# Patient Record
Sex: FEMALE | Race: WHITE | NOT HISPANIC OR LATINO | Employment: OTHER | URBAN - METROPOLITAN AREA
[De-identification: names, ages, dates, MRNs, and addresses within clinical notes are randomized per-mention and may not be internally consistent; named-entity substitution may affect disease eponyms.]

---

## 2023-06-12 ENCOUNTER — HOSPITAL ENCOUNTER (OUTPATIENT)
Dept: DATA CONVERSION | Facility: HOSPITAL | Age: 23
End: 2023-06-12
Attending: PHYSICAL MEDICINE & REHABILITATION | Admitting: PHYSICAL MEDICINE & REHABILITATION
Payer: MEDICARE

## 2023-06-12 DIAGNOSIS — M47.26 OTHER SPONDYLOSIS WITH RADICULOPATHY, LUMBAR REGION: ICD-10-CM

## 2023-06-12 DIAGNOSIS — M43.17 SPONDYLOLISTHESIS, LUMBOSACRAL REGION: ICD-10-CM

## 2023-06-12 DIAGNOSIS — M47.816 SPONDYLOSIS WITHOUT MYELOPATHY OR RADICULOPATHY, LUMBAR REGION: ICD-10-CM

## 2023-06-12 DIAGNOSIS — M54.16 RADICULOPATHY, LUMBAR REGION: ICD-10-CM

## 2023-07-10 ENCOUNTER — HOSPITAL ENCOUNTER (OUTPATIENT)
Dept: DATA CONVERSION | Facility: HOSPITAL | Age: 23
End: 2023-07-10
Attending: PHYSICAL MEDICINE & REHABILITATION | Admitting: PHYSICAL MEDICINE & REHABILITATION
Payer: MEDICARE

## 2023-07-10 DIAGNOSIS — M47.816 SPONDYLOSIS WITHOUT MYELOPATHY OR RADICULOPATHY, LUMBAR REGION: ICD-10-CM

## 2023-07-10 DIAGNOSIS — M54.14 RADICULOPATHY, THORACIC REGION: ICD-10-CM

## 2023-07-10 DIAGNOSIS — M54.50 LOW BACK PAIN, UNSPECIFIED: ICD-10-CM

## 2023-07-10 DIAGNOSIS — M51.16 INTERVERTEBRAL DISC DISORDERS WITH RADICULOPATHY, LUMBAR REGION: ICD-10-CM

## 2023-09-30 NOTE — H&P
History & Physical Reviewed:   Pregnant/Lactating:  ·  Are You Pregnant no   ·  Are You Currently Breastfeeding no     I have reviewed the History and Physical dated:  19-May-2023   History and Physical reviewed and relevant findings noted. Patient examined to review pertinent physical  findings.: Significant findings noted below   Findings: Patient reports no change in symptoms since   H&P/previous evaluation.  No f/c/s, no change in medical problems or comorbidities.  Heart and lung evaluation is normal  and neurological examination is unchanged from previous as follows:.    EXCEPT hospitalized at [facility] with inc LBP - MRI showed facet arthropathy and severe b/l L5 stenosis.  Hospital team rec'd facet procedures so that's what was scheduled today    Diagnoses/Problems  Assessed    · Thoracic radiculitis (724.4) (M54.14)   · Old tear of medial meniscus of right knee, unspecified tear type (717.3) (M23.203)   · Acquired spondylolisthesis of lumbosacral region (738.4) (M43.17)   · Lumbar radiculitis (724.4) (M54.16)   · Scheuermann's kyphosis of thoracic spine (732.0) (M42.04)   · Lumbar disc herniation with radiculopathy (722.10,724.4) (M51.16)   · Lumbar spondylosis (721.3) (M47.816)    Orders  Acquired spondylolisthesis of lumbosacral region, Lumbar radiculitis, Old tear of medial  meniscus of right knee, unspecified tear type, Thoracic radiculitis    · Start: Diclofenac Sodium 1.5 % External Solution; APPLY 20 DROPS TO AFFECTED  JOINT(S) FOUR TIMES A DAY. MAY USE ON UP TO 2 JOINTS PER DAY  AS DIRECTED  Lateral pain of right hip    · Xray Hip, unilateral w/ pelvis when performed, 2 or 3  view; Status:Active; Requested  for:19May2023;   Laterality : Right  Radiologist to Determine Optimal Study : Y  What are the patient's signs and symptoms? : midthoracic pain, increased kyphosis?   h/o Fredrich's Ataxia wheelchair dependent  Scheuermann's kyphosis of thoracic spine, Thoracic radiculitis    · Xray Thoracic Spine  Min 4 View; Status:Active; Requested for:[id number];   Radiologist to Determine Optimal Study : Y  What are the patient's signs and symptoms? : midthoracic pain, increased kyphosis?   h/o Fredrich's Ataxia wheelchair dependent    Chief Complaint    FUV- Evaluate & treat upper & lower back pain-      History of Present Illness      Follow up note     CC: Patient complains of low back pain    Here with her mother. Had BL L5 TFESI on 8/24/2022 -: had 60% relief for 4-5 months but repeat bilateral L5 transforaminal March 13 seem to help last, especially the right side where she has pain radiating to the buttock. Also seen separately for new  problem more of a bump in her thoracic spine, gets sensitive superficially. Progressive thoracic pain, started around the same time in March, she did have a fall during transfer about 2 weeks before the epidural injection, with right knee meniscus tear,  but also thinks she may have injured the thoracic spine at the time, no immediate pain then. Now feels bilateral achy pain occasionally sharp between the shoulder blades with a knot on the left side. Has been trying physical therapy, including some myofascial  release in this region that is only minimally helpful. She has radiation from the thoracic went up to the base of the neck, but not into the extremities, no change in bowel or bladder function  Also separately reports third problem right groin pain, worse with activity transfers better with nothing.  Overall Rates pain 9/10. Still taking percocet 7.5mg QID as per PCP? pain management. Started lamotrigine for neuropathic pain but now is back to just taking gabapentin and baclofen, topical diclofenac. Has tried nitroglycerin for pain.     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 She also has history of obesity's status post bariatric surgery in 2019 with 180 pounds of weight  loss. She  complains of many years of low back pain attributed to her weight and wheelchair use, has a history of Shermans kyphosis and intermittent thoracic pain    Patient reports no fevers, chills, sweats, night pain, cancer history no bowel or bladder disturbance .  She does endorse mood problems, sleep disturbance muscle cramping and stomach problems.      Pertinent Physical Exam (see remainder below):  MSK: Sitting posture is slouching, her same wheelchair is too large for her and has a very flat back, and leg bolsters are too far from her lateral thighs. She also has mild kyphosis, with a prominent thoracic spinous process around to the 10, focally  tender and mild skin induration/discoloration in this region. Some difficulty with trunk control, and stands with assist from her mom that increases her pain. POS tenderness bilateral paraspinals and midline both in the lower lumbar spine, and the lower  cervical/upper thoracic, but minimal midline tenderness. Range of motion is not fully testable because of her weakness, but cervical moves well,   Neuro: Trace weakness in bilateral finger extensors, 4/5 and hand intrinsics, otherwise normal strength in upper extremities, Quads 4/5 on Right and 3/5 on left clearly weaker on Left , ADF 2/5, PF is still 4+/5 with manual testing except toe flexors  4/5. Straight leg raise negative sitting     IMPRESSION:  Friedreich's ataxia with progressive weakness, truncal instability and wheelchair dependence  Scheuermann's kyphosis   Lumbar spondylosis with documented grade 1 L5 anterolisthesis, and lumbar disc protrusion at L4 and L5  Axial pain at cervicothoracic junction is probably mechanical  Prior injections with Dr. Johnson were not in HIE but diagnosed under spondylosis so probably MBB  Annular tear noted at L4-L5 disc on Lumbar MRI from June 8, 2021    RECOMMENDATIONS:  -Reviewed thoracic and right hip x-rays today shows wedging of T10 and T11 vertebrae, with prominent  spinous process/gap between adjacent, hip x-rays look normal but do show lumbar facet arthropathy L5-S1  -Return for wheelchair seating clinic at [facility], suggested building and some relief for her thoracic spinous process, meanwhile use off-the-shelf cut padding that hopefully can be taped and placed in a donut configuration  -We will get MRI thoracic, I will look at lumbar structures as able   -to schedule repeat BL L5 TFESI with 4mg IV versed and depo 40 mg each side   -Continue taking oxycodone 7.5mg QID. Continue diclofenac gel prn. But I sent a new prescription for diclofenac drops can be applied to any region, may be helpful for the mid thoracic, continue lidocaine patch  - follow up next available after MRI - then consider Thoracic SYLVIA or ?Lumber MBB instead of SYLVIA?     Ready to learn, no apparent learning barriers. Education provided on treatment plan according to patient's preferred learning style. Patient verbalizes understanding.  Manas Russell M.D, FAAPMR, R-MSK  Chief, Division of PM&R  Board Certified in PM&R, Sports Medicine and Musculoskeletal Ultrasound    ____________________________________________________________________    SUPPORTING DOCUMENTATION (remaining history, exam, other findings):    Work-up reviewed - this has included MRi Lsp at [facility] 2020- and MRI Lsp at   June 8th 2021    L3 -- 4: Diffuse disc bulge. Patent central canal. Patent bilateral       neural foramina.      L4 -- 5: Diffuse disc bulge. Facet osteoarthritis. Patent central       canal. Patent bilateral neural foramina. posterior annular tear     L5 -- S1: Diffuse disc bulge, significant facet osteoarthritis, grade 1       anterior listhesis of L5 over S1 vertebral body. Patent central canal.       Severe bilateral foramina narrowing.           MRI Tspine - chronic wedging T10/11 and multilevel DDD c/w scheuermans    Treatment has included spine injections Dr Martinez and me  10/25/21 helped GREATLY -  almost no pain x 1 mo and lasted about 1.5 months then gradually returned.  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) (G11.11)   · Lower back pain (724.2) (M54.50)   · Lumbar radiculitis (724.4) (M54.16)   · Lumbar spondylosis (721.3) (M47.816)    Allergies  Medication    · No Known Drug Allergies  Recorded By: Manas Russell; 5/19/2023 8:30:48 AM    Current Meds    Medication Name Instruction  Baclofen 20 MG Oral Tablet   cloNIDine HCl - 0.1 MG Oral Tablet   Diclofenac Sodium 1 % External Gel APPLY 4 GRAMS TO THE AFFECTED AREA(S) FOUR TIMES DAILY AS NEEDED  Famotidine 20 MG Oral Tablet   Gabapentin 100 MG Oral Capsule   hydrOXYzine HCl - 10 MG Oral Tablet   Hyoscyamine Sulfate 0.125 MG Sublingual Tablet Sublingual DISSOLVE 1 (ONE) TABLET UNDER THE TONGUE EVERY 8 HOURS AS NEEDED FOR 7 DAYS  Omeprazole 40 MG Oral Capsule Delayed Release TAKE 1 CAPSULE BY MOUTH TWICE DAILY at 6AM and 9PM  Ondansetron 4 MG Oral Tablet Disintegrating Take 1 tablet by mouth every 6 hours as needed for nausea/vomiting for up to 7 days.  oxyCODONE-Acetaminophen 7.5-325 MG Oral Tablet   Pantoprazole Sodium 40 MG Oral Tablet Delayed Release TAKE 1 TABLET BY MOUTH ONCE DAILY  Sucralfate 1 GM/10ML Oral Suspension   Triamcinolone Acetonide 0.1 % External Cream     Signatures  Electronically signed by : Manas Russell MD; May 19 2023  9:28AM EST (Author)   Home Medications Reviewed: no changes noted   Allergies Reviewed: no changes noted       ERAS (Enhanced Recovery After Surgery):  ·  ERAS Patient: no     Consent:   COVID-19 Consent:  ·  COVID-19 Risk Consent Surgeon has reviewed key risks related to the risk of adwoa COVID-19 and if they contract COVID-19 what the risks are.       Electronic Signatures:  Manas Russell)  (Signed  12-Jun-2023 07:34)   Authored: History & Physical Reviewed, ERAS, Consent,  Note Completion      Last Updated: 12-Jun-2023 07:34 by Manas Russell)

## 2023-09-30 NOTE — H&P
History & Physical Reviewed:   Pregnant/Lactating:  ·  Are You Pregnant no   ·  Are You Currently Breastfeeding no     I have reviewed the History and Physical dated:  30-Jun-2023   History and Physical reviewed and relevant findings noted. Patient examined to review pertinent physical  findings.: Significant findings noted below   Findings: Patient reports no change in symptoms since   H&P/previous evaluation.  No f/c/s, no change in medical problems or comorbidities.  Heart and lung evaluation is normal  and neurological examination is unchanged from previous as follows:.    Diagnoses/Problems  Assessed    · Lumbar disc herniation with radiculopathy (722.10,724.4) (M51.16)   · Lumbar spondylosis (721.3) (M47.816)   · Thoracic radiculitis (724.4) (M54.14)    Chief Complaint    FUV- Evaluate & treat upper & lower back pain-      History of Present Illness        Follow up note     CC: Patient complains of low back pain    Here with her mother. new injuries in falls hurt whole spine, [facility] rec'd MBBs instead of ESIs I had been doing. I did MBB for L5-S1 facets 6/12/23 with good partial relief 8/10 down to 2/10 that day but had a lot of versed then... Now pain back in low  back but radiates to mid thigh posteriorly. SOmetimes all the way to her feet.     Still has mid back pain also with radiation from the thoracic went up to the base of the neck, but not into the extremities, no change in bowel or bladder function  Also separately reports third problem right groin pain, worse with activity transfers better with nothing.    Overall Rates pain 9/10. Still taking percocet 7.5mg QID , gabapentin lamotrigine, baclofen, as per PCP/pain management. topical diclofenac. Has tried nitroglycerin for pain.     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 She also has history of obesity's status post bariatric surgery in 2019  with 180 pounds of weight loss. She  complains of many years of low back pain attributed to her weight and wheelchair use, has a history of Shermans kyphosis and intermittent thoracic pain    Patient reports no fevers, chills, sweats, night pain, cancer history no bowel or bladder disturbance .  She does endorse mood problems, sleep disturbance muscle cramping and stomach problems.      Pertinent Physical Exam (see remainder below):  MSK: Sitting posture is improved with new cushion. mild kyphosis, with a prominent thoracic spinous process around to the 10, focally tender and mild skin induration/discoloration in this region. Some difficulty with trunk control, and Range of motion  is not fully testable because of her weakness, but cervical moves well,   Neuro: Trace weakness in bilateral finger extensors, 4/5 and hand intrinsics, otherwise normal strength in upper extremities, Quads 4/5 on Right and 3/5 on left clearly weaker on Left , ADF 2/5, PF is still 4+/5 with manual testing except toe flexors  4/5. Straight leg raise negative sitting     IMPRESSION:  Friedreich's ataxia with progressive weakness, truncal instability and wheelchair dependence  Scheuermann's kyphosis   Lumbar spondylosis with documented grade 1 L5 anterolisthesis, and lumbar disc protrusion at L4 and L5  Axial pain at cervicothoracic junction is probably mechanical  Prior injections with Dr. Johnson were not in HIE but diagnosed under spondylosis so probably MBB  Annular tear noted at L4-L5 disc on Lumbar MRI from June 8, 2021    RECOMMENDATIONS:  - schedule repeat BL L5 TFESI with 4mg IV versed - if not lasting then we could try MBB #2, (but now she has quite a bit of radicular pain, and epidurals have helped in past.   -Continue taking oxycodone 7.5mg QID. Continue diclofenac drops, baclofen and gabapentin   - follow up 2mo at [facility] facility    Ready to learn, no apparent learning barriers. Education provided on treatment plan according  to patient's preferred learning style. Patient verbalizes understanding.  Manas Russell M.D, [facility], R-MSK  Chief, Division of PM&R  Board Certified in PM&R, Sports Medicine and Musculoskeletal Ultrasound    ____________________________________________________________________    SUPPORTING DOCUMENTATION (remaining history, exam, other findings):    Work-up reviewed - this has included MRi Lsp at [facility] 2020- and MRI Lsp at   June 8th 2021    L3 -- 4: Diffuse disc bulge. Patent central canal. Patent bilateral       neural foramina.      L4 -- 5: Diffuse disc bulge. Facet osteoarthritis. Patent central       canal. Patent bilateral neural foramina. posterior annular tear     L5 -- S1: Diffuse disc bulge, significant facet osteoarthritis, grade 1       anterior listhesis of L5 over S1 vertebral body. Patent central canal.       Severe bilateral foramina narrowing.           MRI Tspine - chronic wedging T10/11 and multilevel DDD c/w scheuermans    Treatment has included spine injections Dr Martinez and me  10/25/21 helped GREATLY - almost no pain x 1 mo and lasted about 1.5 months then gradually returned.  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)   · Lateral pain of right hip (719.45) (M25.551)   · Lower back pain (724.2) (M54.50)   · Lumbar disc herniation with radiculopathy (722.10,724.4) (M51.16)   · Lumbar radiculitis (724.4) (M54.16)   · Lumbar spondylosis (721.3) (M47.816)   · Old tear of medial meniscus of right knee, unspecified tear type (717.3) (M23.203)   · Scheuermann's kyphosis of thoracic spine (732.0) (M42.04)   · Thoracic  radiculitis (724.4) (M54.14)    Allergies  Medication    · No Known Drug Allergies  Recorded By: Manas Russell; 5/19/2023 8:30:48 AM    Current Meds    Medication Name Instruction  Baclofen 20 MG Oral Tablet   cloNIDine HCl - 0.1 MG Oral Tablet   Diclofenac Sodium 1 % External Gel APPLY 4 GRAMS TO THE AFFECTED AREA(S) FOUR TIMES DAILY AS NEEDED  Diclofenac Sodium 1.5 % External Solution APPLY 20 DROPS TO AFFECTED JOINT(S) FOUR TIMES A DAY. MAY USE ON UP TO 2 JOINTS PER DAY AS DIRECTED.  Famotidine 20 MG Oral Tablet   Gabapentin 100 MG Oral Capsule   hydrOXYzine HCl - 10 MG Oral Tablet   Hyoscyamine Sulfate 0.125 MG Sublingual Tablet Sublingual DISSOLVE 1 (ONE) TABLET UNDER THE TONGUE EVERY 8 HOURS AS NEEDED FOR 7 DAYS  Omeprazole 40 MG Oral Capsule Delayed Release TAKE 1 CAPSULE BY MOUTH TWICE DAILY at 6AM and 9PM  Ondansetron 4 MG Oral Tablet Disintegrating Take 1 tablet by mouth every 6 hours as needed for nausea/vomiting for up to 7 days.  oxyCODONE-Acetaminophen 7.5-325 MG Oral Tablet   Pantoprazole Sodium 40 MG Oral Tablet Delayed Release TAKE 1 TABLET BY MOUTH ONCE DAILY  Sucralfate 1 GM/10ML Oral Suspension   Triamcinolone Acetonide 0.1 % External Cream     Signatures  Electronically signed by : Manas Russell MD; Jun 30 2023 10:00AM EST (Author)   Home Medications Reviewed: no changes noted   Allergies Reviewed: no changes noted       ERAS (Enhanced Recovery After Surgery):  ·  ERAS Patient: no     Consent:   COVID-19 Consent:  ·  COVID-19 Risk Consent Surgeon has reviewed key risks related to the risk of adwoa COVID-19 and if they contract COVID-19 what the risks are.       Electronic Signatures:  Manas Russell)  (Signed 10-Jul-2023 08:35)   Authored: History & Physical Reviewed, ERAS, Consent,  Note Completion      Last Updated: 10-Jul-2023 08:35 by Manas Russell)
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
## 2023-10-02 NOTE — OP NOTE
PROCEDURE DETAILS    Preoperative Diagnosis:  Intervertebral disc disorder with radiculopathy of lumbar region, M51.16  Spondyloarthropathy of lumbar spine, M47.816    Postoperative Diagnosis:  Intervertebral disc disorder with radiculopathy of lumbar region, M51.16  Spondyloarthropathy of lumbar spine, M47.816    Surgeon: Manas Russell  Resident/Fellow/Other Assistant: Dr Kiarra Walker and Jaime Guerrero were observing only    Procedure:  1.  BILATERAL L5-S1 TRANSFORAMINAL EPIDURAL STEROID INJECTION WITH FLUORO AND IV SEDATION    Anesthesia: No anesthesiologist associated with this case  Estimated Blood Loss: 0  Findings: below        Operative Report:   Sedation: Versed 4mg IV was good    The patient was then taken back to the procedure room and was placed in a prone position and routine noninvasive monitors were applied.  A timeout was performed verifying patient identification, site and allergies.   The back was prepped and draped in  a sterile fashion. Under direct fluoroscopic visualization, the Right L5 transverse process was identified. The skin and subcutaneous tissues were anesthetized with 2cc of lidocaine and 0.5 cc sodium bicarbonate . Under direct fluoroscopic guidance,   a 5  inch, 22 gauge spinal needle was directed obliquely to contact the inferior aspect of the transverse process and then directed to enter the neural foramen.  Needle placement was confirmed on both AP and lateral views prior to the contrast injection.  Omnipaque 2 cc was injected,  confirming epiradicular flow pattern and highlighting the L5 spinal nerve. The injection was completed with 0.75  cc of DEXAMETHASONE 10mg/cc and 2.5 cc of lidocaine, preservative free.    Attention was then turned to the Left L5 transverse process,  and the subcutaneous tissue anesthetized in the same fashion as above. Under direct fluoroscopic guidance,  a 5 inch, 22 gauge spinal needle was directed obliquely to contact the inferior aspect  of  the transverse process and then directed to enter the neural foramen.  Needle placement was confirmed on both AP and lateral views prior to the contrast injection. Omnipaque 2 cc was injected,  confirming epiradicular flow pattern and highlighting  the L5 spinal nerve. The injection was completed with 0.75  cc of DEXAMETHASONE 10mg/cc and 2.5 cc of 2% lidocaine, preservative free.    The patient tolerated the procedure well and without complications and was taken back to the recovery area in good condition.  Post procedure care,  precautions and instructions given and patient verbalized understanding..                        Attestation:   Note Completion:  Attending Attestation I performed the procedure without a resident         Electronic Signatures:  Manas Russell)  (Signed 10-Jul-2023 09:44)   Authored: Post-Operative Note, Chart Review, Note Completion      Last Updated: 10-Jul-2023 09:44 by Manas Russell)

## 2023-10-03 PROCEDURE — 99285 EMERGENCY DEPT VISIT HI MDM: CPT | Performed by: STUDENT IN AN ORGANIZED HEALTH CARE EDUCATION/TRAINING PROGRAM

## 2023-10-03 PROCEDURE — 99284 EMERGENCY DEPT VISIT MOD MDM: CPT | Performed by: STUDENT IN AN ORGANIZED HEALTH CARE EDUCATION/TRAINING PROGRAM

## 2023-10-04 ENCOUNTER — HOSPITAL ENCOUNTER (EMERGENCY)
Facility: HOSPITAL | Age: 23
Discharge: HOME | End: 2023-10-04
Attending: STUDENT IN AN ORGANIZED HEALTH CARE EDUCATION/TRAINING PROGRAM
Payer: MEDICARE

## 2023-10-04 ENCOUNTER — APPOINTMENT (OUTPATIENT)
Dept: RADIOLOGY | Facility: HOSPITAL | Age: 23
End: 2023-10-04
Payer: MEDICARE

## 2023-10-04 VITALS
RESPIRATION RATE: 163 BRPM | HEART RATE: 77 BPM | DIASTOLIC BLOOD PRESSURE: 63 MMHG | TEMPERATURE: 97.7 F | SYSTOLIC BLOOD PRESSURE: 135 MMHG | OXYGEN SATURATION: 96 %

## 2023-10-04 DIAGNOSIS — N30.00 ACUTE CYSTITIS WITHOUT HEMATURIA: Primary | ICD-10-CM

## 2023-10-04 LAB
ALBUMIN SERPL BCP-MCNC: 3.8 G/DL (ref 3.4–5)
ALP SERPL-CCNC: 40 U/L (ref 33–110)
ALT SERPL W P-5'-P-CCNC: 7 U/L (ref 7–45)
ANION GAP SERPL CALC-SCNC: 9 MMOL/L (ref 10–20)
APPEARANCE UR: ABNORMAL
AST SERPL W P-5'-P-CCNC: 13 U/L (ref 9–39)
B-HCG SERPL-ACNC: <2 MIU/ML
BASOPHILS # BLD AUTO: 0.03 X10*3/UL (ref 0–0.1)
BASOPHILS NFR BLD AUTO: 0.5 %
BILIRUB SERPL-MCNC: 0.4 MG/DL (ref 0–1.2)
BILIRUB UR STRIP.AUTO-MCNC: NEGATIVE MG/DL
BUN SERPL-MCNC: 11 MG/DL (ref 6–23)
CALCIUM SERPL-MCNC: 9 MG/DL (ref 8.6–10.3)
CHLORIDE SERPL-SCNC: 103 MMOL/L (ref 98–107)
CO2 SERPL-SCNC: 29 MMOL/L (ref 21–32)
COLOR UR: YELLOW
CREAT SERPL-MCNC: 0.48 MG/DL (ref 0.5–1.05)
EOSINOPHIL # BLD AUTO: 0.14 X10*3/UL (ref 0–0.7)
EOSINOPHIL NFR BLD AUTO: 2.2 %
ERYTHROCYTE [DISTWIDTH] IN BLOOD BY AUTOMATED COUNT: 13.2 % (ref 11.5–14.5)
GFR SERPL CREATININE-BSD FRML MDRD: >90 ML/MIN/1.73M*2
GLUCOSE SERPL-MCNC: 89 MG/DL (ref 74–99)
GLUCOSE UR STRIP.AUTO-MCNC: NEGATIVE MG/DL
HCT VFR BLD AUTO: 38.3 % (ref 36–46)
HGB BLD-MCNC: 12.2 G/DL (ref 12–16)
IMM GRANULOCYTES # BLD AUTO: 0.02 X10*3/UL (ref 0–0.7)
IMM GRANULOCYTES NFR BLD AUTO: 0.3 % (ref 0–0.9)
INR PPP: 1 (ref 0.9–1.1)
KETONES UR STRIP.AUTO-MCNC: NEGATIVE MG/DL
LACTATE SERPL-SCNC: 1 MMOL/L (ref 0.4–2)
LEUKOCYTE ESTERASE UR QL STRIP.AUTO: ABNORMAL
LYMPHOCYTES # BLD AUTO: 2.52 X10*3/UL (ref 1.2–4.8)
LYMPHOCYTES NFR BLD AUTO: 40.1 %
MAGNESIUM SERPL-MCNC: 1.9 MG/DL (ref 1.6–2.4)
MCH RBC QN AUTO: 28.8 PG (ref 26–34)
MCHC RBC AUTO-ENTMCNC: 31.9 G/DL (ref 32–36)
MCV RBC AUTO: 91 FL (ref 80–100)
MONOCYTES # BLD AUTO: 0.47 X10*3/UL (ref 0.1–1)
MONOCYTES NFR BLD AUTO: 7.5 %
NEUTROPHILS # BLD AUTO: 3.1 X10*3/UL (ref 1.2–7.7)
NEUTROPHILS NFR BLD AUTO: 49.4 %
NITRITE UR QL STRIP.AUTO: NEGATIVE
NRBC BLD-RTO: 0 /100 WBCS (ref 0–0)
PH UR STRIP.AUTO: 7 [PH]
PLATELET # BLD AUTO: 242 X10*3/UL (ref 150–450)
PMV BLD AUTO: 10.7 FL (ref 7.5–11.5)
POTASSIUM SERPL-SCNC: 4.2 MMOL/L (ref 3.5–5.3)
PROT SERPL-MCNC: 6.4 G/DL (ref 6.4–8.2)
PROT UR STRIP.AUTO-MCNC: NEGATIVE MG/DL
PROTHROMBIN TIME: 11.4 SECONDS (ref 9.8–12.8)
RBC # BLD AUTO: 4.23 X10*6/UL (ref 4–5.2)
RBC # UR STRIP.AUTO: NEGATIVE /UL
RBC #/AREA URNS AUTO: ABNORMAL /HPF
SODIUM SERPL-SCNC: 137 MMOL/L (ref 136–145)
SP GR UR STRIP.AUTO: 1.03
UROBILINOGEN UR STRIP.AUTO-MCNC: <2 MG/DL
WBC # BLD AUTO: 6.3 X10*3/UL (ref 4.4–11.3)
WBC #/AREA URNS AUTO: ABNORMAL /HPF

## 2023-10-04 PROCEDURE — G1004 CDSM NDSC: HCPCS

## 2023-10-04 PROCEDURE — 83605 ASSAY OF LACTIC ACID: CPT | Performed by: STUDENT IN AN ORGANIZED HEALTH CARE EDUCATION/TRAINING PROGRAM

## 2023-10-04 PROCEDURE — 81001 URINALYSIS AUTO W/SCOPE: CPT | Performed by: STUDENT IN AN ORGANIZED HEALTH CARE EDUCATION/TRAINING PROGRAM

## 2023-10-04 PROCEDURE — 2500000004 HC RX 250 GENERAL PHARMACY W/ HCPCS (ALT 636 FOR OP/ED): Performed by: STUDENT IN AN ORGANIZED HEALTH CARE EDUCATION/TRAINING PROGRAM

## 2023-10-04 PROCEDURE — 2550000001 HC RX 255 CONTRASTS: Performed by: STUDENT IN AN ORGANIZED HEALTH CARE EDUCATION/TRAINING PROGRAM

## 2023-10-04 PROCEDURE — 83735 ASSAY OF MAGNESIUM: CPT | Performed by: STUDENT IN AN ORGANIZED HEALTH CARE EDUCATION/TRAINING PROGRAM

## 2023-10-04 PROCEDURE — 80053 COMPREHEN METABOLIC PANEL: CPT | Performed by: STUDENT IN AN ORGANIZED HEALTH CARE EDUCATION/TRAINING PROGRAM

## 2023-10-04 PROCEDURE — 2580000001 HC RX 258 IV SOLUTIONS: Performed by: STUDENT IN AN ORGANIZED HEALTH CARE EDUCATION/TRAINING PROGRAM

## 2023-10-04 PROCEDURE — 84702 CHORIONIC GONADOTROPIN TEST: CPT | Performed by: STUDENT IN AN ORGANIZED HEALTH CARE EDUCATION/TRAINING PROGRAM

## 2023-10-04 PROCEDURE — 36415 COLL VENOUS BLD VENIPUNCTURE: CPT | Performed by: STUDENT IN AN ORGANIZED HEALTH CARE EDUCATION/TRAINING PROGRAM

## 2023-10-04 PROCEDURE — 85610 PROTHROMBIN TIME: CPT | Performed by: STUDENT IN AN ORGANIZED HEALTH CARE EDUCATION/TRAINING PROGRAM

## 2023-10-04 PROCEDURE — 74177 CT ABD & PELVIS W/CONTRAST: CPT | Performed by: SURGERY

## 2023-10-04 PROCEDURE — 85025 COMPLETE CBC W/AUTO DIFF WBC: CPT | Performed by: STUDENT IN AN ORGANIZED HEALTH CARE EDUCATION/TRAINING PROGRAM

## 2023-10-04 RX ORDER — ONDANSETRON HYDROCHLORIDE 2 MG/ML
4 INJECTION, SOLUTION INTRAVENOUS ONCE
Status: COMPLETED | OUTPATIENT
Start: 2023-10-04 | End: 2023-10-04

## 2023-10-04 RX ORDER — KETOROLAC TROMETHAMINE 15 MG/ML
15 INJECTION, SOLUTION INTRAMUSCULAR; INTRAVENOUS ONCE
Status: COMPLETED | OUTPATIENT
Start: 2023-10-04 | End: 2023-10-04

## 2023-10-04 RX ORDER — CEFTRIAXONE 1 G/50ML
1 INJECTION, SOLUTION INTRAVENOUS ONCE
Status: COMPLETED | OUTPATIENT
Start: 2023-10-04 | End: 2023-10-04

## 2023-10-04 RX ORDER — SULFAMETHOXAZOLE AND TRIMETHOPRIM 800; 160 MG/1; MG/1
1 TABLET ORAL 2 TIMES DAILY
Qty: 20 TABLET | Refills: 0 | Status: SHIPPED | OUTPATIENT
Start: 2023-10-04 | End: 2023-10-14

## 2023-10-04 RX ADMIN — SODIUM CHLORIDE, POTASSIUM CHLORIDE, SODIUM LACTATE AND CALCIUM CHLORIDE 1000 ML: 600; 310; 30; 20 INJECTION, SOLUTION INTRAVENOUS at 04:08

## 2023-10-04 RX ADMIN — IOHEXOL 75 ML: 350 INJECTION, SOLUTION INTRAVENOUS at 05:06

## 2023-10-04 RX ADMIN — CEFTRIAXONE SODIUM 1 G: 1 INJECTION, SOLUTION INTRAVENOUS at 06:59

## 2023-10-04 RX ADMIN — ONDANSETRON 4 MG: 2 INJECTION INTRAMUSCULAR; INTRAVENOUS at 04:40

## 2023-10-04 RX ADMIN — KETOROLAC TROMETHAMINE 15 MG: 15 INJECTION, SOLUTION INTRAMUSCULAR; INTRAVENOUS at 04:40

## 2023-10-04 ASSESSMENT — PAIN DESCRIPTION - PROGRESSION: CLINICAL_PROGRESSION: NOT CHANGED

## 2023-10-04 ASSESSMENT — COLUMBIA-SUICIDE SEVERITY RATING SCALE - C-SSRS
2. HAVE YOU ACTUALLY HAD ANY THOUGHTS OF KILLING YOURSELF?: NO
6. HAVE YOU EVER DONE ANYTHING, STARTED TO DO ANYTHING, OR PREPARED TO DO ANYTHING TO END YOUR LIFE?: NO
1. IN THE PAST MONTH, HAVE YOU WISHED YOU WERE DEAD OR WISHED YOU COULD GO TO SLEEP AND NOT WAKE UP?: NO

## 2023-10-04 ASSESSMENT — LIFESTYLE VARIABLES
HAVE YOU EVER FELT YOU SHOULD CUT DOWN ON YOUR DRINKING: NO
EVER HAD A DRINK FIRST THING IN THE MORNING TO STEADY YOUR NERVES TO GET RID OF A HANGOVER: NO
HAVE PEOPLE ANNOYED YOU BY CRITICIZING YOUR DRINKING: NO
EVER FELT BAD OR GUILTY ABOUT YOUR DRINKING: NO

## 2023-10-04 ASSESSMENT — PAIN SCALES - GENERAL
PAINLEVEL_OUTOF10: 7
PAINLEVEL_OUTOF10: 7

## 2023-10-04 ASSESSMENT — PAIN - FUNCTIONAL ASSESSMENT: PAIN_FUNCTIONAL_ASSESSMENT: 0-10

## 2023-10-04 ASSESSMENT — PAIN DESCRIPTION - FREQUENCY: FREQUENCY: INTERMITTENT

## 2023-10-04 ASSESSMENT — PAIN DESCRIPTION - ONSET: ONSET: GRADUAL

## 2023-10-04 ASSESSMENT — PAIN DESCRIPTION - LOCATION: LOCATION: BACK

## 2023-10-04 ASSESSMENT — PAIN DESCRIPTION - DESCRIPTORS: DESCRIPTORS: ACHING

## 2023-10-04 ASSESSMENT — PAIN DESCRIPTION - PAIN TYPE: TYPE: ACUTE PAIN

## 2023-10-04 NOTE — ED PROVIDER NOTES
HPI   Chief Complaint   Patient presents with    Back Pain     Right lower back    Flank Pain       This is a 23-year-old female with past medical history of Friedreich's ataxia who presents to the ED for 5 days of right-sided flank pain.  Reports that there is aching that is intermittent and sharp, also suprapubic.  Had a urinalysis done earlier today.  Unknown results, is due to have ultrasound done.  She has had previous episodes of urolithiasis.  States it feels similar in the past, the urolithiasis improved symptomatically with Toradol.  She has also had some nausea/vomiting.                        No data recorded                Patient History   History reviewed. No pertinent past medical history.  History reviewed. No pertinent surgical history.  No family history on file.  Social History     Tobacco Use    Smoking status: Never    Smokeless tobacco: Never   Substance Use Topics    Alcohol use: Never    Drug use: Never       Physical Exam   ED Triage Vitals [10/04/23 0003]   Temp Heart Rate Resp BP   36.5 °C (97.7 °F) 106 19 145/67      SpO2 Temp Source Heart Rate Source Patient Position   98 % Temporal -- Sitting      BP Location FiO2 (%)     Right arm --       Physical Exam  Constitutional:       Appearance: Normal appearance.   HENT:      Head: Normocephalic and atraumatic.      Mouth/Throat:      Mouth: Mucous membranes are moist.   Eyes:      Extraocular Movements: Extraocular movements intact.   Cardiovascular:      Rate and Rhythm: Normal rate and regular rhythm.      Heart sounds: Normal heart sounds. No murmur heard.  Pulmonary:      Effort: Pulmonary effort is normal. No respiratory distress.      Breath sounds: Normal breath sounds. No wheezing.   Abdominal:      General: There is no distension.      Palpations: Abdomen is soft.      Tenderness: There is no abdominal tenderness. There is no guarding.   Musculoskeletal:      Right lower leg: No edema.      Left lower leg: No edema.   Skin:      General: Skin is warm and dry.   Neurological:      General: No focal deficit present.      Mental Status: She is alert and oriented to person, place, and time.   Psychiatric:         Mood and Affect: Mood normal.         Behavior: Behavior normal.       ED Course & MDM   Diagnoses as of 10/05/23 0709   Acute cystitis without hematuria       Medical Decision Making  Mildly tachycardic on arrival to the ED, will obtain basic blood work, including lactate, administer IV fluids, Toradol and Zofran, urinalysis    Laboratory studies, grossly unremarkable, the urinalysis demonstrates evidence of UTI with white cells and leukocyte esterase.    CT scan of the abdomen pelvis on my interpretation, demonstrates evidence of a large stool burden, some dilated bowel without obstruction.  There is also some bladder wall thickening, consistent with cystitis.  Will order for dose of Rocephin here in the ED, and patient will be discharged home with Bactrim for treatment of cystitis vs pyelonephritis.     Discussed with the attending  Jose A Nguyễn DO PGY-4  Emergency Medicine        Procedure  Procedures     Jitendra Lerma DO  10/05/23 1131

## 2023-11-13 ENCOUNTER — TELEPHONE (OUTPATIENT)
Dept: ORTHOPEDIC SURGERY | Facility: CLINIC | Age: 23
End: 2023-11-13
Payer: MEDICARE

## 2023-11-14 NOTE — TELEPHONE ENCOUNTER
Called mother. Message left for date and time of appointment to see Dr. Thayer as instructed by Dr. Russell

## 2023-11-18 ENCOUNTER — APPOINTMENT (OUTPATIENT)
Dept: RADIOLOGY | Facility: HOSPITAL | Age: 23
DRG: 552 | End: 2023-11-18
Payer: MEDICARE

## 2023-11-18 ENCOUNTER — HOSPITAL ENCOUNTER (INPATIENT)
Facility: HOSPITAL | Age: 23
LOS: 12 days | Discharge: HOME HEALTH | DRG: 552 | End: 2023-12-01
Attending: STUDENT IN AN ORGANIZED HEALTH CARE EDUCATION/TRAINING PROGRAM | Admitting: INTERNAL MEDICINE
Payer: MEDICARE

## 2023-11-18 DIAGNOSIS — R10.13 DYSPEPSIA: ICD-10-CM

## 2023-11-18 DIAGNOSIS — M54.50 LOW BACK PAIN WITHOUT SCIATICA, UNSPECIFIED BACK PAIN LATERALITY, UNSPECIFIED CHRONICITY: Primary | ICD-10-CM

## 2023-11-18 DIAGNOSIS — M47.816 SPONDYLOSIS OF LUMBAR SPINE: ICD-10-CM

## 2023-11-18 PROBLEM — M54.9 BACK PAIN DUE TO INJURY: Status: ACTIVE | Noted: 2023-11-18

## 2023-11-18 LAB
ALBUMIN SERPL BCP-MCNC: 3.9 G/DL (ref 3.4–5)
ALP SERPL-CCNC: 43 U/L (ref 33–110)
ALT SERPL W P-5'-P-CCNC: 22 U/L (ref 7–45)
ANION GAP SERPL CALC-SCNC: 12 MMOL/L (ref 10–20)
APPEARANCE UR: CLEAR
AST SERPL W P-5'-P-CCNC: 39 U/L (ref 9–39)
BASOPHILS # BLD AUTO: 0.04 X10*3/UL (ref 0–0.1)
BASOPHILS NFR BLD AUTO: 0.7 %
BILIRUB SERPL-MCNC: 0.4 MG/DL (ref 0–1.2)
BILIRUB UR STRIP.AUTO-MCNC: NEGATIVE MG/DL
BUN SERPL-MCNC: 6 MG/DL (ref 6–23)
CALCIUM SERPL-MCNC: 9.1 MG/DL (ref 8.6–10.3)
CHLORIDE SERPL-SCNC: 107 MMOL/L (ref 98–107)
CO2 SERPL-SCNC: 26 MMOL/L (ref 21–32)
COLOR UR: YELLOW
CREAT SERPL-MCNC: 0.53 MG/DL (ref 0.5–1.05)
EOSINOPHIL # BLD AUTO: 0.06 X10*3/UL (ref 0–0.7)
EOSINOPHIL NFR BLD AUTO: 1 %
ERYTHROCYTE [DISTWIDTH] IN BLOOD BY AUTOMATED COUNT: 13.4 % (ref 11.5–14.5)
GFR SERPL CREATININE-BSD FRML MDRD: >90 ML/MIN/1.73M*2
GLUCOSE SERPL-MCNC: 101 MG/DL (ref 74–99)
GLUCOSE UR STRIP.AUTO-MCNC: NEGATIVE MG/DL
HCG UR QL IA.RAPID: NEGATIVE
HCT VFR BLD AUTO: 43.8 % (ref 36–46)
HGB BLD-MCNC: 14.1 G/DL (ref 12–16)
HOLD SPECIMEN: NORMAL
IMM GRANULOCYTES # BLD AUTO: 0.01 X10*3/UL (ref 0–0.7)
IMM GRANULOCYTES NFR BLD AUTO: 0.2 % (ref 0–0.9)
KETONES UR STRIP.AUTO-MCNC: ABNORMAL MG/DL
LACTATE SERPL-SCNC: 0.8 MMOL/L (ref 0.4–2)
LEUKOCYTE ESTERASE UR QL STRIP.AUTO: NEGATIVE
LIPASE SERPL-CCNC: 9 U/L (ref 9–82)
LYMPHOCYTES # BLD AUTO: 1.52 X10*3/UL (ref 1.2–4.8)
LYMPHOCYTES NFR BLD AUTO: 25.6 %
MAGNESIUM SERPL-MCNC: 1.88 MG/DL (ref 1.6–2.4)
MCH RBC QN AUTO: 29.7 PG (ref 26–34)
MCHC RBC AUTO-ENTMCNC: 32.2 G/DL (ref 32–36)
MCV RBC AUTO: 92 FL (ref 80–100)
MONOCYTES # BLD AUTO: 0.5 X10*3/UL (ref 0.1–1)
MONOCYTES NFR BLD AUTO: 8.4 %
NEUTROPHILS # BLD AUTO: 3.8 X10*3/UL (ref 1.2–7.7)
NEUTROPHILS NFR BLD AUTO: 64.1 %
NITRITE UR QL STRIP.AUTO: NEGATIVE
NRBC BLD-RTO: 0 /100 WBCS (ref 0–0)
PH UR STRIP.AUTO: 8 [PH]
PLATELET # BLD AUTO: 179 X10*3/UL (ref 150–450)
POTASSIUM SERPL-SCNC: 3.8 MMOL/L (ref 3.5–5.3)
POTASSIUM SERPL-SCNC: 5.4 MMOL/L (ref 3.5–5.3)
PROT SERPL-MCNC: 6.9 G/DL (ref 6.4–8.2)
PROT UR STRIP.AUTO-MCNC: NEGATIVE MG/DL
RBC # BLD AUTO: 4.75 X10*6/UL (ref 4–5.2)
RBC # UR STRIP.AUTO: NEGATIVE /UL
SODIUM SERPL-SCNC: 140 MMOL/L (ref 136–145)
SP GR UR STRIP.AUTO: 1.01
UROBILINOGEN UR STRIP.AUTO-MCNC: <2 MG/DL
WBC # BLD AUTO: 5.9 X10*3/UL (ref 4.4–11.3)

## 2023-11-18 PROCEDURE — 2550000001 HC RX 255 CONTRASTS: Performed by: STUDENT IN AN ORGANIZED HEALTH CARE EDUCATION/TRAINING PROGRAM

## 2023-11-18 PROCEDURE — 99285 EMERGENCY DEPT VISIT HI MDM: CPT | Mod: 25 | Performed by: STUDENT IN AN ORGANIZED HEALTH CARE EDUCATION/TRAINING PROGRAM

## 2023-11-18 PROCEDURE — 2500000004 HC RX 250 GENERAL PHARMACY W/ HCPCS (ALT 636 FOR OP/ED): Performed by: STUDENT IN AN ORGANIZED HEALTH CARE EDUCATION/TRAINING PROGRAM

## 2023-11-18 PROCEDURE — 96375 TX/PRO/DX INJ NEW DRUG ADDON: CPT

## 2023-11-18 PROCEDURE — 81003 URINALYSIS AUTO W/O SCOPE: CPT | Performed by: STUDENT IN AN ORGANIZED HEALTH CARE EDUCATION/TRAINING PROGRAM

## 2023-11-18 PROCEDURE — 74177 CT ABD & PELVIS W/CONTRAST: CPT

## 2023-11-18 PROCEDURE — 2500000005 HC RX 250 GENERAL PHARMACY W/O HCPCS: Performed by: STUDENT IN AN ORGANIZED HEALTH CARE EDUCATION/TRAINING PROGRAM

## 2023-11-18 PROCEDURE — 99222 1ST HOSP IP/OBS MODERATE 55: CPT | Performed by: NURSE PRACTITIONER

## 2023-11-18 PROCEDURE — 96372 THER/PROPH/DIAG INJ SC/IM: CPT | Mod: 25

## 2023-11-18 PROCEDURE — 84132 ASSAY OF SERUM POTASSIUM: CPT | Performed by: STUDENT IN AN ORGANIZED HEALTH CARE EDUCATION/TRAINING PROGRAM

## 2023-11-18 PROCEDURE — 83605 ASSAY OF LACTIC ACID: CPT | Performed by: STUDENT IN AN ORGANIZED HEALTH CARE EDUCATION/TRAINING PROGRAM

## 2023-11-18 PROCEDURE — 80053 COMPREHEN METABOLIC PANEL: CPT | Performed by: STUDENT IN AN ORGANIZED HEALTH CARE EDUCATION/TRAINING PROGRAM

## 2023-11-18 PROCEDURE — 83735 ASSAY OF MAGNESIUM: CPT | Performed by: STUDENT IN AN ORGANIZED HEALTH CARE EDUCATION/TRAINING PROGRAM

## 2023-11-18 PROCEDURE — 71260 CT THORAX DX C+: CPT | Performed by: SURGERY

## 2023-11-18 PROCEDURE — 36415 COLL VENOUS BLD VENIPUNCTURE: CPT | Performed by: STUDENT IN AN ORGANIZED HEALTH CARE EDUCATION/TRAINING PROGRAM

## 2023-11-18 PROCEDURE — 81025 URINE PREGNANCY TEST: CPT | Performed by: STUDENT IN AN ORGANIZED HEALTH CARE EDUCATION/TRAINING PROGRAM

## 2023-11-18 PROCEDURE — 74177 CT ABD & PELVIS W/CONTRAST: CPT | Performed by: SURGERY

## 2023-11-18 PROCEDURE — G0378 HOSPITAL OBSERVATION PER HR: HCPCS

## 2023-11-18 PROCEDURE — 72131 CT LUMBAR SPINE W/O DYE: CPT | Mod: RSC

## 2023-11-18 PROCEDURE — 72128 CT CHEST SPINE W/O DYE: CPT | Mod: RSC

## 2023-11-18 PROCEDURE — 2500000001 HC RX 250 WO HCPCS SELF ADMINISTERED DRUGS (ALT 637 FOR MEDICARE OP): Performed by: STUDENT IN AN ORGANIZED HEALTH CARE EDUCATION/TRAINING PROGRAM

## 2023-11-18 PROCEDURE — 71260 CT THORAX DX C+: CPT

## 2023-11-18 PROCEDURE — 72128 CT CHEST SPINE W/O DYE: CPT | Performed by: SURGERY

## 2023-11-18 PROCEDURE — 96374 THER/PROPH/DIAG INJ IV PUSH: CPT

## 2023-11-18 PROCEDURE — 72131 CT LUMBAR SPINE W/O DYE: CPT | Performed by: SURGERY

## 2023-11-18 PROCEDURE — 83690 ASSAY OF LIPASE: CPT | Performed by: STUDENT IN AN ORGANIZED HEALTH CARE EDUCATION/TRAINING PROGRAM

## 2023-11-18 PROCEDURE — 85025 COMPLETE CBC W/AUTO DIFF WBC: CPT | Performed by: STUDENT IN AN ORGANIZED HEALTH CARE EDUCATION/TRAINING PROGRAM

## 2023-11-18 RX ORDER — METHOCARBAMOL 500 MG/1
750 TABLET, FILM COATED ORAL ONCE
Status: COMPLETED | OUTPATIENT
Start: 2023-11-18 | End: 2023-11-18

## 2023-11-18 RX ORDER — ACETAMINOPHEN 325 MG/1
650 TABLET ORAL ONCE
Status: COMPLETED | OUTPATIENT
Start: 2023-11-18 | End: 2023-11-18

## 2023-11-18 RX ORDER — LIDOCAINE 560 MG/1
1 PATCH PERCUTANEOUS; TOPICAL; TRANSDERMAL ONCE
Status: COMPLETED | OUTPATIENT
Start: 2023-11-18 | End: 2023-11-19

## 2023-11-18 RX ORDER — KETOROLAC TROMETHAMINE 30 MG/ML
15 INJECTION, SOLUTION INTRAMUSCULAR; INTRAVENOUS ONCE
Status: COMPLETED | OUTPATIENT
Start: 2023-11-18 | End: 2023-11-18

## 2023-11-18 RX ORDER — PROCHLORPERAZINE EDISYLATE 5 MG/ML
5 INJECTION INTRAMUSCULAR; INTRAVENOUS ONCE
Status: COMPLETED | OUTPATIENT
Start: 2023-11-18 | End: 2023-11-18

## 2023-11-18 RX ORDER — PREDNISONE 20 MG/1
40 TABLET ORAL ONCE
Status: COMPLETED | OUTPATIENT
Start: 2023-11-18 | End: 2023-11-18

## 2023-11-18 RX ORDER — METHOCARBAMOL 100 MG/ML
1000 INJECTION, SOLUTION INTRAMUSCULAR; INTRAVENOUS ONCE
Status: DISCONTINUED | OUTPATIENT
Start: 2023-11-18 | End: 2023-11-18

## 2023-11-18 RX ORDER — ORPHENADRINE CITRATE 30 MG/ML
60 INJECTION INTRAMUSCULAR; INTRAVENOUS ONCE
Status: COMPLETED | OUTPATIENT
Start: 2023-11-18 | End: 2023-11-18

## 2023-11-18 RX ADMIN — ORPHENADRINE CITRATE 60 MG: 60 INJECTION INTRAMUSCULAR; INTRAVENOUS at 18:51

## 2023-11-18 RX ADMIN — KETOROLAC TROMETHAMINE 15 MG: 30 INJECTION, SOLUTION INTRAMUSCULAR at 18:50

## 2023-11-18 RX ADMIN — ACETAMINOPHEN 650 MG: 325 TABLET ORAL at 18:50

## 2023-11-18 RX ADMIN — PROCHLORPERAZINE EDISYLATE 5 MG: 5 INJECTION INTRAMUSCULAR; INTRAVENOUS at 21:43

## 2023-11-18 RX ADMIN — METHOCARBAMOL TABLETS 750 MG: 500 TABLET, COATED ORAL at 23:01

## 2023-11-18 RX ADMIN — HYDROMORPHONE HYDROCHLORIDE 0.5 MG: 1 INJECTION, SOLUTION INTRAMUSCULAR; INTRAVENOUS; SUBCUTANEOUS at 21:14

## 2023-11-18 RX ADMIN — PREDNISONE 40 MG: 20 TABLET ORAL at 18:50

## 2023-11-18 RX ADMIN — LIDOCAINE 1 PATCH: 4 PATCH TOPICAL at 18:51

## 2023-11-18 RX ADMIN — IOHEXOL 75 ML: 350 INJECTION, SOLUTION INTRAVENOUS at 22:07

## 2023-11-18 RX ADMIN — HYDROMORPHONE HYDROCHLORIDE 0.5 MG: 1 INJECTION, SOLUTION INTRAMUSCULAR; INTRAVENOUS; SUBCUTANEOUS at 18:25

## 2023-11-18 ASSESSMENT — LIFESTYLE VARIABLES
EVER HAD A DRINK FIRST THING IN THE MORNING TO STEADY YOUR NERVES TO GET RID OF A HANGOVER: NO
HAVE YOU EVER FELT YOU SHOULD CUT DOWN ON YOUR DRINKING: NO
EVER FELT BAD OR GUILTY ABOUT YOUR DRINKING: NO
REASON UNABLE TO ASSESS: YES
HAVE PEOPLE ANNOYED YOU BY CRITICIZING YOUR DRINKING: NO

## 2023-11-18 ASSESSMENT — PAIN SCALES - GENERAL
PAINLEVEL_OUTOF10: 9
PAINLEVEL_OUTOF10: 5 - MODERATE PAIN

## 2023-11-18 ASSESSMENT — COLUMBIA-SUICIDE SEVERITY RATING SCALE - C-SSRS
1. IN THE PAST MONTH, HAVE YOU WISHED YOU WERE DEAD OR WISHED YOU COULD GO TO SLEEP AND NOT WAKE UP?: NO
2. HAVE YOU ACTUALLY HAD ANY THOUGHTS OF KILLING YOURSELF?: NO
6. HAVE YOU EVER DONE ANYTHING, STARTED TO DO ANYTHING, OR PREPARED TO DO ANYTHING TO END YOUR LIFE?: NO

## 2023-11-18 ASSESSMENT — PAIN DESCRIPTION - LOCATION
LOCATION: BUTTOCKS
LOCATION: BACK

## 2023-11-18 ASSESSMENT — PAIN DESCRIPTION - PAIN TYPE: TYPE: CHRONIC PAIN

## 2023-11-18 ASSESSMENT — PAIN DESCRIPTION - PROGRESSION: CLINICAL_PROGRESSION: GRADUALLY WORSENING

## 2023-11-18 ASSESSMENT — PAIN - FUNCTIONAL ASSESSMENT: PAIN_FUNCTIONAL_ASSESSMENT: 0-10

## 2023-11-18 NOTE — ED PROVIDER NOTES
HPI   Chief Complaint   Patient presents with    Back Pain     Mid back pain radiating into lower back, pt is bed bound, but had falls in May. Takes gabapentin and percocet       Patient is a 23-year-old female with history of Friedreich's ataxia presents to the emergency department for complaints of back pain.  Patient states primarily on the right side.  Patient endorses that she had previous falls back in May and has been having chronic back issues since.  Patient describes it as a spasming sensation.  She states that she has been seen in the emergency department for this before and has received a back pain cocktail which does somewhat improve her pain.  Patient is denying any fevers, chills, chest pain, change in bowel or bladder habits, dysuria, hematuria, melena, hematochezia.  Patient is primarily bedbound due to her Friedreich's ataxia.      History provided by:  Patient and parent   used: No                        [place] Coma Scale Score: 15                  Patient History   No past medical history on file.  No past surgical history on file.  No family history on file.  Social History     Tobacco Use    Smoking status: Never    Smokeless tobacco: Never   Substance Use Topics    Alcohol use: Never    Drug use: Never       Physical Exam   ED Triage Vitals [11/18/23 1719]   Temp Heart Rate Resp BP   36.5 °C (97.7 °F) 80 18 143/83      SpO2 Temp src Heart Rate Source Patient Position   100 % -- -- Lying      BP Location FiO2 (%)     Left arm --       Physical Exam  Vitals and nursing note reviewed.   Constitutional:       General: She is not in acute distress.     Appearance: Normal appearance. She is not ill-appearing, toxic-appearing or diaphoretic.   HENT:      Head: Normocephalic and atraumatic.      Nose: Nose normal.      Mouth/Throat:      Mouth: Mucous membranes are moist.      Pharynx: No oropharyngeal exudate or posterior oropharyngeal erythema.   Eyes:      General: No scleral  icterus.     Extraocular Movements: Extraocular movements intact.      Pupils: Pupils are equal, round, and reactive to light.   Cardiovascular:      Rate and Rhythm: Normal rate and regular rhythm.      Pulses: Normal pulses.      Heart sounds: Normal heart sounds. No murmur heard.     No friction rub. No gallop.   Pulmonary:      Effort: Pulmonary effort is normal. No respiratory distress.      Breath sounds: Normal breath sounds. No stridor. No wheezing, rhonchi or rales.   Chest:      Chest wall: No tenderness.   Abdominal:      General: Abdomen is flat. There is no distension.      Palpations: Abdomen is soft. There is no mass.      Tenderness: There is no abdominal tenderness. There is no guarding.      Hernia: No hernia is present.   Musculoskeletal:         General: No swelling, tenderness, deformity or signs of injury. Normal range of motion.      Cervical back: Normal range of motion and neck supple. No rigidity.   Skin:     General: Skin is warm and dry.      Capillary Refill: Capillary refill takes less than 2 seconds.      Coloration: Skin is not jaundiced or pale.      Findings: No bruising, erythema, lesion or rash.   Neurological:      Mental Status: She is alert and oriented to person, place, and time. Mental status is at baseline.   Psychiatric:         Mood and Affect: Mood normal.         Behavior: Behavior normal.         ED Course & MDM   Diagnoses as of 11/18/23 2312   Low back pain without sciatica, unspecified back pain laterality, unspecified chronicity       Medical Decision Making  Patient is a 23-year-old female presenting to the emergency department for complaints of back pain and spasms.  Patient has a history of Friedreich's ataxia and is bedbound.  Patient did have some falls back in May and has been having prominent recurrent back problems since.  Patient states that despite taking her Percocet pain baclofen she is not experiencing any relief.  Patient is denying any other pain or  complaints however the differential includes possible fracture, herniated disc, muscle spasm, pyelonephritis, nephrolithiasis, constipation, colitis, UTI.  Due to this labs and CT imaging were ordered to investigate alternative causes.  Patient was given p.o. Tylenol, IV Dilaudid, IV Toradol, Lidoderm patch, IM Norflex, p.o. prednisone for symptom management.    Patient continued to have pain despite being medicated and additional Dilaudid IV was ordered.  Patient continued to have more spasms in oral Robaxin was ordered.  CBC unremarkable.  Metabolic panel with hemolyzed potassium which was repeated and normal.  Lipase is normal.  Lactate level is normal.  Magnesium level is normal.  Urinalysis negative for infection or blood.  Pregnancy is negative.  CT chest abdomen pelvis was negative for acute pathology.  CT T and L-spine negative for fractures, subluxations.  I discussed prescription medications and discharge home however the patient states that despite her baclofen and Percocet at home she has not experienced any relief and while I did offer to provide additional medications and change the muscle relaxer the patient had significant concerns about being able to function and treat her symptoms at home and given that she is primarily relying on her mother and is not able to help with transfers admission was recommended for symptom control.  Hospital service was contacted for admission.    Amount and/or Complexity of Data Reviewed  Labs: ordered. Decision-making details documented in ED Course.  Radiology: ordered. Decision-making details documented in ED Course.      Labs Reviewed   COMPREHENSIVE METABOLIC PANEL - Abnormal       Result Value    Glucose 101 (*)     Sodium 140      Potassium 5.4 (*)     Chloride 107      Bicarbonate 26      Anion Gap 12      Urea Nitrogen 6      Creatinine 0.53      eGFR >90      Calcium 9.1      Albumin 3.9      Alkaline Phosphatase 43      Total Protein 6.9      AST 39       Bilirubin, Total 0.4      ALT 22     URINALYSIS WITH REFLEX MICROSCOPIC AND CULTURE - Abnormal    Color, Urine Yellow      Appearance, Urine Clear      Specific Gravity, Urine 1.010      pH, Urine 8.0      Protein, Urine NEGATIVE      Glucose, Urine NEGATIVE      Blood, Urine NEGATIVE      Ketones, Urine 5 (TRACE) (*)     Bilirubin, Urine NEGATIVE      Urobilinogen, Urine <2.0      Nitrite, Urine NEGATIVE      Leukocyte Esterase, Urine NEGATIVE     MAGNESIUM - Normal    Magnesium 1.88     LIPASE - Normal    Lipase 9      Narrative:     Venipuncture immediately after or during the administration of Metamizole may lead to falsely low results. Testing should be performed immediately prior to Metamizole dosing.   LACTATE - Normal    Lactate 0.8      Narrative:     Venipuncture immediately after or during the administration of Metamizole may lead to falsely low results. Testing should be performed immediately  prior to Metamizole dosing.   HCG, URINE, QUALITATIVE - Normal    HCG, Urine NEGATIVE     POTASSIUM - Normal    Potassium 3.8     URINALYSIS WITH REFLEX MICROSCOPIC AND CULTURE    Narrative:     The following orders were created for panel order Urinalysis with Reflex Microscopic and Culture.  Procedure                               Abnormality         Status                     ---------                               -----------         ------                     Urinalysis with Reflex M...[[id number]]  Abnormal            Final result               Extra Urine Gray Tube[[id number]]                            Final result                 Please view results for these tests on the individual orders.   CBC WITH AUTO DIFFERENTIAL    WBC 5.9      nRBC 0.0      RBC 4.75      Hemoglobin 14.1      Hematocrit 43.8      MCV 92      MCH 29.7      MCHC 32.2      RDW 13.4      Platelets 179      Neutrophils % 64.1      Immature Granulocytes %, Automated 0.2      Lymphocytes % 25.6      Monocytes % 8.4      Eosinophils % 1.0       Basophils % 0.7      Neutrophils Absolute 3.80      Immature Granulocytes Absolute, Automated 0.01      Lymphocytes Absolute 1.52      Monocytes Absolute 0.50      Eosinophils Absolute 0.06      Basophils Absolute 0.04     EXTRA URINE GRAY TUBE    Extra Tube Hold for add-ons.       CT thoracic spine wo IV contrast   Final Result   No evidence of acute abnormality in the chest, abdomen or pelvis.        No acute thoracic or lumbar spine fracture or malalignment.        Hepatomegaly.        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 There is   a serpiginous tubular tract of hypoattenuation between the proximal   aspects of suture lines of the gastric pouch and excluded stomach.   There is also marked distention of the excluded stomach. A small   locule of air was seen interposed between the excluded stomach and   gastric pouch in this region on the prior exam. 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 Further   evaluation with nonemergent outpatient fluoroscopic upper GI exam is   suggested. A yellow alert was sent.        IUD noted in the uterus.        Incidental 4 mm nodules in the apical lungs, probably   postinflammatory in etiology given their small size and patient age.   Suggest attention on follow-up.        Additional findings as discussed above.        MACRO:   Critical Finding:  See findings. Notification was initiated on   11/18/2023 at 10:38 pm by  Tomas Wesley.  (**-YCF-**) Instructions:        Signed by: Tomas Wesley 11/18/2023 10:40 PM   Dictation workstation:   [id number]      CT lumbar spine wo IV contrast   Final Result   No evidence of acute abnormality in the chest, abdomen or pelvis.        No acute thoracic or lumbar spine fracture or malalignment.        Hepatomegaly.        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 There is   a serpiginous tubular tract of hypoattenuation between the proximal   aspects of suture lines of  the gastric pouch and excluded stomach.   There is also marked distention of the excluded stomach. A small   locule of air was seen interposed between the excluded stomach and   gastric pouch in this region on the prior exam. 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 Further   evaluation with nonemergent outpatient fluoroscopic upper GI exam is   suggested. A yellow alert was sent.        IUD noted in the uterus.        Incidental 4 mm nodules in the apical lungs, probably   postinflammatory in etiology given their small size and patient age.   Suggest attention on follow-up.        Additional findings as discussed above.        MACRO:   Critical Finding:  See findings. Notification was initiated on   11/18/2023 at 10:38 pm by  Tomas Wesley.  (**-YCF-**) Instructions:        Signed by: Tomas Wesley 11/18/2023 10:40 PM   Dictation workstation:   [id number]      CT chest abdomen pelvis w IV contrast   Final Result   No evidence of acute abnormality in the chest, abdomen or pelvis.        No acute thoracic or lumbar spine fracture or malalignment.        Hepatomegaly.        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 There is   a serpiginous tubular tract of hypoattenuation between the proximal   aspects of suture lines of the gastric pouch and excluded stomach.   There is also marked distention of the excluded stomach. A small   locule of air was seen interposed between the excluded stomach and   gastric pouch in this region on the prior exam. 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 Further   evaluation with nonemergent outpatient fluoroscopic upper GI exam is   suggested. A yellow alert was sent.        IUD noted in the uterus.        Incidental 4 mm nodules in the apical lungs, probably   postinflammatory in etiology given their small size and patient age.   Suggest attention on follow-up.        Additional findings as  discussed above.        MACRO:   Critical Finding:  See findings. Notification was initiated on   11/18/2023 at 10:38 pm by  Tomas Wesley.  (**-YCF-**) Instructions:        Signed by: Tomas Wesley 11/18/2023 10:40 PM   Dictation workstation:   [id number]            Procedure  Procedures     Italo Abad DO  11/18/23 5197

## 2023-11-19 PROBLEM — M54.50 LOW BACK PAIN WITHOUT SCIATICA, UNSPECIFIED BACK PAIN LATERALITY, UNSPECIFIED CHRONICITY: Status: ACTIVE | Noted: 2023-11-19

## 2023-11-19 LAB
ALBUMIN SERPL BCP-MCNC: 3.4 G/DL (ref 3.4–5)
ALP SERPL-CCNC: 42 U/L (ref 33–110)
ALT SERPL W P-5'-P-CCNC: 19 U/L (ref 7–45)
ANION GAP SERPL CALC-SCNC: 12 MMOL/L (ref 10–20)
AST SERPL W P-5'-P-CCNC: 16 U/L (ref 9–39)
BILIRUB SERPL-MCNC: 0.3 MG/DL (ref 0–1.2)
BUN SERPL-MCNC: 10 MG/DL (ref 6–23)
CALCIUM SERPL-MCNC: 8.6 MG/DL (ref 8.6–10.3)
CHLORIDE SERPL-SCNC: 106 MMOL/L (ref 98–107)
CO2 SERPL-SCNC: 27 MMOL/L (ref 21–32)
CREAT SERPL-MCNC: 0.54 MG/DL (ref 0.5–1.05)
ERYTHROCYTE [DISTWIDTH] IN BLOOD BY AUTOMATED COUNT: 13.2 % (ref 11.5–14.5)
GFR SERPL CREATININE-BSD FRML MDRD: >90 ML/MIN/1.73M*2
GLUCOSE SERPL-MCNC: 124 MG/DL (ref 74–99)
HCT VFR BLD AUTO: 35 % (ref 36–46)
HGB BLD-MCNC: 11.6 G/DL (ref 12–16)
HOLD SPECIMEN: NORMAL
MCH RBC QN AUTO: 29.1 PG (ref 26–34)
MCHC RBC AUTO-ENTMCNC: 33.1 G/DL (ref 32–36)
MCV RBC AUTO: 88 FL (ref 80–100)
NRBC BLD-RTO: 0 /100 WBCS (ref 0–0)
PLATELET # BLD AUTO: 204 X10*3/UL (ref 150–450)
POTASSIUM SERPL-SCNC: 3.9 MMOL/L (ref 3.5–5.3)
PROT SERPL-MCNC: 5.8 G/DL (ref 6.4–8.2)
RBC # BLD AUTO: 3.99 X10*6/UL (ref 4–5.2)
SODIUM SERPL-SCNC: 141 MMOL/L (ref 136–145)
WBC # BLD AUTO: 6.5 X10*3/UL (ref 4.4–11.3)

## 2023-11-19 PROCEDURE — 36415 COLL VENOUS BLD VENIPUNCTURE: CPT | Performed by: NURSE PRACTITIONER

## 2023-11-19 PROCEDURE — 96372 THER/PROPH/DIAG INJ SC/IM: CPT | Performed by: INTERNAL MEDICINE

## 2023-11-19 PROCEDURE — 85027 COMPLETE CBC AUTOMATED: CPT | Performed by: NURSE PRACTITIONER

## 2023-11-19 PROCEDURE — 2500000001 HC RX 250 WO HCPCS SELF ADMINISTERED DRUGS (ALT 637 FOR MEDICARE OP): Performed by: NURSE PRACTITIONER

## 2023-11-19 PROCEDURE — 99232 SBSQ HOSP IP/OBS MODERATE 35: CPT | Performed by: INTERNAL MEDICINE

## 2023-11-19 PROCEDURE — 2500000004 HC RX 250 GENERAL PHARMACY W/ HCPCS (ALT 636 FOR OP/ED): Performed by: NURSE PRACTITIONER

## 2023-11-19 PROCEDURE — 97165 OT EVAL LOW COMPLEX 30 MIN: CPT | Mod: GO

## 2023-11-19 PROCEDURE — 80053 COMPREHEN METABOLIC PANEL: CPT | Performed by: NURSE PRACTITIONER

## 2023-11-19 PROCEDURE — 97161 PT EVAL LOW COMPLEX 20 MIN: CPT | Mod: GP

## 2023-11-19 PROCEDURE — 2500000001 HC RX 250 WO HCPCS SELF ADMINISTERED DRUGS (ALT 637 FOR MEDICARE OP): Performed by: INTERNAL MEDICINE

## 2023-11-19 PROCEDURE — 2500000004 HC RX 250 GENERAL PHARMACY W/ HCPCS (ALT 636 FOR OP/ED): Performed by: INTERNAL MEDICINE

## 2023-11-19 PROCEDURE — 1210000001 HC SEMI-PRIVATE ROOM DAILY

## 2023-11-19 RX ORDER — NALOXONE HYDROCHLORIDE 1 MG/ML
0.4 INJECTION INTRAMUSCULAR; INTRAVENOUS; SUBCUTANEOUS AS NEEDED
Status: DISCONTINUED | OUTPATIENT
Start: 2023-11-19 | End: 2023-12-01 | Stop reason: HOSPADM

## 2023-11-19 RX ORDER — BACLOFEN 10 MG/1
10 TABLET ORAL NIGHTLY
COMMUNITY

## 2023-11-19 RX ORDER — OXYCODONE HCL 10 MG/1
10 TABLET, FILM COATED, EXTENDED RELEASE ORAL EVERY 12 HOURS SCHEDULED
Status: DISCONTINUED | OUTPATIENT
Start: 2023-11-19 | End: 2023-11-19

## 2023-11-19 RX ORDER — PANTOPRAZOLE SODIUM 40 MG/1
40 TABLET, DELAYED RELEASE ORAL
Status: DISCONTINUED | OUTPATIENT
Start: 2023-11-19 | End: 2023-11-23

## 2023-11-19 RX ORDER — BACLOFEN 20 MG/1
50 TABLET ORAL
Status: ON HOLD | COMMUNITY
End: 2023-11-19 | Stop reason: ENTERED-IN-ERROR

## 2023-11-19 RX ORDER — BACLOFEN 10 MG/1
10 TABLET ORAL EVERY 24 HOURS
Status: DISCONTINUED | OUTPATIENT
Start: 2023-11-19 | End: 2023-11-20

## 2023-11-19 RX ORDER — ACETAMINOPHEN 325 MG/1
650 TABLET ORAL EVERY 6 HOURS PRN
Status: DISCONTINUED | OUTPATIENT
Start: 2023-11-19 | End: 2023-12-01 | Stop reason: HOSPADM

## 2023-11-19 RX ORDER — BACLOFEN 10 MG/1
20 TABLET ORAL 2 TIMES DAILY
Status: DISCONTINUED | OUTPATIENT
Start: 2023-11-19 | End: 2023-11-20

## 2023-11-19 RX ORDER — PANTOPRAZOLE SODIUM 40 MG/10ML
40 INJECTION, POWDER, LYOPHILIZED, FOR SOLUTION INTRAVENOUS
Status: DISCONTINUED | OUTPATIENT
Start: 2023-11-19 | End: 2023-11-23

## 2023-11-19 RX ORDER — SUCRALFATE 1 G/1
1 TABLET ORAL
COMMUNITY

## 2023-11-19 RX ORDER — LAMOTRIGINE 25 MG/1
40 TABLET ORAL NIGHTLY
Status: DISCONTINUED | OUTPATIENT
Start: 2023-11-19 | End: 2023-11-19

## 2023-11-19 RX ORDER — OXYCODONE AND ACETAMINOPHEN 5; 325 MG/1; MG/1
2 TABLET ORAL EVERY 4 HOURS PRN
Status: DISCONTINUED | OUTPATIENT
Start: 2023-11-19 | End: 2023-11-19

## 2023-11-19 RX ORDER — BACLOFEN 10 MG/1
10 TABLET ORAL NIGHTLY
Status: DISCONTINUED | OUTPATIENT
Start: 2023-11-19 | End: 2023-11-19

## 2023-11-19 RX ORDER — PANTOPRAZOLE SODIUM 40 MG/1
40 TABLET, DELAYED RELEASE ORAL DAILY
Status: DISCONTINUED | OUTPATIENT
Start: 2023-11-19 | End: 2023-11-19

## 2023-11-19 RX ORDER — MORPHINE SULFATE 2 MG/ML
2 INJECTION, SOLUTION INTRAMUSCULAR; INTRAVENOUS EVERY 4 HOURS PRN
Status: DISCONTINUED | OUTPATIENT
Start: 2023-11-19 | End: 2023-11-19

## 2023-11-19 RX ORDER — ENOXAPARIN SODIUM 100 MG/ML
40 INJECTION SUBCUTANEOUS DAILY
Status: DISCONTINUED | OUTPATIENT
Start: 2023-11-19 | End: 2023-12-01 | Stop reason: HOSPADM

## 2023-11-19 RX ORDER — GABAPENTIN 600 MG/1
600 TABLET ORAL 3 TIMES DAILY
COMMUNITY
End: 2023-12-01 | Stop reason: HOSPADM

## 2023-11-19 RX ORDER — PANTOPRAZOLE SODIUM 40 MG/10ML
40 INJECTION, POWDER, LYOPHILIZED, FOR SOLUTION INTRAVENOUS DAILY
Status: DISCONTINUED | OUTPATIENT
Start: 2023-11-19 | End: 2023-11-19

## 2023-11-19 RX ORDER — BACLOFEN 20 MG/1
20 TABLET ORAL 2 TIMES DAILY
COMMUNITY

## 2023-11-19 RX ORDER — OXYCODONE AND ACETAMINOPHEN 5; 325 MG/1; MG/1
2 TABLET ORAL EVERY 6 HOURS PRN
Status: DISCONTINUED | OUTPATIENT
Start: 2023-11-19 | End: 2023-11-22

## 2023-11-19 RX ORDER — IBUPROFEN 400 MG/1
400 TABLET ORAL EVERY 4 HOURS PRN
Status: DISCONTINUED | OUTPATIENT
Start: 2023-11-19 | End: 2023-11-20

## 2023-11-19 RX ORDER — ONDANSETRON HYDROCHLORIDE 2 MG/ML
4 INJECTION, SOLUTION INTRAVENOUS EVERY 4 HOURS PRN
Status: DISCONTINUED | OUTPATIENT
Start: 2023-11-19 | End: 2023-12-01 | Stop reason: HOSPADM

## 2023-11-19 RX ORDER — GABAPENTIN 400 MG/1
800 CAPSULE ORAL EVERY 8 HOURS SCHEDULED
Status: DISCONTINUED | OUTPATIENT
Start: 2023-11-19 | End: 2023-12-01 | Stop reason: HOSPADM

## 2023-11-19 RX ORDER — METHOCARBAMOL 500 MG/1
500 TABLET, FILM COATED ORAL EVERY 6 HOURS PRN
Status: DISCONTINUED | OUTPATIENT
Start: 2023-11-19 | End: 2023-11-30

## 2023-11-19 RX ORDER — GABAPENTIN 300 MG/1
600 CAPSULE ORAL EVERY 8 HOURS SCHEDULED
Status: DISCONTINUED | OUTPATIENT
Start: 2023-11-19 | End: 2023-11-19

## 2023-11-19 RX ORDER — LAMOTRIGINE 100 MG/1
400 TABLET ORAL NIGHTLY
Status: DISCONTINUED | OUTPATIENT
Start: 2023-11-19 | End: 2023-12-01 | Stop reason: HOSPADM

## 2023-11-19 RX ORDER — FAMOTIDINE 40 MG/1
400 TABLET, FILM COATED ORAL NIGHTLY
Status: ON HOLD | COMMUNITY
End: 2023-12-01 | Stop reason: SDUPTHER

## 2023-11-19 RX ORDER — OXYCODONE HCL 10 MG/1
10 TABLET, FILM COATED, EXTENDED RELEASE ORAL 4 TIMES DAILY
COMMUNITY
End: 2023-12-01 | Stop reason: HOSPADM

## 2023-11-19 RX ORDER — OMEPRAZOLE 40 MG/1
40 CAPSULE, DELAYED RELEASE ORAL
COMMUNITY

## 2023-11-19 RX ADMIN — GABAPENTIN 800 MG: 400 CAPSULE ORAL at 21:01

## 2023-11-19 RX ADMIN — GABAPENTIN 800 MG: 400 CAPSULE ORAL at 14:22

## 2023-11-19 RX ADMIN — OXYCODONE HYDROCHLORIDE 10 MG: 10 TABLET, FILM COATED, EXTENDED RELEASE ORAL at 09:33

## 2023-11-19 RX ADMIN — ONDANSETRON 4 MG: 2 INJECTION INTRAMUSCULAR; INTRAVENOUS at 17:02

## 2023-11-19 RX ADMIN — BACLOFEN 20 MG: 10 TABLET ORAL at 09:37

## 2023-11-19 RX ADMIN — PANTOPRAZOLE SODIUM 40 MG: 40 TABLET, DELAYED RELEASE ORAL at 06:00

## 2023-11-19 RX ADMIN — LAMOTRIGINE 400 MG: 100 TABLET ORAL at 20:06

## 2023-11-19 RX ADMIN — HYDROMORPHONE HYDROCHLORIDE 0.4 MG: 1 INJECTION, SOLUTION INTRAMUSCULAR; INTRAVENOUS; SUBCUTANEOUS at 20:02

## 2023-11-19 RX ADMIN — OXYCODONE HYDROCHLORIDE AND ACETAMINOPHEN 2 TABLET: 5; 325 TABLET ORAL at 11:35

## 2023-11-19 RX ADMIN — MORPHINE SULFATE 2 MG: 2 INJECTION, SOLUTION INTRAMUSCULAR; INTRAVENOUS at 07:05

## 2023-11-19 RX ADMIN — GABAPENTIN 600 MG: 300 CAPSULE ORAL at 06:00

## 2023-11-19 RX ADMIN — GABAPENTIN 600 MG: 300 CAPSULE ORAL at 00:53

## 2023-11-19 RX ADMIN — OXYCODONE HYDROCHLORIDE AND ACETAMINOPHEN 2 TABLET: 5; 325 TABLET ORAL at 17:31

## 2023-11-19 RX ADMIN — BACLOFEN 10 MG: 10 TABLET ORAL at 21:00

## 2023-11-19 RX ADMIN — MORPHINE SULFATE 2 MG: 2 INJECTION, SOLUTION INTRAMUSCULAR; INTRAVENOUS at 02:47

## 2023-11-19 RX ADMIN — BACLOFEN 20 MG: 10 TABLET ORAL at 16:01

## 2023-11-19 RX ADMIN — PANTOPRAZOLE SODIUM 40 MG: 40 TABLET, DELAYED RELEASE ORAL at 16:02

## 2023-11-19 RX ADMIN — METHOCARBAMOL TABLETS 500 MG: 500 TABLET, COATED ORAL at 14:22

## 2023-11-19 RX ADMIN — ENOXAPARIN SODIUM 40 MG: 40 INJECTION SUBCUTANEOUS at 20:07

## 2023-11-19 RX ADMIN — OXYCODONE HYDROCHLORIDE 10 MG: 10 TABLET, FILM COATED, EXTENDED RELEASE ORAL at 00:53

## 2023-11-19 SDOH — SOCIAL STABILITY: SOCIAL INSECURITY: DOES ANYONE TRY TO KEEP YOU FROM HAVING/CONTACTING OTHER FRIENDS OR DOING THINGS OUTSIDE YOUR HOME?: NO

## 2023-11-19 SDOH — SOCIAL STABILITY: SOCIAL INSECURITY: HAS ANYONE EVER THREATENED TO HURT YOUR FAMILY OR YOUR PETS?: NO

## 2023-11-19 SDOH — SOCIAL STABILITY: SOCIAL INSECURITY: DO YOU FEEL ANYONE HAS EXPLOITED OR TAKEN ADVANTAGE OF YOU FINANCIALLY OR OF YOUR PERSONAL PROPERTY?: NO

## 2023-11-19 SDOH — SOCIAL STABILITY: SOCIAL INSECURITY: HAVE YOU HAD THOUGHTS OF HARMING ANYONE ELSE?: NO

## 2023-11-19 SDOH — SOCIAL STABILITY: SOCIAL INSECURITY: ABUSE: ADULT

## 2023-11-19 SDOH — SOCIAL STABILITY: SOCIAL INSECURITY: ARE YOU OR HAVE YOU BEEN THREATENED OR ABUSED PHYSICALLY, EMOTIONALLY, OR SEXUALLY BY ANYONE?: NO

## 2023-11-19 SDOH — SOCIAL STABILITY: SOCIAL INSECURITY: ARE THERE ANY APPARENT SIGNS OF INJURIES/BEHAVIORS THAT COULD BE RELATED TO ABUSE/NEGLECT?: NO

## 2023-11-19 SDOH — SOCIAL STABILITY: SOCIAL INSECURITY: WERE YOU ABLE TO COMPLETE ALL THE BEHAVIORAL HEALTH SCREENINGS?: YES

## 2023-11-19 SDOH — SOCIAL STABILITY: SOCIAL INSECURITY: DO YOU FEEL UNSAFE GOING BACK TO THE PLACE WHERE YOU ARE LIVING?: NO

## 2023-11-19 ASSESSMENT — PAIN DESCRIPTION - LOCATION
LOCATION: BACK
LOCATION: BACK

## 2023-11-19 ASSESSMENT — PAIN - FUNCTIONAL ASSESSMENT
PAIN_FUNCTIONAL_ASSESSMENT: 0-10

## 2023-11-19 ASSESSMENT — COGNITIVE AND FUNCTIONAL STATUS - GENERAL
CLIMB 3 TO 5 STEPS WITH RAILING: TOTAL
DRESSING REGULAR LOWER BODY CLOTHING: TOTAL
HELP NEEDED FOR BATHING: A LOT
MOVING FROM LYING ON BACK TO SITTING ON SIDE OF FLAT BED WITH BEDRAILS: A LOT
STANDING UP FROM CHAIR USING ARMS: TOTAL
DRESSING REGULAR UPPER BODY CLOTHING: A LITTLE
MOBILITY SCORE: 8
DAILY ACTIVITIY SCORE: 15
TURNING FROM BACK TO SIDE WHILE IN FLAT BAD: A LOT
MOBILITY SCORE: 8
PATIENT BASELINE BEDBOUND: YES
HELP NEEDED FOR BATHING: A LOT
TOILETING: A LOT
MOVING TO AND FROM BED TO CHAIR: TOTAL
DRESSING REGULAR LOWER BODY CLOTHING: TOTAL
DRESSING REGULAR UPPER BODY CLOTHING: A LITTLE
STANDING UP FROM CHAIR USING ARMS: TOTAL
WALKING IN HOSPITAL ROOM: TOTAL
MOVING TO AND FROM BED TO CHAIR: TOTAL
TURNING FROM BACK TO SIDE WHILE IN FLAT BAD: A LOT
WALKING IN HOSPITAL ROOM: TOTAL
DAILY ACTIVITIY SCORE: 15
MOVING FROM LYING ON BACK TO SITTING ON SIDE OF FLAT BED WITH BEDRAILS: A LOT
CLIMB 3 TO 5 STEPS WITH RAILING: TOTAL
PERSONAL GROOMING: A LITTLE
TOILETING: A LOT
PERSONAL GROOMING: A LITTLE

## 2023-11-19 ASSESSMENT — ACTIVITIES OF DAILY LIVING (ADL)
WALKS IN HOME: DEPENDENT
LACK_OF_TRANSPORTATION: NO
BATHING_ASSISTANCE: MODERATE
ADL_ASSISTANCE: NEEDS ASSISTANCE
BATHING: NEEDS ASSISTANCE
PATIENT'S MEMORY ADEQUATE TO SAFELY COMPLETE DAILY ACTIVITIES?: YES
HEARING - RIGHT EAR: FUNCTIONAL
GROOMING: NEEDS ASSISTANCE
ADEQUATE_TO_COMPLETE_ADL: YES
ADL_ASSISTANCE: NEEDS ASSISTANCE
DRESSING YOURSELF: NEEDS ASSISTANCE
HEARING - LEFT EAR: FUNCTIONAL
TOILETING: NEEDS ASSISTANCE
JUDGMENT_ADEQUATE_SAFELY_COMPLETE_DAILY_ACTIVITIES: YES
FEEDING YOURSELF: NEEDS ASSISTANCE

## 2023-11-19 ASSESSMENT — PAIN SCALES - GENERAL
PAINLEVEL_OUTOF10: 8
PAINLEVEL_OUTOF10: 9
PAINLEVEL_OUTOF10: 0 - NO PAIN
PAINLEVEL_OUTOF10: 7
PAINLEVEL_OUTOF10: 9

## 2023-11-19 ASSESSMENT — LIFESTYLE VARIABLES
HOW MANY STANDARD DRINKS CONTAINING ALCOHOL DO YOU HAVE ON A TYPICAL DAY: PATIENT DOES NOT DRINK
SKIP TO QUESTIONS 9-10: 1
HOW OFTEN DO YOU HAVE A DRINK CONTAINING ALCOHOL: NEVER
AUDIT-C TOTAL SCORE: 0
HOW OFTEN DO YOU HAVE 6 OR MORE DRINKS ON ONE OCCASION: NEVER
AUDIT-C TOTAL SCORE: 0

## 2023-11-19 ASSESSMENT — PAIN DESCRIPTION - DESCRIPTORS
DESCRIPTORS: SHOOTING
DESCRIPTORS: SHOOTING

## 2023-11-19 ASSESSMENT — PATIENT HEALTH QUESTIONNAIRE - PHQ9
SUM OF ALL RESPONSES TO PHQ9 QUESTIONS 1 & 2: 1
1. LITTLE INTEREST OR PLEASURE IN DOING THINGS: SEVERAL DAYS
2. FEELING DOWN, DEPRESSED OR HOPELESS: NOT AT ALL

## 2023-11-19 ASSESSMENT — PAIN DESCRIPTION - ORIENTATION: ORIENTATION: LOWER

## 2023-11-19 NOTE — PROGRESS NOTES
Physical Therapy    Physical Therapy Evaluation    Patient Name: Berta East  MRN: [id number]  Today's Date: 11/19/2023   Time Calculation  Start Time: 0818  Stop Time: 0838  Time Calculation (min): 20 min    Assessment/Plan   PT Assessment  PT Assessment Results: Decreased strength, Decreased endurance, Impaired balance, Decreased mobility  Rehab Prognosis: Fair  Evaluation/Treatment Tolerance: Patient limited by fatigue, Patient limited by pain  Barriers to Participation:  (pt reports too much activity causes her to regress in ability to perform transfers / mobility .)  End of Session Patient Position: Bed, 3 rail up, Alarm off, not on at start of session  IP OR SWING BED PT PLAN  Inpatient or Swing Bed: Inpatient  PT Plan  Treatment/Interventions: Bed mobility, Transfer training, Balance training, Strengthening, Endurance training, Therapeutic exercise, Therapeutic activity, Home exercise program  PT Plan: Skilled PT  PT Frequency: 2 times per week  PT Discharge Recommendations: Low intensity level of continued care  PT Recommended Transfer Status: Assist x2  PT - OK to Discharge: Yes (once medically /physically able to safely discharge to next level of care.)    Subjective     Current Problem:  Patient Active Problem List   Diagnosis    Back pain due to injury    Low back pain without sciatica, unspecified back pain laterality, unspecified chronicity       General Visit Information:  General  Reason for Referral: impaired mobility  Referred By: Handy 11/18, OT/PT  Past Medical History Relevant to Rehab: Friedreichs ataxia, Rayo-en-y gastric bypass  Family/Caregiver Present: No  Prior to Session Communication: Bedside nurse (cleared to see pt for eval)  Patient Position Received: Bed, 3 rail up, Alarm off, not on at start of session  General Comment: Pt. is 24 y/o female to ED with c/o back pain on R side; progressively worseing since a fall in May. CT chest: 4mm nodules in lungs, hepatomegaly, no acute  findings, CT T/L spine: no acute abnormality (Pt. states that she was working with OP PT 2x/wk working on standing with parallel bars and various equipment. Pt. states that there is a fine line between doing therapy and her symptom flare ups.)    Home Living:  Home Living  Type of Home: House (side by side duplex with her mom .  pt lives with a friend)  Lives With: Friends  Home Adaptive Equipment: Walker rolling or standard, Wheelchair-power (adujustable bed , mikki and slide board)  Home Layout: Two level, Able to live on main level with bedroom/bathroom  Home Access: Ramped entrance  Bathroom Shower/Tub: Walk-in shower  Bathroom Equipment: Grab bars in shower, Shower chair with back    Prior Level of Function:  Prior Function Per Pt/Caregiver Report  Level of [place]: Needs assistance with ADLs, Needs assistance with homemaking, Needs assistance with functional transfers  Receives Help From: Family  ADL Assistance: Needs assistance  Homemaking Assistance: Needs assistance  Ambulatory Assistance:  (Dependent ambulation uses a power wc.)  Transfers:  (pt. states that mom stand pivots pt. or when pt. is having a 'good day' she is able to slide board herself to BSC or PWC)  Prior Function Comments: Pt. mother performs all IADLs and assists with ADLs. No falls since May    Precautions:  Precautions  Medical Precautions: Fall precautions  Braces Applied: Pt. statest that she has B foot braces/shoes that she has to wear where transferring or up on her feet; pt. abducts from hips through ankles without braces. (No braces here at time of evauation, pt. states mother is to bring them today.)  Precautions Comment: telemetry    Objective     Pain:  Pain Assessment  Pain Assessment: 0-10  Pain Score: 8  Pain Type: Neuropathic pain (pain down BLEs)  Pain Location: Leg  Pain Orientation: Right, Left  Pain Descriptors: Shooting    Cognition:  Cognition  Overall Cognitive Status: Within Functional Limits    General  Assessments:      Activity Tolerance  Endurance: Decreased tolerance for upright activites  Activity Tolerance Comments: Pt. states that she is unable to sit for periods of time unsupported when BLEs abduct     Strength  Strength Comments: BLE 0/5 to 1-/5  increased tone BLEs . Bilat feet want to invert     Static Sitting Balance  Static Sitting-Comment/Number of Minutes: poor+  Dynamic Sitting Balance  Dynamic Sitting-Comments: poor    Functional Assessments:     Bed Mobility  Bed Mobility: Yes  Bed Mobility 1  Bed Mobility 1: Sitting to supine, Supine to sitting, Scooting  Level of Assistance 1: Moderate assistance (x2 person to get EOB)  Bed Mobility Comments 1: max A x 2 to get back into bed  Transfers  Transfer: No (Unable to attempt transfers the date due to B abduction of hips to ankles without braces; Pt. unable to tolerate sitting for longer periods of tme due to increased pain in LEs when abducted)  Ambulation/Gait Training  Ambulation/Gait Training Performed: No    Extremity/Trunk Assessments:    RLE   RLE : Within Functional Limits (PROM)  LLE   LLE : Within Functional Limits (PROM)    Outcome Measures:  [facility] Basic Mobility  Turning from your back to your side while in a flat bed without using bedrails: A lot  Moving from lying on your back to sitting on the side of a flat bed without using bedrails: A lot  Moving to and from bed to chair (including a wheelchair): Total  Standing up from a chair using your arms (e.g. wheelchair or bedside chair): Total  To walk in hospital room: Total  Climbing 3-5 steps with railing: Total  Basic Mobility - Total Score: 8    Goals:  Encounter Problems       Encounter Problems (Active)       PT Problem       Pt will demonstrate mod A x 1 with bed mobility to edge of bed.   (Progressing)       Start:  11/19/23    Expected End:  12/03/23            Pt will demonstrate  slideboard transfers with mod A x 1 to WC .   (Progressing)       Start:  11/19/23    Expected End:   12/03/23            pt to demo sitting balance of fair to aide in slideboard transfers .  (Progressing)       Start:  11/19/23    Expected End:  12/03/23               Pain - Adult            Education Documentation  Mobility Training, taught by Austin Santana, PT at 11/19/2023 11:04 AM.  Learner: Patient  Readiness: Acceptance  Method: Explanation  Response: Verbalizes Understanding    Education Comments  No comments found.

## 2023-11-19 NOTE — H&P
Medical Group History and Physical    ASSESSMENT & PLAN:       Uncontrolled Back Pain  Friedreich Ataxia  Mechanical fall March of this year and has had issues with back pain ever since. No loss of B/B function, sensation/temp intact, CT negative for acute ortho findings. Family does not feel safe taking her home with pain uncontrolled?  - pain control - may need to establish care with pain management   - PT/OT     VTE Prophylaxis: not recommended, SCDs while in bed    RAMAN Contreras-CNP    HISTORY OF PRESENT ILLNESS:   Chief Complaint: Uncontrolled back pain    History Of Present Illness:    Berta East is a 23 y.o. female with a significant past medical history of Friedreich Ataxia who presented to [facility] with complaints of uncontrolled back pain.     She reports being bedbound, although mechanical fall back in March which aggravated her chronic symptoms.  Today's symptoms began this afternoon w/o known cause.  She has been treated with NSAIDs, Dilaudid, Tylenol, and steroids without relief; CT scan showed no acute changes in chest, abdomen or pelvis, no thoracic or lumbar spine fracture or malalignment.  Chronic changes noted under impression, yellow alert for findings that would suggest gastrogastric fistula further evaluation with nonemergent outpatient fluoroscopic upper GI exam is suggested. Labs work is unremarkable. No UTI.    Review of systems: 10 point review of systems is otherwise negative except as mentioned above.    PAST HISTORIES:       Past Medical History:  Medical Problems       Problem List       * (Principal) Back pain due to injury           Past Surgical History:  No past surgical history on file.       Social History:  She reports that she has never smoked. She has never used smokeless tobacco. She reports that she does not drink alcohol and does not use drugs.    Family History:  No family history on file.     Allergies:  Patient has no known  "allergies.    OBJECTIVE:       Last Recorded Vitals:  Vitals:    11/18/23 1719   BP: 143/83   BP Location: Left arm   Patient Position: Lying   Pulse: 80   Resp: 18   Temp: 36.5 °C (97.7 °F)   SpO2: 100%   Weight: 83.9 kg (185 lb)   Height: 1.753 m (5' 9\")       Last I/O:  No intake/output data recorded.    Physical Exam  Constitutional:       General: She is awake.      Appearance: Normal appearance.   HENT:      Head: Normocephalic and atraumatic.      Mouth/Throat:      Mouth: Mucous membranes are moist.      Pharynx: Oropharynx is clear.   Eyes:      Extraocular Movements: Extraocular movements intact.      Conjunctiva/sclera: Conjunctivae normal.      Pupils: Pupils are equal, round, and reactive to light.   Cardiovascular:      Rate and Rhythm: Normal rate and regular rhythm.      Pulses: Normal pulses.      Heart sounds: Normal heart sounds.   Pulmonary:      Effort: Pulmonary effort is normal.      Breath sounds: Normal breath sounds.   Abdominal:      General: Abdomen is flat. Bowel sounds are normal.      Palpations: Abdomen is soft.   Musculoskeletal:         General: Normal range of motion.      Cervical back: Normal range of motion and neck supple.   Skin:     General: Skin is warm and dry.      Capillary Refill: Capillary refill takes less than 2 seconds.   Neurological:      General: No focal deficit present.      Mental Status: She is alert and oriented to person, place, and time. Mental status is at baseline.      Comments: Slurred speech at baseline, sensation and temperature intact.   Psychiatric:         Mood and Affect: Mood normal.         Behavior: Behavior normal. Behavior is cooperative.         Thought Content: Thought content normal.         Judgment: Judgment normal.           Scheduled Medications  lidocaine, 1 patch, transdermal, Once      PRN Medications    Continuous Medications       Outpatient Medications:  Prior to Admission medications    Not on File       LABS AND IMAGING: "     Labs:  Results for orders placed or performed during the hospital encounter of 11/18/23 (from the past 24 hour(s))   CBC and Auto Differential   Result Value Ref Range    WBC 5.9 4.4 - 11.3 x10*3/uL    nRBC 0.0 0.0 - 0.0 /100 WBCs    RBC 4.75 4.00 - 5.20 x10*6/uL    Hemoglobin 14.1 12.0 - 16.0 g/dL    Hematocrit 43.8 36.0 - 46.0 %    MCV 92 80 - 100 fL    MCH 29.7 26.0 - 34.0 pg    MCHC 32.2 32.0 - 36.0 g/dL    RDW 13.4 11.5 - 14.5 %    Platelets 179 150 - 450 x10*3/uL    Neutrophils % 64.1 40.0 - 80.0 %    Immature Granulocytes %, Automated 0.2 0.0 - 0.9 %    Lymphocytes % 25.6 13.0 - 44.0 %    Monocytes % 8.4 2.0 - 10.0 %    Eosinophils % 1.0 0.0 - 6.0 %    Basophils % 0.7 0.0 - 2.0 %    Neutrophils Absolute 3.80 1.20 - 7.70 x10*3/uL    Immature Granulocytes Absolute, Automated 0.01 0.00 - 0.70 x10*3/uL    Lymphocytes Absolute 1.52 1.20 - 4.80 x10*3/uL    Monocytes Absolute 0.50 0.10 - 1.00 x10*3/uL    Eosinophils Absolute 0.06 0.00 - 0.70 x10*3/uL    Basophils Absolute 0.04 0.00 - 0.10 x10*3/uL   Magnesium   Result Value Ref Range    Magnesium 1.88 1.60 - 2.40 mg/dL   Comprehensive metabolic panel   Result Value Ref Range    Glucose 101 (H) 74 - 99 mg/dL    Sodium 140 136 - 145 mmol/L    Potassium 5.4 (H) 3.5 - 5.3 mmol/L    Chloride 107 98 - 107 mmol/L    Bicarbonate 26 21 - 32 mmol/L    Anion Gap 12 10 - 20 mmol/L    Urea Nitrogen 6 6 - 23 mg/dL    Creatinine 0.53 0.50 - 1.05 mg/dL    eGFR >90 >60 mL/min/1.73m*2    Calcium 9.1 8.6 - 10.3 mg/dL    Albumin 3.9 3.4 - 5.0 g/dL    Alkaline Phosphatase 43 33 - 110 U/L    Total Protein 6.9 6.4 - 8.2 g/dL    AST 39 9 - 39 U/L    Bilirubin, Total 0.4 0.0 - 1.2 mg/dL    ALT 22 7 - 45 U/L   Lipase   Result Value Ref Range    Lipase 9 9 - 82 U/L   Lactate   Result Value Ref Range    Lactate 0.8 0.4 - 2.0 mmol/L   Potassium   Result Value Ref Range    Potassium 3.8 3.5 - 5.3 mmol/L   hCG, Urine, Qualitative   Result Value Ref Range    HCG, Urine NEGATIVE NEGATIVE    Urinalysis with Reflex Microscopic and Culture   Result Value Ref Range    Color, Urine Yellow Straw, Yellow    Appearance, Urine Clear Clear    Specific Gravity, Urine 1.010 1.005 - 1.035    pH, Urine 8.0 5.0, 5.5, 6.0, 6.5, 7.0, 7.5, 8.0    Protein, Urine NEGATIVE NEGATIVE mg/dL    Glucose, Urine NEGATIVE NEGATIVE mg/dL    Blood, Urine NEGATIVE NEGATIVE    Ketones, Urine 5 (TRACE) (A) NEGATIVE mg/dL    Bilirubin, Urine NEGATIVE NEGATIVE    Urobilinogen, Urine <2.0 <2.0 mg/dL    Nitrite, Urine NEGATIVE NEGATIVE    Leukocyte Esterase, Urine NEGATIVE NEGATIVE   Extra Urine Gray Tube   Result Value Ref Range    Extra Tube Hold for add-ons.         Imaging:  CT thoracic spine wo IV contrast   Final Result   No evidence of acute abnormality in the chest, abdomen or pelvis.        No acute thoracic or lumbar spine fracture or malalignment.        Hepatomegaly.        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 There is   a serpiginous tubular tract of hypoattenuation between the proximal   aspects of suture lines of the gastric pouch and excluded stomach.   There is also marked distention of the excluded stomach. A small   locule of air was seen interposed between the excluded stomach and   gastric pouch in this region on the prior exam. 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 Further   evaluation with nonemergent outpatient fluoroscopic upper GI exam is   suggested. A yellow alert was sent.        IUD noted in the uterus.        Incidental 4 mm nodules in the apical lungs, probably   postinflammatory in etiology given their small size and patient age.   Suggest attention on follow-up.        Additional findings as discussed above.        MACRO:   Critical Finding:  See findings. Notification was initiated on   11/18/2023 at 10:38 pm by  Tomas Wesley.  (**-YCF-**) Instructions:        Signed by: Tomas Wesley 11/18/2023 10:40 PM   Dictation workstation:   [id number]       CT lumbar spine wo IV contrast   Final Result   No evidence of acute abnormality in the chest, abdomen or pelvis.        No acute thoracic or lumbar spine fracture or malalignment.        Hepatomegaly.        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 There is   a serpiginous tubular tract of hypoattenuation between the proximal   aspects of suture lines of the gastric pouch and excluded stomach.   There is also marked distention of the excluded stomach. A small   locule of air was seen interposed between the excluded stomach and   gastric pouch in this region on the prior exam. 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 Further   evaluation with nonemergent outpatient fluoroscopic upper GI exam is   suggested. A yellow alert was sent.        IUD noted in the uterus.        Incidental 4 mm nodules in the apical lungs, probably   postinflammatory in etiology given their small size and patient age.   Suggest attention on follow-up.        Additional findings as discussed above.        MACRO:   Critical Finding:  See findings. Notification was initiated on   11/18/2023 at 10:38 pm by  Tomas Wesley.  (**-YCF-**) Instructions:        Signed by: Tomas Wesley 11/18/2023 10:40 PM   Dictation workstation:   [id number]      CT chest abdomen pelvis w IV contrast   Final Result   No evidence of acute abnormality in the chest, abdomen or pelvis.        No acute thoracic or lumbar spine fracture or malalignment.        Hepatomegaly.        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 There is   a serpiginous tubular tract of hypoattenuation between the proximal   aspects of suture lines of the gastric pouch and excluded stomach.   There is also marked distention of the excluded stomach. A small   locule of air was seen interposed between the excluded stomach and   gastric pouch in this region on the prior exam. 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 Further   evaluation with nonemergent outpatient fluoroscopic upper GI exam is   suggested. A yellow alert was sent.        IUD noted in the uterus.        Incidental 4 mm nodules in the apical lungs, probably   postinflammatory in etiology given their small size and patient age.   Suggest attention on follow-up.        Additional findings as discussed above.        MACRO:   Critical Finding:  See findings. Notification was initiated on   11/18/2023 at 10:38 pm by  Tomas Wesley.  (**-YCF-**) Instructions:        Signed by: Tomas Wesley 11/18/2023 10:40 PM   Dictation workstation:   [id number]

## 2023-11-19 NOTE — PROGRESS NOTES
Occupational Therapy    Evaluation    Patient Name: Berta East  MRN: [id number]  Today's Date: 11/19/2023  Time Calculation  Start Time: 0817  Stop Time: 0838  Time Calculation (min): 21 min        Assessment:  Evaluation/Treatment Tolerance: Treatment limited secondary to medical complications (Comment) (Pt. has progressive disorder; Friedrichs ataxia)  End of Session Patient Position: Bed, 3 rail up, Alarm off, not on at start of session  Strengths: Ability to acquire knowledge, Attitude of self, Access to adaptive/assistive products, Coping skills, Insight into problems, Support of Caregivers  Barriers to Participation:  (Pt. states that when she overdoes activity she begins to have flare ups of pain, weakness, muscle spasms, and ataxia. Pt. states that sometimes when she even just takes a shower she will be overdoing it for the rest of the day.)  Plan:  Treatment Interventions: ADL retraining, Endurance training, Compensatory technique education, Patient/family training  OT Frequency: 2 times per week  OT Discharge Recommendations: Low intensity level of continued care (24/7 supervision and assistance;)  OT - OK to Discharge:  (When pt. is deemed medically stable by  and to recommended level of care)      Subjective   Current Problem:  1. Low back pain without sciatica, unspecified back pain laterality, unspecified chronicity          General:  General  Reason for Referral: ADL impairment  Referred By: Handy 11/18, OT/PT  Past Medical History Relevant to Rehab: Friedreichs ataxia, Rayo-en-y gastric bypass  Family/Caregiver Present: No  Prior to Session Communication: Bedside nurse  Patient Position Received: Bed, 3 rail up, Alarm off, not on at start of session  General Comment: Pt. is 24 y/o female to ED with c/o back pain on R side; progressively worseing since a fall in May. CT chest: 4mm nodules in lungs, hepatomegaly, no acute findings, CT T/L spine: no acute abnormality (Pt. states that she was  working with OP PT 2x/wk working on standing with parallel bars and various equipment. Pt. states that there is a fine line between doing therapy and her symptom flare ups.)  Precautions:  Medical Precautions: Fall precautions  Braces Applied: Pt. statest that she has B foot braces/shoes that she has to wear where transferring or up on her feet; pt. abducts from hips through ankles without braces. (No braces here at time of evauation, pt. states mother is to bring them today.)  Precautions Comment: telemetry  Vital Signs:     Pain:  Pain Assessment  Pain Assessment: 0-10  Pain Score: 8  Pain Type: Neuropathic pain, Referred pain, Chronic pain, Acute pain (shooting down B legs, worse on R)  Pain Location: Leg  Pain Orientation: Right, Left  Pain Descriptors: Shooting    Objective   Cognition:  Overall Cognitive Status: Within Functional Limits           Home Living:  Type of Home: House (Side by side duplex)  Lives With: Friends (Mom and brother live on other side of duplex\)  Home Adaptive Equipment: Walker rolling or standard, Wheelchair-power (adjustable bed. Zi lift)  Home Layout: Two level, Able to live on main level with bedroom/bathroom  Home Access: Ramped entrance  Bathroom Shower/Tub: Walk-in shower  Bathroom Equipment: Grab bars in shower, Shower chair with back  Home Living Comments: pt. stays on main floor.  Prior Function:  Level of [place]: Needs assistance with ADLs, Needs assistance with homemaking, Needs assistance with functional transfers  Receives Help From: Family (Mom is caregiver)  ADL Assistance: Needs assistance  Homemaking Assistance: Needs assistance  Ambulatory Assistance: Needs assistance (uses power wheelchair for mobility)  Transfers:  (pt. states that mom stand pivots pt. or when pt. is having a 'good day' she is able to slide board herself to BSC or PWC)  Prior Function Comments: Pt. mother performs all IADLs and assists with ADLs. No falls since May  IADL History:  IADL  Comments: Does not drive. Mom assists with all IADLs; pt. states that she likes to assist in kitchen when she can.  ADL:  Eating Assistance: Independent  Grooming Assistance: Minimal (sitting)  Bathing Assistance: Moderate  UE Dressing Assistance: Minimal  LE Dressing Assistance: Total  Toileting Assistance with Device: Maximal  Activity Tolerance:  Endurance: Decreased tolerance for upright activites  Activity Tolerance Comments: Pt. states that she is unable to sit for periods of time unsupported when BLEs abduct  Bed Mobility/Transfers: Bed Mobility  Bed Mobility: Yes  Bed Mobility 1  Bed Mobility 1: Supine to sitting  Level of Assistance 1: Moderate assistance  Bed Mobility Comments 1: x2 assist to bring LEs to edge of bed as well as elevate trunk to upright sitting.  Bed Mobility 2  Bed Mobility  2: Sitting to supine  Level of Assistance 2: Maximum assistance  Bed Mobility Comments 2: x2 to bring LEs into bed and placement of trunk. Pt. able to assist with boosting with use of UEs.    Transfers  Transfer: No (Unable to attempt transfers the date due to B abduction of hips to ankles without braces; Pt. unable to tolerate sitting for longer periods of tme due to increased pain in LEs when abducted)      Ambulation/Gait Training:  Ambulation/Gait Training  Ambulation/Gait Training Performed: No  Sitting Balance:  Static Sitting Balance  Static Sitting-Balance Support:  (SBA with UE support and Fair +. WIthout UE support Min to Mod A for maintaining balance)  Dynamic Sitting Balance  Dynamic Sitting-Balance Support:  (Min to Mod A; Fair -)        Sensation:  Sensation Comment: Pt. states that she has neuropathy in BLEs  Strength:  Strength Comments: Loan 4/5 MMT       Coordination:  Coordination Comment: tone in BLEs        Extremities: RUE   RUE : Within Functional Limits and LUE   LUE: Within Functional Limits      Outcome Measures:[facility] Daily Activity  Putting on and taking off regular lower body clothing:  Total  Bathing (including washing, rinsing, drying): A lot  Putting on and taking off regular upper body clothing: A little (sitting)  Toileting, which includes using toilet, bedpan or urinal: A lot  Taking care of personal grooming such as brushing teeth: A little (sitting)  Eating Meals: None  Daily Activity - Total Score: 15        Education Documentation  Home Exercise Program, taught by Lyudmila Rivera OT at 11/19/2023 10:31 AM.  Learner: Patient  Readiness: Acceptance  Method: Explanation  Response: Verbalizes Understanding    ADL Training, taught by Lyudmila Rivera OT at 11/19/2023 10:31 AM.  Learner: Patient  Readiness: Acceptance  Method: Explanation  Response: Verbalizes Understanding    Education Comments  No comments found.        IP EDUCATION:  Education  Individual(s) Educated: Patient  Education Provided: Risk and benefits of OT discussed with patient or other, POC discussed and agreed upon, Fall precautons  Risk and Benefits Discussed with Patient/Caregiver/Other: yes  Patient/Caregiver Demonstrated Understanding: yes  Plan of Care Discussed and Agreed Upon: yes  Patient Response to Education: Patient/Caregiver Verbalized Understanding of Information  Education Comment: Educated on balance between conservative and maintance for medical condition at this time vs. therapy an dbenfit of both.    Goals:  Encounter Problems       Encounter Problems (Active)       OT Goals       Min A for slide board transfers bed<>BSC<>W/C (Progressing)       Start:  11/19/23    Expected End:  12/03/23            Mod I for carryover of BUE therapeutic exercise HEP for carryover of slide board transfers and assist with ADLs (Progressing)       Start:  11/19/23    Expected End:  12/03/23            Fair + dyn sitting EOB for x10 minutes ADL participation.  (Progressing)       Start:  11/19/23    Expected End:  12/03/23

## 2023-11-20 ENCOUNTER — ANESTHESIA (OUTPATIENT)
Dept: INPATIENT UNIT | Facility: HOSPITAL | Age: 23
DRG: 552 | End: 2023-11-20
Payer: MEDICARE

## 2023-11-20 ENCOUNTER — ANESTHESIA EVENT (OUTPATIENT)
Dept: INPATIENT UNIT | Facility: HOSPITAL | Age: 23
DRG: 552 | End: 2023-11-20
Payer: MEDICARE

## 2023-11-20 PROCEDURE — 2500000001 HC RX 250 WO HCPCS SELF ADMINISTERED DRUGS (ALT 637 FOR MEDICARE OP): Performed by: INTERNAL MEDICINE

## 2023-11-20 PROCEDURE — 1210000001 HC SEMI-PRIVATE ROOM DAILY

## 2023-11-20 PROCEDURE — 94640 AIRWAY INHALATION TREATMENT: CPT | Performed by: ANESTHESIOLOGY

## 2023-11-20 PROCEDURE — 96372 THER/PROPH/DIAG INJ SC/IM: CPT | Performed by: INTERNAL MEDICINE

## 2023-11-20 PROCEDURE — 97530 THERAPEUTIC ACTIVITIES: CPT | Mod: GP,CQ

## 2023-11-20 PROCEDURE — 2500000004 HC RX 250 GENERAL PHARMACY W/ HCPCS (ALT 636 FOR OP/ED): Performed by: INTERNAL MEDICINE

## 2023-11-20 PROCEDURE — 99232 SBSQ HOSP IP/OBS MODERATE 35: CPT | Performed by: INTERNAL MEDICINE

## 2023-11-20 RX ORDER — BACLOFEN 10 MG/1
20 TABLET ORAL 2 TIMES DAILY
Status: DISCONTINUED | OUTPATIENT
Start: 2023-11-20 | End: 2023-12-01 | Stop reason: HOSPADM

## 2023-11-20 RX ORDER — DICLOFENAC SODIUM 10 MG/G
4 GEL TOPICAL 3 TIMES DAILY
Status: DISCONTINUED | OUTPATIENT
Start: 2023-11-20 | End: 2023-12-01 | Stop reason: HOSPADM

## 2023-11-20 RX ORDER — BACLOFEN 10 MG/1
10 TABLET ORAL EVERY 24 HOURS
Status: DISCONTINUED | OUTPATIENT
Start: 2023-11-20 | End: 2023-11-28

## 2023-11-20 RX ORDER — POLYETHYLENE GLYCOL 3350 17 G/17G
17 POWDER, FOR SOLUTION ORAL DAILY
Status: DISCONTINUED | OUTPATIENT
Start: 2023-11-20 | End: 2023-12-01 | Stop reason: HOSPADM

## 2023-11-20 RX ADMIN — OXYCODONE HYDROCHLORIDE AND ACETAMINOPHEN 2 TABLET: 5; 325 TABLET ORAL at 08:20

## 2023-11-20 RX ADMIN — OXYCODONE HYDROCHLORIDE AND ACETAMINOPHEN 2 TABLET: 5; 325 TABLET ORAL at 00:01

## 2023-11-20 RX ADMIN — BACLOFEN 10 MG: 10 TABLET ORAL at 14:48

## 2023-11-20 RX ADMIN — GABAPENTIN 800 MG: 400 CAPSULE ORAL at 14:23

## 2023-11-20 RX ADMIN — METHOCARBAMOL TABLETS 500 MG: 500 TABLET, COATED ORAL at 00:02

## 2023-11-20 RX ADMIN — METHOCARBAMOL TABLETS 500 MG: 500 TABLET, COATED ORAL at 20:55

## 2023-11-20 RX ADMIN — GABAPENTIN 800 MG: 400 CAPSULE ORAL at 22:30

## 2023-11-20 RX ADMIN — PANTOPRAZOLE SODIUM 40 MG: 40 TABLET, DELAYED RELEASE ORAL at 06:33

## 2023-11-20 RX ADMIN — HYDROMORPHONE HYDROCHLORIDE 0.4 MG: 1 INJECTION, SOLUTION INTRAMUSCULAR; INTRAVENOUS; SUBCUTANEOUS at 22:31

## 2023-11-20 RX ADMIN — GABAPENTIN 800 MG: 400 CAPSULE ORAL at 06:33

## 2023-11-20 RX ADMIN — PANTOPRAZOLE SODIUM 40 MG: 40 TABLET, DELAYED RELEASE ORAL at 16:45

## 2023-11-20 RX ADMIN — HYDROMORPHONE HYDROCHLORIDE 0.4 MG: 1 INJECTION, SOLUTION INTRAMUSCULAR; INTRAVENOUS; SUBCUTANEOUS at 16:45

## 2023-11-20 RX ADMIN — OXYCODONE HYDROCHLORIDE AND ACETAMINOPHEN 2 TABLET: 5; 325 TABLET ORAL at 14:23

## 2023-11-20 RX ADMIN — ONDANSETRON 4 MG: 2 INJECTION INTRAMUSCULAR; INTRAVENOUS at 13:08

## 2023-11-20 RX ADMIN — ONDANSETRON 4 MG: 2 INJECTION INTRAMUSCULAR; INTRAVENOUS at 18:29

## 2023-11-20 RX ADMIN — HYDROMORPHONE HYDROCHLORIDE 0.4 MG: 1 INJECTION, SOLUTION INTRAMUSCULAR; INTRAVENOUS; SUBCUTANEOUS at 11:23

## 2023-11-20 RX ADMIN — METHOCARBAMOL TABLETS 500 MG: 500 TABLET, COATED ORAL at 13:37

## 2023-11-20 RX ADMIN — DICLOFENAC SODIUM 1 APPLICATION: 10 GEL TOPICAL at 20:56

## 2023-11-20 RX ADMIN — POLYETHYLENE GLYCOL 3350 17 G: 17 POWDER, FOR SOLUTION ORAL at 14:47

## 2023-11-20 RX ADMIN — BACLOFEN 20 MG: 10 TABLET ORAL at 08:20

## 2023-11-20 RX ADMIN — LAMOTRIGINE 400 MG: 100 TABLET ORAL at 20:55

## 2023-11-20 RX ADMIN — BACLOFEN 20 MG: 10 TABLET ORAL at 20:54

## 2023-11-20 RX ADMIN — DICLOFENAC SODIUM 1 APPLICATION: 10 GEL TOPICAL at 14:48

## 2023-11-20 RX ADMIN — OXYCODONE HYDROCHLORIDE AND ACETAMINOPHEN 2 TABLET: 5; 325 TABLET ORAL at 20:54

## 2023-11-20 RX ADMIN — ONDANSETRON 4 MG: 2 INJECTION INTRAMUSCULAR; INTRAVENOUS at 22:31

## 2023-11-20 RX ADMIN — HYDROMORPHONE HYDROCHLORIDE 0.4 MG: 1 INJECTION, SOLUTION INTRAMUSCULAR; INTRAVENOUS; SUBCUTANEOUS at 01:23

## 2023-11-20 RX ADMIN — ENOXAPARIN SODIUM 40 MG: 40 INJECTION SUBCUTANEOUS at 20:55

## 2023-11-20 ASSESSMENT — COGNITIVE AND FUNCTIONAL STATUS - GENERAL
CLIMB 3 TO 5 STEPS WITH RAILING: TOTAL
STANDING UP FROM CHAIR USING ARMS: TOTAL
STANDING UP FROM CHAIR USING ARMS: TOTAL
MOVING FROM LYING ON BACK TO SITTING ON SIDE OF FLAT BED WITH BEDRAILS: A LOT
TURNING FROM BACK TO SIDE WHILE IN FLAT BAD: A LOT
DAILY ACTIVITIY SCORE: 15
TURNING FROM BACK TO SIDE WHILE IN FLAT BAD: A LOT
CLIMB 3 TO 5 STEPS WITH RAILING: TOTAL
DRESSING REGULAR UPPER BODY CLOTHING: A LITTLE
DRESSING REGULAR UPPER BODY CLOTHING: A LITTLE
MOVING TO AND FROM BED TO CHAIR: TOTAL
MOBILITY SCORE: 8
DRESSING REGULAR LOWER BODY CLOTHING: TOTAL
HELP NEEDED FOR BATHING: A LOT
MOBILITY SCORE: 8
WALKING IN HOSPITAL ROOM: TOTAL
TOILETING: A LOT
TOILETING: A LOT
MOVING TO AND FROM BED TO CHAIR: TOTAL
DRESSING REGULAR LOWER BODY CLOTHING: TOTAL
TURNING FROM BACK TO SIDE WHILE IN FLAT BAD: A LOT
PERSONAL GROOMING: A LITTLE
DAILY ACTIVITIY SCORE: 15
MOVING FROM LYING ON BACK TO SITTING ON SIDE OF FLAT BED WITH BEDRAILS: A LOT
PERSONAL GROOMING: A LITTLE
CLIMB 3 TO 5 STEPS WITH RAILING: TOTAL
MOVING FROM LYING ON BACK TO SITTING ON SIDE OF FLAT BED WITH BEDRAILS: A LOT
WALKING IN HOSPITAL ROOM: TOTAL
WALKING IN HOSPITAL ROOM: TOTAL
MOBILITY SCORE: 8
STANDING UP FROM CHAIR USING ARMS: TOTAL
HELP NEEDED FOR BATHING: A LOT
MOVING TO AND FROM BED TO CHAIR: TOTAL

## 2023-11-20 ASSESSMENT — PAIN SCALES - GENERAL
PAINLEVEL_OUTOF10: 10 - WORST POSSIBLE PAIN
PAINLEVEL_OUTOF10: 8
PAINLEVEL_OUTOF10: 9
PAINLEVEL_OUTOF10: 10 - WORST POSSIBLE PAIN
PAINLEVEL_OUTOF10: 8
PAINLEVEL_OUTOF10: 8
PAINLEVEL_OUTOF10: 9
PAINLEVEL_OUTOF10: 8

## 2023-11-20 ASSESSMENT — PAIN - FUNCTIONAL ASSESSMENT
PAIN_FUNCTIONAL_ASSESSMENT: 0-10

## 2023-11-20 ASSESSMENT — PAIN DESCRIPTION - LOCATION: LOCATION: BACK

## 2023-11-20 ASSESSMENT — PAIN DESCRIPTION - DESCRIPTORS: DESCRIPTORS: ACHING

## 2023-11-20 NOTE — PROGRESS NOTES
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  - has chronic issues with low back pain 2/2 lumbar radiculopathy and muscle spasms, has known lumbar spondylosis with documented L4/L5 disc protrusion  - home regimen is Percocet 10mg QID, baclofen 20/10/20, gabapentin 600mg tid, and lamotrigine 400mg at bedtime  - follows with pain  and PM+R for steroid injections/nerve blocks  - she is supposed to see [facility] physician for possible nerve ablation as outpt  - baseline wheelchair bound  - CT C/A/P, CT T/L spine neg for acute findings this admission  - no red flag symptoms to warrant rpt MRI at this time  Plan:  - cont home Percocet Q6H PRN, with IV Dilaudid added for breakthrough, PRN Narcan ordered, daily Miralax  - cont with increased gabapentin dose of 800mg TID, cont home Lamictal at bedtime for neuropathic pain  - cont home Baclofen, add Robaxin as well for breakthrough muscle spasms  - topical voltaren gel  - will avoid systemic NSAIDs and steroids due to history of gastric pouch ulceration  - PT/OT  - pain mgmt consulted, per their note, possible interventional pain procedure to be done    Vte ppx lovenox    Juan Carlos Sagastume MD    SUBJECTIVE     NAEON. Still having pain but says she feels like she is making progress.       OBJECTIVE:       Last Recorded Vitals:  Vitals:    11/19/23 1449 11/19/23 1927 11/20/23 0001 11/20/23 0742   BP: 100/51 113/53 130/77 99/56   Patient Position:   Sitting    Pulse: 66 87 100 74   Resp: 16 16 16    Temp: 36.6 °C (97.9 °F) 36.5 °C (97.7 °F) 36.4 °C (97.5 °F) 36.2 °C (97.2 °F)   TempSrc:       SpO2: 95% 95% 94% 95%   Weight:       Height:           Last I/O:  No intake/output data recorded.    Physical Exam:  GEN: healthy appearing, appears stated age, NAD  HEENT: NCAT  CV: RRR, no m/r/g, no LE edema  LUNGS: CTAB, no w/r/c  ABD: soft, NT  SKIN: no  rashes  NEURO: A+Ox3, baseline BLE 1-2/5 strength, baseline dysarthria  PSYCH: appropriate mood, affect      Inpatient Medications:  baclofen, 10 mg, oral, q24h  baclofen, 20 mg, oral, BID  diclofenac sodium, 4 g, Topical, TID  enoxaparin, 40 mg, subcutaneous, Daily  gabapentin, 800 mg, oral, q8h ALTON  lamoTRIgine, 400 mg, oral, Nightly  pantoprazole, 40 mg, oral, BID AC   Or  pantoprazole, 40 mg, intravenous, BID AC  polyethylene glycol, 17 g, oral, Daily        PRN Medications  PRN medications: acetaminophen, HYDROmorphone, methocarbamol, naloxone, ondansetron, oxyCODONE-acetaminophen    Continuous Medications:         LABS AND IMAGING:     Labs:  No results found for this or any previous visit (from the past 24 hour(s)).       Imaging:  CT thoracic spine wo IV contrast, CT lumbar spine wo IV contrast, CT chest abdomen pelvis w IV contrast  Narrative: Interpreted By:  Tomas Wesley,   STUDY:  CT CHEST ABDOMEN PELVIS W IV CONTRAST; CT THORACIC SPINE WO IV  CONTRAST; CT LUMBAR SPINE WO IV CONTRAST; ;  11/18/2023 10:16 pm;  11/18/2023 10:17 pm      INDICATION:  Signs/Symptoms:fall, thoracic pain radiating to legs;  Signs/Symptoms:fall, pain.      COMPARISON:  CT abdomen and pelvis of 10/04/2023.      ACCESSION NUMBER(S):  [id number]; [id number]; [id number]      ORDERING CLINICIAN:  CATE RORDIGUEZ      TECHNIQUE:  CT chest, abdomen and pelvis after IV administration of 75 cc  Omnipaque 350. Multiplanar reformations.      Dedicated multiplanar reformations of the thoracic and lumbar spine  were also created and reviewed.      FINDINGS:  CHEST:      VESSELS: Aorta is normal caliber. Atherosclerotic changes in the  aorta and coronary arteries. HEART: Normal size. No pericardial  effusion. MEDIASTINUM AND DAMARI: No pathologically enlarged thoracic  lymph nodes. LUNG, PLEURA, LARGE AIRWAYS: Respiratory motion artifact  slightly limits evaluation of lung parenchyma. 4 mm nodule laterally  in the right upper lobe  (series 204, image 50). 4 mm nodule  posteriorly in the left lung apex (series 204, image 53). Minor  dependent atelectasis. CHEST WALL AND LOWER NECK: Within normal  limits.      ABDOMEN:      BONES: No acute osseous abnormality.  Mild bilateral hip  osteoarthropathy. ABDOMINAL WALL: Within normal limits.      LIVER: Enlarged. No focal lesion or evidence of acute injury.  BILE DUCTS: Normal caliber.  GALLBLADDER: Absent.  PANCREAS: Atrophic, with fatty infiltration.  SPLEEN: Within normal limits.  ADRENALS: Within normal limits.  KIDNEYS: Within normal limits.  URETERS: No hydroureter.      PELVIS:      REPRODUCTIVE ORGANS: An IUD is present in the uterus. Uterus and  adnexae otherwise appear within normal limits. BLADDER: Decompressed,  and not well evaluated. No bladder calculus or obvious mass. No  perivesical inflammatory change.      VESSELS: Within normal limits.  RETROPERITONEUM: Within normal limits.  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  There is a serpiginous tubular tract of hypoattenuation between the  proximal aspects of suture lines of the gastric pouch and excluded  stomach. There is also marked distention of the excluded stomach. A  small locule of air was seen interposed between the excluded stomach  and gastric pouch in this region on the prior exam. 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 Further  evaluation with nonemergent outpatient fluoroscopic upper GI exam is  suggested.  Normal appendix. PERITONEUM: No ascites or free air, no  fluid collection.      Thoracic spine:  ALIGNMENT: Normal.  VERTEBRAE: No acute fracture. Similar minor chronic anterior wedging  and multilevel endplate irregularity in the lower thoracic spine  suggesting sequelae of Scheuermann's osteochondromatosis.  PREVERTEBRAL SOFT TISSUES: Within normal limits.      OTHER FINDINGS: None.  SPINAL CANAL: No critical spinal canal stenosis.      Lumbar  spine:  ALIGNMENT:Stable grade 1 anterolisthesis at L5-S1. Alignment is  otherwise maintained. VERTEBRAE: No acute fracture. Vertebral stature  is maintained. Moderate to severe discogenic and facet degeneration  at L5-S1. Moderate facet osteoarthropathy at L4-L5. PREVERTEBRAL SOFT  TISSUES: Within normal limits.      Sacrum appears intact.      OTHER FINDINGS: None.  SPINAL CANAL: No critical spinal canal stenosis.      Impression: No evidence of acute abnormality in the chest, abdomen or pelvis.      No acute thoracic or lumbar spine fracture or malalignment.      Hepatomegaly.      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 There is  a serpiginous tubular tract of hypoattenuation between the proximal  aspects of suture lines of the gastric pouch and excluded stomach.  There is also marked distention of the excluded stomach. A small  locule of air was seen interposed between the excluded stomach and  gastric pouch in this region on the prior exam. 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 Further  evaluation with nonemergent outpatient fluoroscopic upper GI exam is  suggested. A yellow alert was sent.      IUD noted in the uterus.      Incidental 4 mm nodules in the apical lungs, probably  postinflammatory in etiology given their small size and patient age.  Suggest attention on follow-up.      Additional findings as discussed above.      MACRO:  Critical Finding:  See findings. Notification was initiated on  11/18/2023 at 10:38 pm by  Tomas Wesley.  (**-[facility]-**) Instructions:      Signed by: Tomas Wesley 11/18/2023 10:40 PM  Dictation workstation:   [id number]

## 2023-11-20 NOTE — PROGRESS NOTES
Pt prefers a home w/ [facility] discharge. Pt given cms list from [facility] for agency selection. Pd tomorrow

## 2023-11-20 NOTE — CARE PLAN
The patient's goals for the shift include      The clinical goals for the shift include decrease in pain

## 2023-11-20 NOTE — CONSULTS
"Reason For Consult  Severe back pain status post bilateral L4-5 L5-S1 facet block with good result    History Of Present Illness  Berta East is a 23 y.o. female presenting with severe back pain.  She has a history of Matt ataxia and have been having chronic back pain for over 10 years.  CT scan shows bilateral L4-5 L5-S1 facet hypertrophy.  She has successful facet injection with 80% pain relief of result's test.  Per her and her mother, they are having hard time scheduling her for RFA of the medial branch.  Her last medial branch/facet injection was in July 2023.  I discussed with her the possibility of providing her with RFA of medial branches bilaterally.     Past Medical History  She has a history of Dhruv ataxia and not ambulating for long time.  History of degenerative disc disease with spondylosis.    Surgical History  She has no past surgical history on file.     Social History  She reports that she has never smoked. She has never used smokeless tobacco. She reports that she does not drink alcohol and does not use drugs.    Family History  No family history on file.     Allergies  Patient has no known allergies.    Review of Systems  No history of fever or chills.  No history of GI symptoms.  No chest pain no asthma no abdominal pain     Physical Exam  The patient is alert conscious x3.  HEENT within normal limits  Heart S1-S2 no S3  Bilateral breath sounds  Abdominal exam within normal limit.  Neurological examination: Cranial nerves II through XII within normal limit  Motor and sensory of upper extremities within normal limits.  Lower extremities severe weakness 3/5 strength.     Last Recorded Vitals  Blood pressure 99/56, pulse 74, temperature 36.2 °C (97.2 °F), resp. rate 16, height 1.753 m (5' 9.02\"), weight 83.9 kg (185 lb), SpO2 95 %.    Relevant Results  CT scan of lumbar spine shows severe level 5 S1 spondylosis bilaterally with facet hypertrophy and moderate L4-5 bilateral facet " hypertrophy     Assessment/Plan     I discussed with the patient and her mother the plan of care ranging from conservative treatment to interventional pain procedure in the form of bilateral L4-5 L5-S1 RFA.    I spent 45 minutes in the professional and overall care of this patient.      Nighat Pang MD

## 2023-11-20 NOTE — PROGRESS NOTES
Physical Therapy    Physical Therapy Treatment    Patient Name: Berta East  MRN: [id number]  Today's Date: 11/20/2023  Time Calculation  Start Time: 1411  Stop Time: 1445  Time Calculation (min): 34 min       Assessment/Plan   PT Assessment  PT Assessment Results: Decreased mobility, Decreased strength  Rehab Prognosis: Good  Evaluation/Treatment Tolerance: Patient tolerated treatment well  Medical Staff Made Aware: Yes  Barriers to Participation:  (pt reports too much activity causes her to regress in ability to perform transfers / mobility .)  End of Session Communication: Bedside nurse  Assessment Comment: pt would benefit from continued therapy to improved functional mobilityand safety    Treatment/Interventions: Therapeutic activity, Bed mobility  PT Plan: Skilled PT  PT Frequency: 2 times per week  PT Discharge Recommendations: Low intensity level of continued care    PT Recommended Transfer Status: Assist x2    General Visit Information:   PT  Visit  PT Received On: 11/20/23  General  Reason for Referral: impaired mobility  Family/Caregiver Present: No  Prior to Session Communication: Bedside nurse (cleared by nursing to participate)  Patient Position Received: Bed, 3 rail up  General Comment: agreeable to particpate, shoes applied prior to treatment per pt's request (states that her mother does stand pivot transfers with her , blocking her feet and knees)    General Observations:   General Observation: pt supine with B LE suupported on pillows, pt's LE abd with knees bent    Subjective     Precautions:  Precautions  Medical Precautions: Fall precautions  Braces Applied: Pt. statest that she has B foot braces/shoes that she has to wear where transferring or up on her feet; pt. abducts from hips through ankles without braces. (Shoes donned prior to treatment )  Vital Signs:     Objective     Pain:  Pain Assessment  Pain Assessment: 0-10  Pain Score: 8  Pain Type: Acute pain  Pain Location: Back  Pain  Descriptors: Aching  Pain Frequency: Intermittent  Pain Interventions: Cold applied (pain increased to 9/10 with activity)    Cognition:  Cognition  Overall Cognitive Status: Within Functional Limits          Activity Tolerance:  Activity Tolerance  Endurance: Decreased tolerance for upright activites    Treatments:  Therapeutic Exercise  Therapeutic Exercise Activity 1: pt states that she has been doing outpatient therapy and exercises but not as much lately due to increased pain , also stating that she has to make sure to strengthen without increasing fatigue and causing weakness  Therapeutic Activity  Therapeutic Activity Performed: Yes (pt tolerated sitting EOB for 25 min interval with B LE 's blocked , feet tend to ER , feet blocked with PTA's feet , also required pressure through B quads to maintain LE's on floor , pt using B UE to support her trunk , able to reposition with Mod A)     Bed Mobility  Bed Mobility: Yes  Bed Mobility 1  Bed Mobility Comments 1: transfers supine --> sit with Min A ,sit --> supine with ModA , pt requests that PTA support LE's and then able to scoot to edge , required assist to advance LE's , able to move trunk and UE  Bed Mobility 2  Bed Mobility Comments 2: rolling with min A and use of bed rails                Outcome Measures:  [facility] Basic Mobility  Turning from your back to your side while in a flat bed without using bedrails: A lot  Moving from lying on your back to sitting on the side of a flat bed without using bedrails: A lot  Moving to and from bed to chair (including a wheelchair): Total  Standing up from a chair using your arms (e.g. wheelchair or bedside chair): Total  To walk in hospital room: Total  Climbing 3-5 steps with railing: Total  Basic Mobility - Total Score: 8  Education Documentation  Mobility Training, taught by Grecia Jewell PTA at 11/20/2023  3:43 PM.  Learner: Patient  Readiness: Acceptance  Method: Explanation  Response: Verbalizes Understanding,  Needs Reinforcement    Education Comments  No comments found.        EDUCATION:       Encounter Problems       Encounter Problems (Active)       PT Problem       Pt will demonstrate mod A x 1 with bed mobility to edge of bed.   (Progressing)       Start:  11/19/23    Expected End:  12/03/23            Pt will demonstrate  slideboard transfers with mod A x 1 to WC .   (Not Progressing)       Start:  11/19/23    Expected End:  12/03/23            pt to demo sitting balance of fair to aide in slideboard transfers .  (Not Progressing)       Start:  11/19/23    Expected End:  12/03/23               Pain - Adult

## 2023-11-21 ENCOUNTER — ANESTHESIA (OUTPATIENT)
Dept: OPERATING ROOM | Facility: HOSPITAL | Age: 23
DRG: 552 | End: 2023-11-21
Payer: MEDICARE

## 2023-11-21 ENCOUNTER — APPOINTMENT (OUTPATIENT)
Dept: RADIOLOGY | Facility: HOSPITAL | Age: 23
DRG: 552 | End: 2023-11-21
Payer: MEDICARE

## 2023-11-21 ENCOUNTER — ANESTHESIA EVENT (OUTPATIENT)
Dept: OPERATING ROOM | Facility: HOSPITAL | Age: 23
DRG: 552 | End: 2023-11-21
Payer: MEDICARE

## 2023-11-21 LAB
ERYTHROCYTE [DISTWIDTH] IN BLOOD BY AUTOMATED COUNT: 13.4 % (ref 11.5–14.5)
HCT VFR BLD AUTO: 35.9 % (ref 36–46)
HGB BLD-MCNC: 11.6 G/DL (ref 12–16)
HOLD SPECIMEN: NORMAL
HOLD SPECIMEN: NORMAL
INR PPP: 1 (ref 0.9–1.1)
MCH RBC QN AUTO: 29.2 PG (ref 26–34)
MCHC RBC AUTO-ENTMCNC: 32.3 G/DL (ref 32–36)
MCV RBC AUTO: 90 FL (ref 80–100)
NRBC BLD-RTO: 0 /100 WBCS (ref 0–0)
PLATELET # BLD AUTO: 194 X10*3/UL (ref 150–450)
PROTHROMBIN TIME: 10.8 SECONDS (ref 9.8–12.8)
RBC # BLD AUTO: 3.97 X10*6/UL (ref 4–5.2)
WBC # BLD AUTO: 5.8 X10*3/UL (ref 4.4–11.3)

## 2023-11-21 PROCEDURE — 85027 COMPLETE CBC AUTOMATED: CPT | Performed by: INTERNAL MEDICINE

## 2023-11-21 PROCEDURE — 3600000007 HC OR TIME - EACH INCREMENTAL 1 MINUTE - PROCEDURE LEVEL TWO: Performed by: ANESTHESIOLOGY

## 2023-11-21 PROCEDURE — 3E0T3BZ INTRODUCTION OF ANESTHETIC AGENT INTO PERIPHERAL NERVES AND PLEXI, PERCUTANEOUS APPROACH: ICD-10-PCS | Performed by: ANESTHESIOLOGY

## 2023-11-21 PROCEDURE — 2500000004 HC RX 250 GENERAL PHARMACY W/ HCPCS (ALT 636 FOR OP/ED): Performed by: ANESTHESIOLOGY

## 2023-11-21 PROCEDURE — 96372 THER/PROPH/DIAG INJ SC/IM: CPT | Performed by: ANESTHESIOLOGY

## 2023-11-21 PROCEDURE — 99232 SBSQ HOSP IP/OBS MODERATE 35: CPT | Performed by: STUDENT IN AN ORGANIZED HEALTH CARE EDUCATION/TRAINING PROGRAM

## 2023-11-21 PROCEDURE — 85610 PROTHROMBIN TIME: CPT | Performed by: INTERNAL MEDICINE

## 2023-11-21 PROCEDURE — 2500000001 HC RX 250 WO HCPCS SELF ADMINISTERED DRUGS (ALT 637 FOR MEDICARE OP): Performed by: NURSE PRACTITIONER

## 2023-11-21 PROCEDURE — 36415 COLL VENOUS BLD VENIPUNCTURE: CPT | Performed by: INTERNAL MEDICINE

## 2023-11-21 PROCEDURE — 76000 FLUOROSCOPY <1 HR PHYS/QHP: CPT

## 2023-11-21 PROCEDURE — 3E0T33Z INTRODUCTION OF ANTI-INFLAMMATORY INTO PERIPHERAL NERVES AND PLEXI, PERCUTANEOUS APPROACH: ICD-10-PCS | Performed by: ANESTHESIOLOGY

## 2023-11-21 PROCEDURE — 2550000001 HC RX 255 CONTRASTS: Performed by: ANESTHESIOLOGY

## 2023-11-21 PROCEDURE — 2500000004 HC RX 250 GENERAL PHARMACY W/ HCPCS (ALT 636 FOR OP/ED): Performed by: INTERNAL MEDICINE

## 2023-11-21 PROCEDURE — 1210000001 HC SEMI-PRIVATE ROOM DAILY

## 2023-11-21 PROCEDURE — 2720000007 HC OR 272 NO HCPCS: Performed by: ANESTHESIOLOGY

## 2023-11-21 PROCEDURE — 3600000002 HC OR TIME - INITIAL BASE CHARGE - PROCEDURE LEVEL TWO: Performed by: ANESTHESIOLOGY

## 2023-11-21 PROCEDURE — 2500000001 HC RX 250 WO HCPCS SELF ADMINISTERED DRUGS (ALT 637 FOR MEDICARE OP): Performed by: INTERNAL MEDICINE

## 2023-11-21 PROCEDURE — 2500000005 HC RX 250 GENERAL PHARMACY W/O HCPCS: Performed by: ANESTHESIOLOGY

## 2023-11-21 RX ORDER — HYDROXYZINE HYDROCHLORIDE 25 MG/1
50 TABLET, FILM COATED ORAL ONCE
Status: COMPLETED | OUTPATIENT
Start: 2023-11-21 | End: 2023-11-21

## 2023-11-21 RX ORDER — LORAZEPAM 0.5 MG/1
0.5 TABLET ORAL ONCE
Status: COMPLETED | OUTPATIENT
Start: 2023-11-21 | End: 2023-11-21

## 2023-11-21 RX ADMIN — HYDROMORPHONE HYDROCHLORIDE 0.4 MG: 1 INJECTION, SOLUTION INTRAMUSCULAR; INTRAVENOUS; SUBCUTANEOUS at 20:24

## 2023-11-21 RX ADMIN — BACLOFEN 20 MG: 10 TABLET ORAL at 08:53

## 2023-11-21 RX ADMIN — HYDROMORPHONE HYDROCHLORIDE 0.4 MG: 1 INJECTION, SOLUTION INTRAMUSCULAR; INTRAVENOUS; SUBCUTANEOUS at 14:39

## 2023-11-21 RX ADMIN — OXYCODONE HYDROCHLORIDE AND ACETAMINOPHEN 2 TABLET: 5; 325 TABLET ORAL at 11:27

## 2023-11-21 RX ADMIN — OXYCODONE HYDROCHLORIDE AND ACETAMINOPHEN 2 TABLET: 5; 325 TABLET ORAL at 17:53

## 2023-11-21 RX ADMIN — LAMOTRIGINE 400 MG: 100 TABLET ORAL at 21:41

## 2023-11-21 RX ADMIN — HYDROMORPHONE HYDROCHLORIDE 0.4 MG: 1 INJECTION, SOLUTION INTRAMUSCULAR; INTRAVENOUS; SUBCUTANEOUS at 01:34

## 2023-11-21 RX ADMIN — BACLOFEN 10 MG: 10 TABLET ORAL at 14:38

## 2023-11-21 RX ADMIN — BACLOFEN 20 MG: 10 TABLET ORAL at 21:42

## 2023-11-21 RX ADMIN — GABAPENTIN 800 MG: 400 CAPSULE ORAL at 21:41

## 2023-11-21 RX ADMIN — DICLOFENAC SODIUM 1 APPLICATION: 10 GEL TOPICAL at 08:56

## 2023-11-21 RX ADMIN — POLYETHYLENE GLYCOL 3350 17 G: 17 POWDER, FOR SOLUTION ORAL at 08:54

## 2023-11-21 RX ADMIN — METHOCARBAMOL TABLETS 500 MG: 500 TABLET, COATED ORAL at 17:53

## 2023-11-21 RX ADMIN — PANTOPRAZOLE SODIUM 40 MG: 40 TABLET, DELAYED RELEASE ORAL at 05:38

## 2023-11-21 RX ADMIN — PANTOPRAZOLE SODIUM 40 MG: 40 TABLET, DELAYED RELEASE ORAL at 16:25

## 2023-11-21 RX ADMIN — ONDANSETRON 4 MG: 2 INJECTION INTRAMUSCULAR; INTRAVENOUS at 11:27

## 2023-11-21 RX ADMIN — LORAZEPAM 0.5 MG: 0.5 TABLET ORAL at 02:54

## 2023-11-21 RX ADMIN — HYDROXYZINE HYDROCHLORIDE 50 MG: 25 TABLET ORAL at 01:34

## 2023-11-21 RX ADMIN — DICLOFENAC SODIUM 1 APPLICATION: 10 GEL TOPICAL at 15:30

## 2023-11-21 RX ADMIN — OXYCODONE HYDROCHLORIDE AND ACETAMINOPHEN 2 TABLET: 5; 325 TABLET ORAL at 05:38

## 2023-11-21 RX ADMIN — HYDROMORPHONE HYDROCHLORIDE 0.4 MG: 1 INJECTION, SOLUTION INTRAMUSCULAR; INTRAVENOUS; SUBCUTANEOUS at 08:53

## 2023-11-21 RX ADMIN — GABAPENTIN 800 MG: 400 CAPSULE ORAL at 14:38

## 2023-11-21 RX ADMIN — DICLOFENAC SODIUM 1 APPLICATION: 10 GEL TOPICAL at 21:42

## 2023-11-21 RX ADMIN — GABAPENTIN 800 MG: 400 CAPSULE ORAL at 05:38

## 2023-11-21 ASSESSMENT — COGNITIVE AND FUNCTIONAL STATUS - GENERAL
DRESSING REGULAR UPPER BODY CLOTHING: A LITTLE
PERSONAL GROOMING: A LITTLE
STANDING UP FROM CHAIR USING ARMS: TOTAL
DRESSING REGULAR LOWER BODY CLOTHING: TOTAL
MOBILITY SCORE: 8
HELP NEEDED FOR BATHING: A LOT
MOVING FROM LYING ON BACK TO SITTING ON SIDE OF FLAT BED WITH BEDRAILS: A LOT
MOVING TO AND FROM BED TO CHAIR: TOTAL
CLIMB 3 TO 5 STEPS WITH RAILING: TOTAL
TURNING FROM BACK TO SIDE WHILE IN FLAT BAD: A LOT
WALKING IN HOSPITAL ROOM: TOTAL
TOILETING: A LOT
DAILY ACTIVITIY SCORE: 15

## 2023-11-21 ASSESSMENT — PAIN - FUNCTIONAL ASSESSMENT
PAIN_FUNCTIONAL_ASSESSMENT: 0-10

## 2023-11-21 ASSESSMENT — PAIN SCALES - GENERAL
PAINLEVEL_OUTOF10: 0 - NO PAIN
PAINLEVEL_OUTOF10: 9
PAINLEVEL_OUTOF10: 8
PAINLEVEL_OUTOF10: 9

## 2023-11-21 ASSESSMENT — PAIN DESCRIPTION - DESCRIPTORS: DESCRIPTORS: ACHING

## 2023-11-21 ASSESSMENT — PAIN DESCRIPTION - LOCATION
LOCATION: BACK
LOCATION: BACK

## 2023-11-21 NOTE — PROGRESS NOTES
Occupational Therapy                 Therapy Communication Note    Patient Name: Berta East  MRN: [id number]  Today's Date: 11/21/2023     Discipline: Occupational Therapy    Missed Visit Reason: Missed Visit Reason:  (pt refused OT tx, stating she is in too much pain [place] [facility] [place] stating her whole body hurts. pt mother present. max encouragement provided however pt continued to refuse)    Missed Time: Attempt @ 1035    Comment:

## 2023-11-21 NOTE — PROCEDURES
Procedure:   Bilateral L3, 4, 5 medial branches radiofrequency ablations under fluoroscopy    Preoperative diagnosis   L4-5 L5-S1 severe spondylosis    Postoperative diagnosis   Same as preop    Procedure :  L3, 4, 5 medial branches radiofrequency ablations under fluoroscopy    Anesthesia   Local    History     Ms. East is a pleasant 23 years old lady.  She has history of bilateral L4-5, L5-S1 spondylosis.  She had facet/medial branch block twice in each side which showed 80% pain relief.  I was able to verify the last injection which happened in July, 2023 and it shows 80% pain relief.  The patient was admitted because of severe back pain and is a family and the patient were upset because they could not schedule RFA of the medial branch.  Since the giving her another block will be very very short-term and she will need radiofrequency ablation and based on the patient and the family requests, I agree with the patient.  The patient has history of Matt ataxia and is immobile and transportation is bit concern for her and for her family.  For convenience of the patient general condition and the family I offered her radiofrequency ablation of bilateral L3, 4 and 5 medial branches and they agreed to proceed.    Procedure description     The patient was taken to the operating room and placed in prone position.  Timeout was obtained as well as heart health.  Basic ASA monitors were applied.  And all pressure areas were protected.  The skin of the lower back was prepped and draped using DuraPrep.  Under fluoroscopic guidance, the site of right L3, L4, and L5 medial branches were identified.  It was located at that eye of the Micheal dog shape.  Infiltration of the skin and subcutaneous tissue with 1% buffered lidocaine was performed at each level.  Under fluoroscopic guidance with oblique view, the radiofrequency needle was advanced at at the level of right L3 until it reached to the desired location.  The procedure was  then repeated to right L4 and L5 medial branches.  Location was confirmed with contrast Omnipaque which shows no intrathecal or intravascular extension.  Testing for sensory at 0.25 and motor at 2 showed good placement.  Injection of a mixture of 0.5% ropivacaine mixed with epinephrine 1 and 200,000 and dexamethasone in an amount of 1-1/2 cc at each level.  Lesioning started on the level of L3 with an impedance of 372 L4 impedance of 272 and L5 impedance of 263.  Lesioning was performed at 80 °C and 90 seconds each.  The procedure was repeated on the left side.  The needle was withdrawn intact and the Band-Aids were applied.  Blood loss     Minimal  Complications    None and the patient tolerated the procedure very well.  She stated that her pain is much better after then procedure.  Final disposition [    The patient return it to her nursing floor in a stable condition.

## 2023-11-22 PROCEDURE — 96372 THER/PROPH/DIAG INJ SC/IM: CPT | Performed by: INTERNAL MEDICINE

## 2023-11-22 PROCEDURE — 2500000004 HC RX 250 GENERAL PHARMACY W/ HCPCS (ALT 636 FOR OP/ED): Performed by: INTERNAL MEDICINE

## 2023-11-22 PROCEDURE — 2500000001 HC RX 250 WO HCPCS SELF ADMINISTERED DRUGS (ALT 637 FOR MEDICARE OP): Performed by: ANESTHESIOLOGY

## 2023-11-22 PROCEDURE — 2500000001 HC RX 250 WO HCPCS SELF ADMINISTERED DRUGS (ALT 637 FOR MEDICARE OP): Performed by: INTERNAL MEDICINE

## 2023-11-22 PROCEDURE — 97530 THERAPEUTIC ACTIVITIES: CPT | Mod: GO,CO

## 2023-11-22 PROCEDURE — 2720000007 HC OR 272 NO HCPCS

## 2023-11-22 PROCEDURE — 99232 SBSQ HOSP IP/OBS MODERATE 35: CPT | Performed by: STUDENT IN AN ORGANIZED HEALTH CARE EDUCATION/TRAINING PROGRAM

## 2023-11-22 PROCEDURE — 1210000001 HC SEMI-PRIVATE ROOM DAILY

## 2023-11-22 PROCEDURE — 97110 THERAPEUTIC EXERCISES: CPT | Mod: GO,CO

## 2023-11-22 PROCEDURE — 2500000004 HC RX 250 GENERAL PHARMACY W/ HCPCS (ALT 636 FOR OP/ED): Performed by: ANESTHESIOLOGY

## 2023-11-22 PROCEDURE — 2500000002 HC RX 250 W HCPCS SELF ADMINISTERED DRUGS (ALT 637 FOR MEDICARE OP, ALT 636 FOR OP/ED): Performed by: STUDENT IN AN ORGANIZED HEALTH CARE EDUCATION/TRAINING PROGRAM

## 2023-11-22 RX ORDER — LIDOCAINE HYDROCHLORIDE 10 MG/ML
5 INJECTION INFILTRATION; PERINEURAL ONCE
Status: DISCONTINUED | OUTPATIENT
Start: 2023-11-22 | End: 2023-11-28

## 2023-11-22 RX ORDER — OXYCODONE HYDROCHLORIDE 5 MG/1
10 TABLET ORAL EVERY 4 HOURS PRN
Status: DISCONTINUED | OUTPATIENT
Start: 2023-11-22 | End: 2023-11-28

## 2023-11-22 RX ORDER — ALBUTEROL SULFATE 90 UG/1
2 AEROSOL, METERED RESPIRATORY (INHALATION) EVERY 6 HOURS PRN
Status: DISCONTINUED | OUTPATIENT
Start: 2023-11-22 | End: 2023-12-01 | Stop reason: HOSPADM

## 2023-11-22 RX ADMIN — LAMOTRIGINE 400 MG: 100 TABLET ORAL at 20:47

## 2023-11-22 RX ADMIN — OXYCODONE HYDROCHLORIDE 10 MG: 5 TABLET ORAL at 23:59

## 2023-11-22 RX ADMIN — HYDROMORPHONE HYDROCHLORIDE 0.4 MG: 1 INJECTION, SOLUTION INTRAMUSCULAR; INTRAVENOUS; SUBCUTANEOUS at 22:50

## 2023-11-22 RX ADMIN — DICLOFENAC SODIUM 1 APPLICATION: 10 GEL TOPICAL at 21:00

## 2023-11-22 RX ADMIN — METHOCARBAMOL TABLETS 500 MG: 500 TABLET, COATED ORAL at 20:44

## 2023-11-22 RX ADMIN — METHYLPREDNISOLONE SODIUM SUCCINATE 40 MG: 40 INJECTION, POWDER, FOR SOLUTION INTRAMUSCULAR; INTRAVENOUS at 14:37

## 2023-11-22 RX ADMIN — METHOCARBAMOL TABLETS 500 MG: 500 TABLET, COATED ORAL at 06:43

## 2023-11-22 RX ADMIN — PANTOPRAZOLE SODIUM 40 MG: 40 TABLET, DELAYED RELEASE ORAL at 05:41

## 2023-11-22 RX ADMIN — HYDROMORPHONE HYDROCHLORIDE 0.4 MG: 1 INJECTION, SOLUTION INTRAMUSCULAR; INTRAVENOUS; SUBCUTANEOUS at 16:54

## 2023-11-22 RX ADMIN — POLYETHYLENE GLYCOL 3350 17 G: 17 POWDER, FOR SOLUTION ORAL at 09:49

## 2023-11-22 RX ADMIN — HYDROMORPHONE HYDROCHLORIDE 0.4 MG: 1 INJECTION, SOLUTION INTRAMUSCULAR; INTRAVENOUS; SUBCUTANEOUS at 02:57

## 2023-11-22 RX ADMIN — GABAPENTIN 800 MG: 400 CAPSULE ORAL at 22:37

## 2023-11-22 RX ADMIN — OXYCODONE HYDROCHLORIDE 10 MG: 5 TABLET ORAL at 14:38

## 2023-11-22 RX ADMIN — BACLOFEN 20 MG: 10 TABLET ORAL at 09:48

## 2023-11-22 RX ADMIN — OXYCODONE HYDROCHLORIDE AND ACETAMINOPHEN 2 TABLET: 5; 325 TABLET ORAL at 09:51

## 2023-11-22 RX ADMIN — HYDROMORPHONE HYDROCHLORIDE 0.4 MG: 1 INJECTION, SOLUTION INTRAMUSCULAR; INTRAVENOUS; SUBCUTANEOUS at 12:28

## 2023-11-22 RX ADMIN — ONDANSETRON 4 MG: 2 INJECTION INTRAMUSCULAR; INTRAVENOUS at 14:45

## 2023-11-22 RX ADMIN — PANTOPRAZOLE SODIUM 40 MG: 40 TABLET, DELAYED RELEASE ORAL at 16:13

## 2023-11-22 RX ADMIN — OXYCODONE HYDROCHLORIDE 10 MG: 5 TABLET ORAL at 19:25

## 2023-11-22 RX ADMIN — OXYCODONE HYDROCHLORIDE AND ACETAMINOPHEN 2 TABLET: 5; 325 TABLET ORAL at 04:44

## 2023-11-22 RX ADMIN — GABAPENTIN 800 MG: 400 CAPSULE ORAL at 14:37

## 2023-11-22 RX ADMIN — ALBUTEROL SULFATE 2 PUFF: 90 AEROSOL, METERED RESPIRATORY (INHALATION) at 22:55

## 2023-11-22 RX ADMIN — BACLOFEN 10 MG: 10 TABLET ORAL at 14:38

## 2023-11-22 RX ADMIN — ONDANSETRON 4 MG: 2 INJECTION INTRAMUSCULAR; INTRAVENOUS at 19:25

## 2023-11-22 RX ADMIN — HYDROMORPHONE HYDROCHLORIDE 0.4 MG: 1 INJECTION, SOLUTION INTRAMUSCULAR; INTRAVENOUS; SUBCUTANEOUS at 06:42

## 2023-11-22 RX ADMIN — DICLOFENAC SODIUM 1 APPLICATION: 10 GEL TOPICAL at 09:49

## 2023-11-22 RX ADMIN — ENOXAPARIN SODIUM 40 MG: 40 INJECTION SUBCUTANEOUS at 20:49

## 2023-11-22 RX ADMIN — GABAPENTIN 800 MG: 400 CAPSULE ORAL at 05:41

## 2023-11-22 RX ADMIN — BACLOFEN 20 MG: 10 TABLET ORAL at 20:49

## 2023-11-22 RX ADMIN — ONDANSETRON 4 MG: 2 INJECTION INTRAMUSCULAR; INTRAVENOUS at 23:59

## 2023-11-22 ASSESSMENT — PAIN SCALES - GENERAL
PAINLEVEL_OUTOF10: 8
PAINLEVEL_OUTOF10: 9
PAINLEVEL_OUTOF10: 10 - WORST POSSIBLE PAIN
PAINLEVEL_OUTOF10: 10 - WORST POSSIBLE PAIN
PAINLEVEL_OUTOF10: 9
PAINLEVEL_OUTOF10: 8
PAINLEVEL_OUTOF10: 9
PAINLEVEL_OUTOF10: 9
PAINLEVEL_OUTOF10: 10 - WORST POSSIBLE PAIN

## 2023-11-22 ASSESSMENT — COGNITIVE AND FUNCTIONAL STATUS - GENERAL
DAILY ACTIVITIY SCORE: 13
HELP NEEDED FOR BATHING: A LOT
PERSONAL GROOMING: A LITTLE
PERSONAL GROOMING: A LITTLE
WALKING IN HOSPITAL ROOM: TOTAL
MOVING TO AND FROM BED TO CHAIR: A LOT
STANDING UP FROM CHAIR USING ARMS: A LOT
DRESSING REGULAR LOWER BODY CLOTHING: TOTAL
DAILY ACTIVITIY SCORE: 14
MOVING FROM LYING ON BACK TO SITTING ON SIDE OF FLAT BED WITH BEDRAILS: A LITTLE
HELP NEEDED FOR BATHING: A LOT
TOILETING: A LOT
DAILY ACTIVITIY SCORE: 14
DRESSING REGULAR UPPER BODY CLOTHING: A LOT
MOVING FROM LYING ON BACK TO SITTING ON SIDE OF FLAT BED WITH BEDRAILS: A LOT
CLIMB 3 TO 5 STEPS WITH RAILING: TOTAL
MOVING TO AND FROM BED TO CHAIR: A LOT
TURNING FROM BACK TO SIDE WHILE IN FLAT BAD: A LOT
MOBILITY SCORE: 11
DRESSING REGULAR UPPER BODY CLOTHING: A LOT
PERSONAL GROOMING: A LOT
DRESSING REGULAR LOWER BODY CLOTHING: TOTAL
TURNING FROM BACK TO SIDE WHILE IN FLAT BAD: A LOT
HELP NEEDED FOR BATHING: A LOT
STANDING UP FROM CHAIR USING ARMS: A LOT
CLIMB 3 TO 5 STEPS WITH RAILING: TOTAL
TOILETING: A LOT
MOBILITY SCORE: 10
DRESSING REGULAR UPPER BODY CLOTHING: A LOT
DRESSING REGULAR LOWER BODY CLOTHING: TOTAL
TOILETING: A LOT
WALKING IN HOSPITAL ROOM: TOTAL

## 2023-11-22 ASSESSMENT — PAIN DESCRIPTION - DESCRIPTORS: DESCRIPTORS: ACHING;BURNING;PINS AND NEEDLES

## 2023-11-22 ASSESSMENT — PAIN - FUNCTIONAL ASSESSMENT
PAIN_FUNCTIONAL_ASSESSMENT: 0-10

## 2023-11-22 ASSESSMENT — PAIN DESCRIPTION - ORIENTATION
ORIENTATION: RIGHT
ORIENTATION: LOWER

## 2023-11-22 ASSESSMENT — PAIN DESCRIPTION - LOCATION
LOCATION: KNEE
LOCATION: BACK
LOCATION: BACK

## 2023-11-22 NOTE — PROGRESS NOTES
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  - has chronic issues with low back pain 2/2 lumbar radiculopathy and muscle spasms, has known lumbar spondylosis with documented L4/L5 disc protrusion  - home regimen is Percocet 10mg QID, baclofen 20/10/20, gabapentin 600mg tid, and lamotrigine 400mg at bedtime  - follows with pain  and PM+R for steroid injections/nerve blocks  - she is supposed to see [facility] physician for possible nerve ablation as outpt  - baseline wheelchair bound  - CT C/A/P, CT T/L spine neg for acute findings this admission  - no red flag symptoms to warrant rpt MRI at this time  Plan:  - cont home Percocet Q6H PRN, with IV Dilaudid added for breakthrough, PRN Narcan ordered, daily Miralax  - cont with increased gabapentin dose of 800mg TID, cont home Lamictal at bedtime for neuropathic pain  - cont home Baclofen, add Robaxin as well for breakthrough muscle spasms  - topical voltaren gel  - will avoid systemic NSAIDs and steroids due to history of gastric pouch ulceration  - PT/OT  - pain mgmt consulted, per their note, possible interventional pain procedure to be done     Vte ppx lovenox    11/21/23  - Patient still in persistent pain. With little relief with pain meds.   - requesting ativan which was given overnight.  Will hold off.   - Scheduled for RFA of bilateral L3, 4 , 5 branches. Will assess for success  - appreciate pain mgt recs.   - On home pain regimen.          Joyce Schwarz MD    SUBJECTIVE     Seen and assessed.  Going for RFA.  Uncomfortable but asking for ativan and not pain meds right now.      OBJECTIVE:     Last Recorded Vitals:  Vitals:    11/21/23 0830 11/21/23 1419 11/21/23 1507 11/21/23 2050   BP: 103/61 107/53 117/71 118/59   BP Location: Left arm  Left arm    Patient Position: Lying  Lying    Pulse: 82 66 85 67   Resp: 20 13 16 18   Temp: 35.9 °C  (96.6 °F) 36.7 °C (98.1 °F) 36.7 °C (98.1 °F) 35.7 °C (96.3 °F)   TempSrc: Temporal  Temporal    SpO2: 96% 97% 98% 96%   Weight:       Height:         Last I/O:  I/O last 3 completed shifts:  In: 120 (1.4 mL/kg) [P.O.:120]  Out: - (0 mL/kg)   Weight: 83.9 kg     Physical Exam    GEN: healthy appearing, appears stated age, NAD  HEENT: NCAT  CV: RRR, no m/r/g, no LE edema  LUNGS: CTAB, no w/r/c  ABD: soft, NT  SKIN: no rashes  NEURO: A+Ox3, baseline BLE 1-2/5 strength, baseline dysarthria  PSYCH: appropriate mood, affect    Inpatient Medications:  baclofen, 10 mg, oral, q24h  baclofen, 20 mg, oral, BID  diclofenac sodium, 4 g, Topical, TID  enoxaparin, 40 mg, subcutaneous, Daily  gabapentin, 800 mg, oral, q8h ALTON  lamoTRIgine, 400 mg, oral, Nightly  pantoprazole, 40 mg, oral, BID AC   Or  pantoprazole, 40 mg, intravenous, BID AC  polyethylene glycol, 17 g, oral, Daily    PRN Medications  PRN medications: acetaminophen, HYDROmorphone, methocarbamol, naloxone, ondansetron, oxyCODONE-acetaminophen  Continuous Medications:     LABS AND IMAGING:     Labs:  Results from last 7 days   Lab Units 11/21/23  0539 11/19/23  0531 11/18/23  1841   WBC AUTO x10*3/uL 5.8 6.5 5.9   RBC AUTO x10*6/uL 3.97* 3.99* 4.75   HEMOGLOBIN g/dL 11.6* 11.6* 14.1   HEMATOCRIT % 35.9* 35.0* 43.8   MCV fL 90 88 92   MCH pg 29.2 29.1 29.7   MCHC g/dL 32.3 33.1 32.2   RDW % 13.4 13.2 13.4   PLATELETS AUTO x10*3/uL 194 204 179     Results from last 7 days   Lab Units 11/19/23  0531 11/18/23  1938 11/18/23  1841   SODIUM mmol/L 141  --  140   POTASSIUM mmol/L 3.9 3.8 5.4*   CHLORIDE mmol/L 106  --  107   CO2 mmol/L 27  --  26   BUN mg/dL 10  --  6   CREATININE mg/dL 0.54  --  0.53   GLUCOSE mg/dL 124*  --  101*   PROTEIN TOTAL g/dL 5.8*  --  6.9   CALCIUM mg/dL 8.6  --  9.1   BILIRUBIN TOTAL mg/dL 0.3  --  0.4   ALK PHOS U/L 42  --  43   AST U/L 16  --  39   ALT U/L 19  --  22     Results from last 7 days   Lab Units 11/18/23  1841   MAGNESIUM mg/dL  1.88         Imaging:  FL less than 1 hour  These images are not reportable by radiology and will not be interpreted   by  Radiologists.

## 2023-11-22 NOTE — CARE PLAN
The patient's goals for the shift include pain control     The clinical goals for the shift include Pain control

## 2023-11-22 NOTE — PROGRESS NOTES
"Berta East is a 23 y.o. female on day 3 of admission presenting with Back pain due to injury.    Subjective      She is status post bilateral L3, L4 and L5 radiofrequency ablation of the medial branch.  Patient stated that she feels pins-and-needles in her back but no radiculopathy.    Objective   Follow-up on the procedure  Physical Exam  Vitals and nursing note reviewed. Exam conducted with a chaperone present.       Patient is alert x 3  Bilateral breath sounds  Heart S1-S2 no S3    Last Recorded Vitals  Blood pressure 137/58, pulse 84, temperature 36.5 °C (97.7 °F), temperature source Temporal, resp. rate 18, height 1.753 m (5' 9.02\"), weight 83.9 kg (185 lb), SpO2 99 %.  Intake/Output last 3 Shifts:  I/O last 3 completed shifts:  In: 120 (1.4 mL/kg) [P.O.:120]  Out: - (0 mL/kg)   Weight: 83.9 kg     Relevant Results       I would like to change oxycodone/acetaminophen to oxycodone 10 mg every 4 hours as needed pain to give a chance for the acetaminophen for breakthrough pain.  Also I will add 3 days of parenteral steroids for the acute inflammatory changes post radiofrequency ablation.                  Assessment/Plan      23 years old with history of Matt ataxia, severe lumbar spinal spondylosis status post RFA.  I had a long discussion with her and her mother regarding the procedure and expectation.  I made some changes in her medications.    I spent 25 minutes in the professional and overall care of this patient.      Nighat Pang MD      "

## 2023-11-22 NOTE — PROGRESS NOTES
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  - has chronic issues with low back pain 2/2 lumbar radiculopathy and muscle spasms, has known lumbar spondylosis with documented L4/L5 disc protrusion  - home regimen is Percocet 10mg QID, baclofen 20/10/20, gabapentin 600mg tid, and lamotrigine 400mg at bedtime  - follows with pain  and PM+R for steroid injections/nerve blocks  - she is supposed to see [facility] physician for possible nerve ablation as outpt  - baseline wheelchair bound  - CT C/A/P, CT T/L spine neg for acute findings this admission  - no red flag symptoms to warrant rpt MRI at this time  Plan:  - cont home Percocet Q6H PRN, with IV Dilaudid added for breakthrough, PRN Narcan ordered, daily Miralax  - cont with increased gabapentin dose of 800mg TID, cont home Lamictal at bedtime for neuropathic pain  - cont home Baclofen, add Robaxin as well for breakthrough muscle spasms  - topical voltaren gel  - will avoid systemic NSAIDs and steroids due to history of gastric pouch ulceration  - PT/OT  - pain mgmt consulted, per their note, possible interventional pain procedure to be done     Vte ppx lovenox     11/21/23  - Patient still in persistent pain. With little relief with pain meds.   - requesting ativan which was given overnight.  Will hold off.   - Scheduled for RFA of bilateral L3, 4 , 5 branches. Will assess for success  - appreciate pain mgt recs.   - On home pain regimen.     11/22/23  -  Patient is status post bilateral L3, L4 and L5 RFA medial branch.  -  Pain management continuing to monitor on have switch patient from oxycodone/acetaminophen to pure oxycodone 10 mg every 4 hours.  -  Steroids were also added to his regimen due to acute inflammatory effect post RFA  - continue to monitor  - Will send for basic labs tomorrow.  -  Continue pain regimen      Joyce  ELIZABETH Schwarz MD    SUBJECTIVE      patient seen and assessed during rounds.  Patient still reporting significant pain.  She reports that she has been getting Ativan at night at her facility.  However nothing is showing up on [facility] report.    OBJECTIVE:     Last Recorded Vitals:  Vitals:    11/21/23 1507 11/21/23 2050 11/22/23 0319 11/22/23 0822   BP: 117/71 118/59 115/65 137/58   BP Location: Left arm      Patient Position: Lying      Pulse: 85 67 67 84   Resp: 16 18 18 18   Temp: 36.7 °C (98.1 °F) 35.7 °C (96.3 °F) 36 °C (96.8 °F) 36.5 °C (97.7 °F)   TempSrc: Temporal      SpO2: 98% 96% 97% 99%   Weight:       Height:         Last I/O:  I/O last 3 completed shifts:  In: 120 (1.4 mL/kg) [P.O.:120]  Out: - (0 mL/kg)   Weight: 83.9 kg     Physical Exam    GEN: healthy appearing, appears stated age, NAD  HEENT: NCAT  CV: RRR, no m/r/g, no LE edema  LUNGS: CTAB, no w/r/c  ABD: soft, NT  SKIN: no rashes  NEURO: A+Ox3, baseline BLE 1-2/5 strength, baseline dysarthria  PSYCH: appropriate mood, affect    Inpatient Medications:  baclofen, 10 mg, oral, q24h  baclofen, 20 mg, oral, BID  diclofenac sodium, 4 g, Topical, TID  enoxaparin, 40 mg, subcutaneous, Daily  gabapentin, 800 mg, oral, q8h ALTON  lamoTRIgine, 400 mg, oral, Nightly  pantoprazole, 40 mg, oral, BID AC   Or  pantoprazole, 40 mg, intravenous, BID AC  polyethylene glycol, 17 g, oral, Daily    PRN Medications  PRN medications: acetaminophen, HYDROmorphone, methocarbamol, naloxone, ondansetron, oxyCODONE-acetaminophen  Continuous Medications:     LABS AND IMAGING:     Labs:  Results from last 7 days   Lab Units 11/21/23  0539 11/19/23  0531 11/18/23  1841   WBC AUTO x10*3/uL 5.8 6.5 5.9   RBC AUTO x10*6/uL 3.97* 3.99* 4.75   HEMOGLOBIN g/dL 11.6* 11.6* 14.1   HEMATOCRIT % 35.9* 35.0* 43.8   MCV fL 90 88 92   MCH pg 29.2 29.1 29.7   MCHC g/dL 32.3 33.1 32.2   RDW % 13.4 13.2 13.4   PLATELETS AUTO x10*3/uL 194 204 179     Results from last 7 days   Lab Units 11/19/23  0531  11/18/23 1938 11/18/23  1841   SODIUM mmol/L 141  --  140   POTASSIUM mmol/L 3.9 3.8 5.4*   CHLORIDE mmol/L 106  --  107   CO2 mmol/L 27  --  26   BUN mg/dL 10  --  6   CREATININE mg/dL 0.54  --  0.53   GLUCOSE mg/dL 124*  --  101*   PROTEIN TOTAL g/dL 5.8*  --  6.9   CALCIUM mg/dL 8.6  --  9.1   BILIRUBIN TOTAL mg/dL 0.3  --  0.4   ALK PHOS U/L 42  --  43   AST U/L 16  --  39   ALT U/L 19  --  22     Results from last 7 days   Lab Units 11/18/23  1841   MAGNESIUM mg/dL 1.88         Imaging:  FL less than 1 hour  These images are not reportable by radiology and will not be interpreted   by  Radiologists.

## 2023-11-22 NOTE — PROGRESS NOTES
Occupational Therapy    OT Treatment    Patient Name: Berta East  MRN: [id number]  Today's Date: 11/22/2023  Time Calculation  Start Time: 1352  Stop Time: 1416  Time Calculation (min): 24 min       Assessment:  End of Session Communication: Bedside nurse  End of Session Patient Position: Bed, 2 rail up, Alarm off, not on at start of session     Plan:  Treatment Interventions: ADL retraining, Endurance training, Compensatory technique education, Patient/family training  OT Frequency: 2 times per week  Treatment Interventions: ADL retraining, Endurance training, Compensatory technique education, Patient/family training    Subjective   Previous Visit Info:  OT Last Visit  OT Received On: 11/22/23  General:  General  Prior to Session Communication: Bedside nurse  Patient Position Received: Bed, 3 rail up  Precautions:  Medical Precautions: Fall precautions     Pain:  Pain Assessment  Pain Assessment: 0-10  Pain Score: 9  Pain Type: Chronic pain  Pain Location: Back    Objective       Activities of Daily Living: LE Dressing  LE Dressing: Yes  Shoe Level of Assistance: Dependent (donned and doffed)  LE Dressing Where Assessed: Bed level  Functional Standing Tolerance:     Bed Mobility/Transfers: Bed Mobility  Bed Mobility: Yes  Bed Mobility 1  Bed Mobility 1: Supine to sitting  Level of Assistance 1: Moderate assistance (for BLE)  Bed Mobility 2  Bed Mobility  2: Sitting to supine  Level of Assistance 2: Moderate assistance (for BLE)  Bed Mobility 3  Bed Mobility 3: Scooting  Level of Assistance 3: Maximum assistance    Sitting Balance:  Static Sitting Balance  Static Sitting-Level of Assistance:  (fair+ balance with 2 ue support)  Dynamic Sitting Balance  Dynamic Sitting-Balance:  (fair/fair- balance)     Therapy/Activity: Therapeutic Exercise  Therapeutic Exercise Performed: Yes  Therapeutic Exercise Activity 1: supine/sitting up in bed, patient performed theraband HEP with use of yellow threraband with moderate  resistance: shoulder flex, shoulder abd/add, bicep, tricep x10    Therapeutic Activity  Therapeutic Activity 1: patient sat EOB for a total of 6 mins this date with fair/fair- balance with 2-1 ue support    Outcome Measures:[facility] Daily Activity  Putting on and taking off regular lower body clothing: Total  Bathing (including washing, rinsing, drying): A lot  Putting on and taking off regular upper body clothing: A lot  Toileting, which includes using toilet, bedpan or urinal: A lot  Taking care of personal grooming such as brushing teeth: A little  Eating Meals: None  Daily Activity - Total Score: 14    Education Documentation  Home Exercise Program, taught by SADIA Ramos at 11/22/2023  3:09 PM.  Learner: Mother, Patient  Readiness: Acceptance  Method: Explanation  Response: Needs Reinforcement    ADL Training, taught by SADIA Ramos at 11/22/2023  3:09 PM.  Learner: Mother, Patient  Readiness: Acceptance  Method: Explanation  Response: Needs Reinforcement    Education Comments  No comments found.      OP EDUCATION:       Goals:  Encounter Problems       Encounter Problems (Active)       OT Goals       Min A for slide board transfers bed<>BSC<>W/C (Progressing)       Start:  11/19/23    Expected End:  12/03/23            Mod I for carryover of BUE therapeutic exercise HEP for carryover of slide board transfers and assist with ADLs (Progressing)       Start:  11/19/23    Expected End:  12/03/23            Fair + dyn sitting EOB for x10 minutes ADL participation.  (Progressing)       Start:  11/19/23    Expected End:  12/03/23

## 2023-11-22 NOTE — PROGRESS NOTES
Pt agreeable to [facility]. Pt has their contact info.  Pd tomorrow. Dr downs aware of need for orders and agency of choice

## 2023-11-23 PROCEDURE — 2500000001 HC RX 250 WO HCPCS SELF ADMINISTERED DRUGS (ALT 637 FOR MEDICARE OP): Performed by: HOSPITALIST

## 2023-11-23 PROCEDURE — 2500000001 HC RX 250 WO HCPCS SELF ADMINISTERED DRUGS (ALT 637 FOR MEDICARE OP): Performed by: ANESTHESIOLOGY

## 2023-11-23 PROCEDURE — 2500000001 HC RX 250 WO HCPCS SELF ADMINISTERED DRUGS (ALT 637 FOR MEDICARE OP): Performed by: STUDENT IN AN ORGANIZED HEALTH CARE EDUCATION/TRAINING PROGRAM

## 2023-11-23 PROCEDURE — 2500000001 HC RX 250 WO HCPCS SELF ADMINISTERED DRUGS (ALT 637 FOR MEDICARE OP): Performed by: INTERNAL MEDICINE

## 2023-11-23 PROCEDURE — 2500000004 HC RX 250 GENERAL PHARMACY W/ HCPCS (ALT 636 FOR OP/ED): Performed by: ANESTHESIOLOGY

## 2023-11-23 PROCEDURE — 2500000004 HC RX 250 GENERAL PHARMACY W/ HCPCS (ALT 636 FOR OP/ED): Performed by: INTERNAL MEDICINE

## 2023-11-23 PROCEDURE — 2500000004 HC RX 250 GENERAL PHARMACY W/ HCPCS (ALT 636 FOR OP/ED): Performed by: HOSPITALIST

## 2023-11-23 PROCEDURE — 1210000001 HC SEMI-PRIVATE ROOM DAILY

## 2023-11-23 PROCEDURE — 96372 THER/PROPH/DIAG INJ SC/IM: CPT | Performed by: INTERNAL MEDICINE

## 2023-11-23 PROCEDURE — 99232 SBSQ HOSP IP/OBS MODERATE 35: CPT | Performed by: STUDENT IN AN ORGANIZED HEALTH CARE EDUCATION/TRAINING PROGRAM

## 2023-11-23 RX ORDER — SUCRALFATE 1 G/10ML
1 SUSPENSION ORAL 4 TIMES DAILY
Status: DISCONTINUED | OUTPATIENT
Start: 2023-11-23 | End: 2023-11-23

## 2023-11-23 RX ORDER — NYSTATIN 100000 [USP'U]/G
1 POWDER TOPICAL 2 TIMES DAILY
Status: DISCONTINUED | OUTPATIENT
Start: 2023-11-23 | End: 2023-12-01 | Stop reason: HOSPADM

## 2023-11-23 RX ORDER — OMEPRAZOLE 20 MG/1
40 CAPSULE, DELAYED RELEASE ORAL
Status: DISCONTINUED | OUTPATIENT
Start: 2023-11-23 | End: 2023-12-01 | Stop reason: HOSPADM

## 2023-11-23 RX ORDER — SUCRALFATE 1 G/1
1 TABLET ORAL
Status: DISCONTINUED | OUTPATIENT
Start: 2023-11-23 | End: 2023-12-01 | Stop reason: HOSPADM

## 2023-11-23 RX ADMIN — DICLOFENAC SODIUM 1 APPLICATION: 10 GEL TOPICAL at 15:00

## 2023-11-23 RX ADMIN — ENOXAPARIN SODIUM 40 MG: 40 INJECTION SUBCUTANEOUS at 20:15

## 2023-11-23 RX ADMIN — METHYLPREDNISOLONE SODIUM SUCCINATE 20 MG: 40 INJECTION, POWDER, FOR SOLUTION INTRAMUSCULAR; INTRAVENOUS at 10:10

## 2023-11-23 RX ADMIN — DICLOFENAC SODIUM 1 APPLICATION: 10 GEL TOPICAL at 20:30

## 2023-11-23 RX ADMIN — DICLOFENAC SODIUM 1 APPLICATION: 10 GEL TOPICAL at 09:00

## 2023-11-23 RX ADMIN — GABAPENTIN 800 MG: 400 CAPSULE ORAL at 13:36

## 2023-11-23 RX ADMIN — HYDROMORPHONE HYDROCHLORIDE 0.4 MG: 1 INJECTION, SOLUTION INTRAMUSCULAR; INTRAVENOUS; SUBCUTANEOUS at 10:12

## 2023-11-23 RX ADMIN — ONDANSETRON 4 MG: 2 INJECTION INTRAMUSCULAR; INTRAVENOUS at 20:18

## 2023-11-23 RX ADMIN — BACLOFEN 20 MG: 10 TABLET ORAL at 10:11

## 2023-11-23 RX ADMIN — SUCRALFATE 1 G: 1 TABLET ORAL at 15:30

## 2023-11-23 RX ADMIN — HYDROMORPHONE HYDROCHLORIDE 0.4 MG: 1 INJECTION, SOLUTION INTRAMUSCULAR; INTRAVENOUS; SUBCUTANEOUS at 15:36

## 2023-11-23 RX ADMIN — LAMOTRIGINE 400 MG: 100 TABLET ORAL at 20:13

## 2023-11-23 RX ADMIN — ALBUTEROL SULFATE 2 PUFF: 90 AEROSOL, METERED RESPIRATORY (INHALATION) at 03:30

## 2023-11-23 RX ADMIN — SUCRALFATE 1 G: 1 TABLET ORAL at 20:15

## 2023-11-23 RX ADMIN — OXYCODONE HYDROCHLORIDE 10 MG: 5 TABLET ORAL at 13:36

## 2023-11-23 RX ADMIN — OXYCODONE HYDROCHLORIDE 10 MG: 5 TABLET ORAL at 17:49

## 2023-11-23 RX ADMIN — OXYCODONE HYDROCHLORIDE 10 MG: 5 TABLET ORAL at 05:51

## 2023-11-23 RX ADMIN — BACLOFEN 20 MG: 10 TABLET ORAL at 20:14

## 2023-11-23 RX ADMIN — NYSTATIN 1 APPLICATION: 100000 POWDER TOPICAL at 20:34

## 2023-11-23 RX ADMIN — GABAPENTIN 800 MG: 400 CAPSULE ORAL at 05:49

## 2023-11-23 RX ADMIN — OMEPRAZOLE 40 MG: 20 CAPSULE, DELAYED RELEASE ORAL at 15:30

## 2023-11-23 RX ADMIN — OXYCODONE HYDROCHLORIDE 10 MG: 5 TABLET ORAL at 21:57

## 2023-11-23 RX ADMIN — HYDROMORPHONE HYDROCHLORIDE 0.4 MG: 1 INJECTION, SOLUTION INTRAMUSCULAR; INTRAVENOUS; SUBCUTANEOUS at 20:15

## 2023-11-23 RX ADMIN — METHOCARBAMOL TABLETS 500 MG: 500 TABLET, COATED ORAL at 15:30

## 2023-11-23 RX ADMIN — ONDANSETRON 4 MG: 2 INJECTION INTRAMUSCULAR; INTRAVENOUS at 14:00

## 2023-11-23 RX ADMIN — POLYETHYLENE GLYCOL 3350 17 G: 17 POWDER, FOR SOLUTION ORAL at 10:10

## 2023-11-23 RX ADMIN — PANTOPRAZOLE SODIUM 40 MG: 40 TABLET, DELAYED RELEASE ORAL at 05:49

## 2023-11-23 RX ADMIN — BACLOFEN 10 MG: 10 TABLET ORAL at 14:04

## 2023-11-23 RX ADMIN — HYDROMORPHONE HYDROCHLORIDE 0.4 MG: 1 INJECTION, SOLUTION INTRAMUSCULAR; INTRAVENOUS; SUBCUTANEOUS at 02:14

## 2023-11-23 RX ADMIN — NYSTATIN 1 APPLICATION: 100000 POWDER TOPICAL at 15:29

## 2023-11-23 RX ADMIN — GABAPENTIN 800 MG: 400 CAPSULE ORAL at 21:57

## 2023-11-23 ASSESSMENT — COGNITIVE AND FUNCTIONAL STATUS - GENERAL
DAILY ACTIVITIY SCORE: 14
MOVING TO AND FROM BED TO CHAIR: A LOT
DRESSING REGULAR UPPER BODY CLOTHING: A LOT
DRESSING REGULAR LOWER BODY CLOTHING: TOTAL
PERSONAL GROOMING: A LITTLE
HELP NEEDED FOR BATHING: A LOT
TURNING FROM BACK TO SIDE WHILE IN FLAT BAD: A LOT
TOILETING: A LOT
STANDING UP FROM CHAIR USING ARMS: A LOT
WALKING IN HOSPITAL ROOM: TOTAL
MOBILITY SCORE: 11
CLIMB 3 TO 5 STEPS WITH RAILING: TOTAL
MOVING FROM LYING ON BACK TO SITTING ON SIDE OF FLAT BED WITH BEDRAILS: A LITTLE

## 2023-11-23 ASSESSMENT — PAIN - FUNCTIONAL ASSESSMENT
PAIN_FUNCTIONAL_ASSESSMENT: 0-10

## 2023-11-23 ASSESSMENT — PAIN DESCRIPTION - LOCATION
LOCATION: BACK

## 2023-11-23 ASSESSMENT — PAIN SCALES - GENERAL
PAINLEVEL_OUTOF10: 8
PAINLEVEL_OUTOF10: 8
PAINLEVEL_OUTOF10: 5 - MODERATE PAIN
PAINLEVEL_OUTOF10: 9
PAINLEVEL_OUTOF10: 8
PAINLEVEL_OUTOF10: 4
PAINLEVEL_OUTOF10: 4
PAINLEVEL_OUTOF10: 7

## 2023-11-23 NOTE — CARE PLAN
The patient's goals for the shift include      The clinical goals for the shift include Pain control

## 2023-11-23 NOTE — PROGRESS NOTES
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  - has chronic issues with low back pain 2/2 lumbar radiculopathy and muscle spasms, has known lumbar spondylosis with documented L4/L5 disc protrusion  - home regimen is Percocet 10mg QID, baclofen 20/10/20, gabapentin 600mg tid, and lamotrigine 400mg at bedtime  - follows with pain  and PM+R for steroid injections/nerve blocks  - she is supposed to see [facility] physician for possible nerve ablation as outpt  - baseline wheelchair bound  - CT C/A/P, CT T/L spine neg for acute findings this admission  - no red flag symptoms to warrant rpt MRI at this time  Plan:  - cont home Percocet Q6H PRN, with IV Dilaudid added for breakthrough, PRN Narcan ordered, daily Miralax  - cont with increased gabapentin dose of 800mg TID, cont home Lamictal at bedtime for neuropathic pain  - cont home Baclofen, add Robaxin as well for breakthrough muscle spasms  - topical voltaren gel  - will avoid systemic NSAIDs and steroids due to history of gastric pouch ulceration  - PT/OT  - pain mgmt consulted, per their note, possible interventional pain procedure to be done     Vte ppx lovenox     11/21/23  - Patient still in persistent pain. With little relief with pain meds.   - requesting ativan which was given overnight.  Will hold off.   - Scheduled for RFA of bilateral L3, 4 , 5 branches. Will assess for success  - appreciate pain mgt recs.   - On home pain regimen.      11/22/23  -  Patient is status post bilateral L3, L4 and L5 RFA medial branch.  -  Pain management continuing to monitor on have switch patient from oxycodone/acetaminophen to pure oxycodone 10 mg every 4 hours.  -  Steroids were also added to his regimen due to acute inflammatory effect post RFA  - continue to monitor  - Will send for basic labs tomorrow.  -  Continue pain  regimen    11/23/23  -   Patient remains stable.  No acute changes in pain.  -  Patient unsure if there has been any significant improvement at this point in time but reports that she is expecting, improvement over the next day or 2 due to steroids.  -  Will defer rest of care to pain management.      Joyce Schwarz MD    SUBJECTIVE       Patient seen and assessed on rounds.  Patient has no acute complaints.  Patient at baseline    OBJECTIVE:     Last Recorded Vitals:  Vitals:    11/22/23 1457 11/22/23 1949 11/23/23 0003 11/23/23 0813   BP: 122/68 (!) 126/91 123/67 95/51   BP Location:    Right arm   Patient Position:    Lying   Pulse: 80 79 81 58   Resp:  16 16 18   Temp: 36.3 °C (97.3 °F) 36.5 °C (97.7 °F) 35.4 °C (95.7 °F) 36.2 °C (97.2 °F)   TempSrc:    Temporal   SpO2: 96% 98% 98% 94%   Weight:       Height:         Last I/O:  No intake/output data recorded.    Physical Exam    GEN: healthy appearing, appears stated age, NAD  HEENT: NCAT  CV: RRR, no m/r/g, no LE edema  LUNGS: CTAB, no w/r/c  ABD: soft, NT  SKIN: no rashes  NEURO: A+Ox3, baseline BLE 1-2/5 strength, baseline dysarthria  PSYCH: appropriate mood, affect    Inpatient Medications:  baclofen, 10 mg, oral, q24h  baclofen, 20 mg, oral, BID  diclofenac sodium, 4 g, Topical, TID  enoxaparin, 40 mg, subcutaneous, Daily  gabapentin, 800 mg, oral, q8h ALTON  lamoTRIgine, 400 mg, oral, Nightly  lidocaine, 5 mL, infiltration, Once  [START ON 11/24/2023] methylPREDNISolone sodium succinate (PF), 10 mg, intravenous, q24h  nystatin, 1 Application, Topical, BID  omeprazole, 40 mg, oral, BID AC  polyethylene glycol, 17 g, oral, Daily  sucralfate, 1 g, oral, Before meals & nightly    PRN Medications  PRN medications: acetaminophen, albuterol, HYDROmorphone, methocarbamol, naloxone, ondansetron, oxyCODONE  Continuous Medications:     LABS AND IMAGING:     Labs:  Results from last 7 days   Lab Units 11/21/23  0539 11/19/23  0531 11/18/23  1841   WBC AUTO x10*3/uL 5.8  6.5 5.9   RBC AUTO x10*6/uL 3.97* 3.99* 4.75   HEMOGLOBIN g/dL 11.6* 11.6* 14.1   HEMATOCRIT % 35.9* 35.0* 43.8   MCV fL 90 88 92   MCH pg 29.2 29.1 29.7   MCHC g/dL 32.3 33.1 32.2   RDW % 13.4 13.2 13.4   PLATELETS AUTO x10*3/uL 194 204 179     Results from last 7 days   Lab Units 11/19/23  0531 11/18/23  1938 11/18/23  1841   SODIUM mmol/L 141  --  140   POTASSIUM mmol/L 3.9 3.8 5.4*   CHLORIDE mmol/L 106  --  107   CO2 mmol/L 27  --  26   BUN mg/dL 10  --  6   CREATININE mg/dL 0.54  --  0.53   GLUCOSE mg/dL 124*  --  101*   PROTEIN TOTAL g/dL 5.8*  --  6.9   CALCIUM mg/dL 8.6  --  9.1   BILIRUBIN TOTAL mg/dL 0.3  --  0.4   ALK PHOS U/L 42  --  43   AST U/L 16  --  39   ALT U/L 19  --  22     Results from last 7 days   Lab Units 11/18/23  1841   MAGNESIUM mg/dL 1.88         Imaging:  FL less than 1 hour  These images are not reportable by radiology and will not be interpreted   by  Radiologists.

## 2023-11-24 ENCOUNTER — ANESTHESIA (OUTPATIENT)
Dept: INPATIENT UNIT | Facility: HOSPITAL | Age: 23
DRG: 552 | End: 2023-11-24
Payer: MEDICARE

## 2023-11-24 ENCOUNTER — ANESTHESIA EVENT (OUTPATIENT)
Dept: INPATIENT UNIT | Facility: HOSPITAL | Age: 23
DRG: 552 | End: 2023-11-24
Payer: MEDICARE

## 2023-11-24 PROCEDURE — 99232 SBSQ HOSP IP/OBS MODERATE 35: CPT | Performed by: STUDENT IN AN ORGANIZED HEALTH CARE EDUCATION/TRAINING PROGRAM

## 2023-11-24 PROCEDURE — 2500000004 HC RX 250 GENERAL PHARMACY W/ HCPCS (ALT 636 FOR OP/ED): Performed by: ANESTHESIOLOGY

## 2023-11-24 PROCEDURE — 97530 THERAPEUTIC ACTIVITIES: CPT | Mod: GP,CQ | Performed by: PHYSICAL THERAPY ASSISTANT

## 2023-11-24 PROCEDURE — 97535 SELF CARE MNGMENT TRAINING: CPT | Mod: GO,CO

## 2023-11-24 PROCEDURE — 2500000004 HC RX 250 GENERAL PHARMACY W/ HCPCS (ALT 636 FOR OP/ED): Performed by: INTERNAL MEDICINE

## 2023-11-24 PROCEDURE — 2500000001 HC RX 250 WO HCPCS SELF ADMINISTERED DRUGS (ALT 637 FOR MEDICARE OP): Performed by: ANESTHESIOLOGY

## 2023-11-24 PROCEDURE — 2500000001 HC RX 250 WO HCPCS SELF ADMINISTERED DRUGS (ALT 637 FOR MEDICARE OP): Performed by: INTERNAL MEDICINE

## 2023-11-24 PROCEDURE — 2500000004 HC RX 250 GENERAL PHARMACY W/ HCPCS (ALT 636 FOR OP/ED): Performed by: HOSPITALIST

## 2023-11-24 PROCEDURE — 2500000001 HC RX 250 WO HCPCS SELF ADMINISTERED DRUGS (ALT 637 FOR MEDICARE OP): Performed by: HOSPITALIST

## 2023-11-24 PROCEDURE — 96372 THER/PROPH/DIAG INJ SC/IM: CPT | Performed by: INTERNAL MEDICINE

## 2023-11-24 PROCEDURE — 1210000001 HC SEMI-PRIVATE ROOM DAILY

## 2023-11-24 RX ADMIN — GABAPENTIN 800 MG: 400 CAPSULE ORAL at 06:06

## 2023-11-24 RX ADMIN — ONDANSETRON 4 MG: 2 INJECTION INTRAMUSCULAR; INTRAVENOUS at 08:30

## 2023-11-24 RX ADMIN — HYDROMORPHONE HYDROCHLORIDE 0.4 MG: 1 INJECTION, SOLUTION INTRAMUSCULAR; INTRAVENOUS; SUBCUTANEOUS at 15:54

## 2023-11-24 RX ADMIN — ONDANSETRON 4 MG: 2 INJECTION INTRAMUSCULAR; INTRAVENOUS at 21:41

## 2023-11-24 RX ADMIN — OXYCODONE HYDROCHLORIDE 10 MG: 5 TABLET ORAL at 08:39

## 2023-11-24 RX ADMIN — BACLOFEN 20 MG: 10 TABLET ORAL at 08:28

## 2023-11-24 RX ADMIN — HYDROMORPHONE HYDROCHLORIDE 0.4 MG: 1 INJECTION, SOLUTION INTRAMUSCULAR; INTRAVENOUS; SUBCUTANEOUS at 01:02

## 2023-11-24 RX ADMIN — OMEPRAZOLE 40 MG: 20 CAPSULE, DELAYED RELEASE ORAL at 06:06

## 2023-11-24 RX ADMIN — POLYETHYLENE GLYCOL 3350 17 G: 17 POWDER, FOR SOLUTION ORAL at 08:30

## 2023-11-24 RX ADMIN — GABAPENTIN 800 MG: 400 CAPSULE ORAL at 13:04

## 2023-11-24 RX ADMIN — SUCRALFATE 1 G: 1 TABLET ORAL at 11:11

## 2023-11-24 RX ADMIN — OXYCODONE HYDROCHLORIDE 10 MG: 5 TABLET ORAL at 21:41

## 2023-11-24 RX ADMIN — OXYCODONE HYDROCHLORIDE 10 MG: 5 TABLET ORAL at 13:05

## 2023-11-24 RX ADMIN — HYDROMORPHONE HYDROCHLORIDE 0.4 MG: 1 INJECTION, SOLUTION INTRAMUSCULAR; INTRAVENOUS; SUBCUTANEOUS at 19:30

## 2023-11-24 RX ADMIN — ACETAMINOPHEN 650 MG: 325 TABLET ORAL at 08:43

## 2023-11-24 RX ADMIN — OXYCODONE HYDROCHLORIDE 10 MG: 5 TABLET ORAL at 17:33

## 2023-11-24 RX ADMIN — HYDROMORPHONE HYDROCHLORIDE 0.4 MG: 1 INJECTION, SOLUTION INTRAMUSCULAR; INTRAVENOUS; SUBCUTANEOUS at 06:07

## 2023-11-24 RX ADMIN — BACLOFEN 10 MG: 10 TABLET ORAL at 15:47

## 2023-11-24 RX ADMIN — METHYLPREDNISOLONE SODIUM SUCCINATE 10 MG: 40 INJECTION, POWDER, FOR SOLUTION INTRAMUSCULAR; INTRAVENOUS at 08:29

## 2023-11-24 RX ADMIN — ENOXAPARIN SODIUM 40 MG: 40 INJECTION SUBCUTANEOUS at 21:40

## 2023-11-24 RX ADMIN — OXYCODONE HYDROCHLORIDE 10 MG: 5 TABLET ORAL at 03:55

## 2023-11-24 RX ADMIN — OMEPRAZOLE 40 MG: 20 CAPSULE, DELAYED RELEASE ORAL at 15:47

## 2023-11-24 RX ADMIN — HYDROMORPHONE HYDROCHLORIDE 0.4 MG: 1 INJECTION, SOLUTION INTRAMUSCULAR; INTRAVENOUS; SUBCUTANEOUS at 11:04

## 2023-11-24 RX ADMIN — DICLOFENAC SODIUM 1 APPLICATION: 10 GEL TOPICAL at 08:57

## 2023-11-24 RX ADMIN — SUCRALFATE 1 G: 1 TABLET ORAL at 15:47

## 2023-11-24 RX ADMIN — METHOCARBAMOL TABLETS 500 MG: 500 TABLET, COATED ORAL at 13:04

## 2023-11-24 RX ADMIN — SUCRALFATE 1 G: 1 TABLET ORAL at 06:06

## 2023-11-24 ASSESSMENT — ACTIVITIES OF DAILY LIVING (ADL): HOME_MANAGEMENT_TIME_ENTRY: 19

## 2023-11-24 ASSESSMENT — PAIN - FUNCTIONAL ASSESSMENT
PAIN_FUNCTIONAL_ASSESSMENT: 0-10

## 2023-11-24 ASSESSMENT — PAIN SCALES - GENERAL
PAINLEVEL_OUTOF10: 7
PAINLEVEL_OUTOF10: 8
PAINLEVEL_OUTOF10: 7
PAINLEVEL_OUTOF10: 8
PAINLEVEL_OUTOF10: 3
PAINLEVEL_OUTOF10: 8
PAINLEVEL_OUTOF10: 9
PAINLEVEL_OUTOF10: 10 - WORST POSSIBLE PAIN
PAINLEVEL_OUTOF10: 9
PAINLEVEL_OUTOF10: 7
PAINLEVEL_OUTOF10: 8
PAINLEVEL_OUTOF10: 8
PAINLEVEL_OUTOF10: 7

## 2023-11-24 ASSESSMENT — COGNITIVE AND FUNCTIONAL STATUS - GENERAL
DRESSING REGULAR UPPER BODY CLOTHING: A LOT
EATING MEALS: A LITTLE
TOILETING: A LITTLE
WALKING IN HOSPITAL ROOM: TOTAL
DAILY ACTIVITIY SCORE: 15
WALKING IN HOSPITAL ROOM: TOTAL
DRESSING REGULAR LOWER BODY CLOTHING: TOTAL
TOILETING: A LOT
MOVING FROM LYING ON BACK TO SITTING ON SIDE OF FLAT BED WITH BEDRAILS: A LITTLE
MOVING TO AND FROM BED TO CHAIR: A LOT
CLIMB 3 TO 5 STEPS WITH RAILING: TOTAL
MOVING TO AND FROM BED TO CHAIR: A LOT
MOVING FROM LYING ON BACK TO SITTING ON SIDE OF FLAT BED WITH BEDRAILS: A LITTLE
DRESSING REGULAR UPPER BODY CLOTHING: A LITTLE
PERSONAL GROOMING: A LOT
MOBILITY SCORE: 11
TURNING FROM BACK TO SIDE WHILE IN FLAT BAD: A LOT
MOBILITY SCORE: 14
STANDING UP FROM CHAIR USING ARMS: A LOT
HELP NEEDED FOR BATHING: A LOT
PERSONAL GROOMING: A LITTLE
EATING MEALS: A LITTLE
TURNING FROM BACK TO SIDE WHILE IN FLAT BAD: A LOT
DRESSING REGULAR LOWER BODY CLOTHING: A LOT
DAILY ACTIVITIY SCORE: 13
STANDING UP FROM CHAIR USING ARMS: A LOT
HELP NEEDED FOR BATHING: A LOT

## 2023-11-24 ASSESSMENT — PAIN DESCRIPTION - LOCATION
LOCATION: KNEE
LOCATION: BACK
LOCATION: KNEE
LOCATION: BACK
LOCATION: HEAD
LOCATION: BACK
LOCATION: BACK
LOCATION: KNEE
LOCATION: BACK

## 2023-11-24 ASSESSMENT — PAIN DESCRIPTION - ORIENTATION
ORIENTATION: LOWER
ORIENTATION: LOWER

## 2023-11-24 NOTE — PROGRESS NOTES
Physical Therapy    Physical Therapy Treatment    Patient Name: Berta East  MRN: [id number]  Today's Date: 11/24/2023  Time Calculation  Start Time: 0203  Stop Time: 0227  Time Calculation (min): 24 min       Assessment/Plan   PT Assessment  End of Session Communication: Bedside nurse  End of Session Patient Position: Bed, 2 rail up (pt. mother present preparing to assist pt. with bathing/changing clothes)     PT Plan  Treatment/Interventions: Bed mobility, Transfer training, Positioning  PT Plan: Skilled PT  PT Frequency: 2 times per week  PT Discharge Recommendations: Low intensity level of continued care  PT Recommended Transfer Status: Assist x2  PT - OK to Discharge: Yes (once medically /physically able to safely discharge to next level of care.)      General Visit Information:   PT  Visit  PT Received On: 11/24/23  General  Family/Caregiver Present: Yes (Pt. mother present)  Caregiver Feedback:  (Pt. mother demonstrating transfer of pt. from chair to bsc displaying competently)  Co-Treatment: OT  Co-Treatment Reason:  (OT/PT collaborative tx. for pt. safety and to obtain maximal function.)  Prior to Session Communication: Bedside nurse  Patient Position Received: Up in chair    Subjective   Precautions:  Precautions  Precautions Comment:  (falls)    Treatments:    Bed Mobility  Bed Mobility:  (able to use UE's to roll but requires min assist to position BLE's into flexion for log roll with positioning assist. pt. able to sit up in bed with legs extendend supporting self with BUE's with sup.)    Transfer 1  Transfer From 1:  (sit to stand with mod/max x 1 supporting pt. BLE's in neutral position to prevent abduction and assuring feet flat on floor pt. using UE's to assist with push off seat. bsc to bed with stand pivot transfer mod/max x 1, sit to supine with mod x 1,)    Outcome Measures:  [facility] Basic Mobility  Turning from your back to your side while in a flat bed without using bedrails: A  little  Moving from lying on your back to sitting on the side of a flat bed without using bedrails: A lot  Moving to and from bed to chair (including a wheelchair): A lot  Standing up from a chair using your arms (e.g. wheelchair or bedside chair): A lot  To walk in hospital room: Total  Climbing 3-5 steps with railing: Total  Basic Mobility - Total Score: 11    EDUCATION:  Outpatient Education  Individual(s) Educated: Patient, Parent  Education Provided: Fall Risk  Patient Response to Education: Patient/Caregiver Verbalized Understanding of Information, Patient/Caregiver Performed Return Demonstration of Exercises/Activities    Encounter Problems       Encounter Problems (Active)       PT Problem       Pt will demonstrate mod A x 1 with bed mobility to edge of bed.   (Progressing)       Start:  11/19/23    Expected End:  11/25/23            Pt will demonstrate  slideboard transfers with mod A x 1 to WC .   (Progressing)       Start:  11/19/23    Expected End:  11/25/23            pt to demo sitting balance of fair to aide in slideboard transfers .  (Progressing)       Start:  11/19/23    Expected End:  11/25/23               Pain - Adult

## 2023-11-24 NOTE — PROGRESS NOTES
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  - has chronic issues with low back pain 2/2 lumbar radiculopathy and muscle spasms, has known lumbar spondylosis with documented L4/L5 disc protrusion  - home regimen is Percocet 10mg QID, baclofen 20/10/20, gabapentin 600mg tid, and lamotrigine 400mg at bedtime  - follows with pain  and PM+R for steroid injections/nerve blocks  - she is supposed to see [facility] physician for possible nerve ablation as outpt  - baseline wheelchair bound  - CT C/A/P, CT T/L spine neg for acute findings this admission  - no red flag symptoms to warrant rpt MRI at this time  Plan:  - cont home Percocet Q6H PRN, with IV Dilaudid added for breakthrough, PRN Narcan ordered, daily Miralax  - cont with increased gabapentin dose of 800mg TID, cont home Lamictal at bedtime for neuropathic pain  - cont home Baclofen, add Robaxin as well for breakthrough muscle spasms  - topical voltaren gel  - will avoid systemic NSAIDs and steroids due to history of gastric pouch ulceration  - PT/OT  - pain mgmt consulted, per their note, possible interventional pain procedure to be done     Vte ppx lovenox     11/21/23  - Patient still in persistent pain. With little relief with pain meds.   - requesting ativan which was given overnight.  Will hold off.   - Scheduled for RFA of bilateral L3, 4 , 5 branches. Will assess for success  - appreciate pain mgt recs.   - On home pain regimen.      11/22/23  -  Patient is status post bilateral L3, L4 and L5 RFA medial branch.  -  Pain management continuing to monitor on have switch patient from oxycodone/acetaminophen to pure oxycodone 10 mg every 4 hours.  -  Steroids were also added to his regimen due to acute inflammatory effect post RFA  - continue to monitor  - Will send for basic labs tomorrow.  -  Continue pain regimen      11/23/23  -   Patient remains stable.  No acute changes in pain.  -  Patient unsure if there has been any significant improvement at this point in time but reports that she is expecting, improvement over the next day or 2 due to steroids.  -  Will defer rest of care to pain management.     11/24/23  -   Patient remains stable.  Reports that pain particular in the lower back is probably improving slightly.  -  Continuing steroids per pain management regimen.  -  Continue pain medication per pain management  - patient has not had a bowel movement in 3 days.  We will adjust bowel regimen.  -  Rest of care as above    Joyce Schwarz MD    SUBJECTIVE       Patient seen and assessed during rounds.  Patient still complaining of significant pain although mildly improved.  Is concerned that sometimes she falls behind on her pain regimen and pain worsens.  Denies any fevers or chills nausea or vomiting.  No weakness numbness or tingling.    OBJECTIVE:     Last Recorded Vitals:  Vitals:    11/23/23 1931 11/24/23 0059 11/24/23 0743 11/24/23 1455   BP: 130/78 130/84 96/53 122/79   BP Location:       Patient Position:    Sitting   Pulse: 68 72 69 86   Resp:   16 18   Temp: 36 °C (96.8 °F) 36.3 °C (97.3 °F) 36.4 °C (97.5 °F) 36.4 °C (97.5 °F)   TempSrc:       SpO2: 96% 99% 98% 99%   Weight:       Height:         Last I/O:  No intake/output data recorded.    Physical Exam    GEN: healthy appearing, appears stated age, NAD  HEENT: NCAT  CV: RRR, no m/r/g, no LE edema  LUNGS: CTAB, no w/r/c  ABD: soft, NT  SKIN: no rashes  NEURO: A+Ox3, baseline BLE 1-2/5 strength, baseline dysarthria  PSYCH: appropriate mood, affect    Inpatient Medications:  baclofen, 10 mg, oral, q24h  baclofen, 20 mg, oral, BID  diclofenac sodium, 4 g, Topical, TID  enoxaparin, 40 mg, subcutaneous, Daily  gabapentin, 800 mg, oral, q8h ALTON  lamoTRIgine, 400 mg, oral, Nightly  lidocaine, 5 mL, infiltration, Once  nystatin, 1 Application, Topical,  BID  omeprazole, 40 mg, oral, BID AC  polyethylene glycol, 17 g, oral, Daily  sucralfate, 1 g, oral, Before meals & nightly    PRN Medications  PRN medications: acetaminophen, albuterol, HYDROmorphone, methocarbamol, naloxone, ondansetron, oxyCODONE  Continuous Medications:     LABS AND IMAGING:     Labs:  Results from last 7 days   Lab Units 11/21/23  0539 11/19/23  0531 11/18/23  1841   WBC AUTO x10*3/uL 5.8 6.5 5.9   RBC AUTO x10*6/uL 3.97* 3.99* 4.75   HEMOGLOBIN g/dL 11.6* 11.6* 14.1   HEMATOCRIT % 35.9* 35.0* 43.8   MCV fL 90 88 92   MCH pg 29.2 29.1 29.7   MCHC g/dL 32.3 33.1 32.2   RDW % 13.4 13.2 13.4   PLATELETS AUTO x10*3/uL 194 204 179     Results from last 7 days   Lab Units 11/19/23  0531 11/18/23  1938 11/18/23  1841   SODIUM mmol/L 141  --  140   POTASSIUM mmol/L 3.9 3.8 5.4*   CHLORIDE mmol/L 106  --  107   CO2 mmol/L 27  --  26   BUN mg/dL 10  --  6   CREATININE mg/dL 0.54  --  0.53   GLUCOSE mg/dL 124*  --  101*   PROTEIN TOTAL g/dL 5.8*  --  6.9   CALCIUM mg/dL 8.6  --  9.1   BILIRUBIN TOTAL mg/dL 0.3  --  0.4   ALK PHOS U/L 42  --  43   AST U/L 16  --  39   ALT U/L 19  --  22     Results from last 7 days   Lab Units 11/18/23  1841   MAGNESIUM mg/dL 1.88         Imaging:  FL less than 1 hour  These images are not reportable by radiology and will not be interpreted   by  Radiologists.

## 2023-11-24 NOTE — PROGRESS NOTES
"Occupational Therapy    OT Treatment    Patient Name: Berta East  MRN: [id number]  Today's Date: 11/24/2023  Time Calculation  Start Time: 1401  Stop Time: 1430  Time Calculation (min): 29 min       Assessment:  Medical Staff Made Aware: Yes  End of Session Communication: Bedside nurse  End of Session Patient Position: Bed, 2 rail up  Medical Staff Made Aware: Yes  Plan:  Treatment Interventions: ADL retraining, Endurance training, Compensatory technique education, Patient/family training  OT Frequency: 2 times per week  OT Discharge Recommendations: Low intensity level of continued care (24/7 supervision and assistance;)  Treatment Interventions: ADL retraining, Endurance training, Compensatory technique education, Patient/family training    Subjective   Previous Visit Info:  OT Last Visit  OT Received On: 11/24/23  General:  General  Past Medical History Relevant to Rehab: Friedreichs ataxia, Rayo-en-y gastric bypass  Family/Caregiver Present: Yes  Co-Treatment: PT  Prior to Session Communication: Bedside nurse  Patient Position Received: Up in chair  General Comment: patient reports that she \"fell\"landed onto right knee a few days(11/22/23).  right knee painful and slightly swollen  Precautions:  Medical Precautions: Fall precautions     Pain:  Pain Assessment  Pain Assessment: 0-10  Pain Score: 9  Pain Location: Back (right knee)    Objective    Cognition:  Cognition  Overall Cognitive Status: Within Functional Limits  Orientation Level: Oriented X4     Activities of Daily Living:    LE Dressing  LE Dressing: Yes  Pants Level of Assistance:  (pants mgmt witgh weight shifting and mod a sitting on BSC)  Shoe Level of Assistance: Dependent (donned/doffed)  LE Dressing Where Assessed: Bed level    Toileting  Toileting Level of Assistance: Contact guard (sitting wiht weight shifting)  Where Assessed: Bedside commode  Functional Standing Tolerance:  Functional Standing Tolerance Comments: patient stood for a " total of 45 seconds with poor- balance during transfers  Bed Mobility/Transfers: Bed Mobility  Bed Mobility: Yes  Bed Mobility 1  Bed Mobility 1: Sitting to supine  Level of Assistance 1: Moderate assistance  Bed Mobility 2  Bed Mobility  2: Rolling right, Rolling left  Level of Assistance 2: Minimum assistance  Bed Mobility 3  Bed Mobility 3: Scooting  Level of Assistance 3: Maximum assistance        Sitting Balance:  Static Sitting Balance  Static Sitting-Level of Assistance:  (fair+ with 2 ue support)  Dynamic Sitting Balance  Dynamic Sitting-Balance:  (fair/fair- balance)     Therapy/Activity: Therapeutic Activity  Therapeutic Activity Performed: Yes  Therapeutic Activity 1: patient sat EOB for a total of 6 mins this date with fair/fair+ balance with 2-1 ue support     Outcome Measures:[facility] Daily Activity  Putting on and taking off regular lower body clothing: A lot  Bathing (including washing, rinsing, drying): A lot  Putting on and taking off regular upper body clothing: A little  Toileting, which includes using toilet, bedpan or urinal: A little  Taking care of personal grooming such as brushing teeth: A lot  Eating Meals: A little  Daily Activity - Total Score: 15    Education Documentation  Home Exercise Program, taught by SADIA Ramos at 11/24/2023  3:06 PM.  Learner: Patient  Readiness: Acceptance  Method: Explanation  Response: Needs Reinforcement    ADL Training, taught by SADIA Ramos at 11/24/2023  3:06 PM.  Learner: Patient  Readiness: Acceptance  Method: Explanation  Response: Needs Reinforcement    Education Comments  No comments found.      OP EDUCATION:  Education  Individual(s) Educated: Patient  Plan of Care Discussed and Agreed Upon: yes  Patient Response to Education: Patient/Caregiver Verbalized Understanding of Information    Goals:  Encounter Problems       Encounter Problems (Active)       OT Goals       Min A for slide board transfers bed<>BSC<>W/C (Progressing)        Start:  11/19/23    Expected End:  11/25/23            Mod I for carryover of BUE therapeutic exercise HEP for carryover of slide board transfers and assist with ADLs (Progressing)       Start:  11/19/23    Expected End:  11/25/23            Fair + dyn sitting EOB for x10 minutes ADL participation.  (Progressing)       Start:  11/19/23    Expected End:  11/25/23

## 2023-11-25 PROCEDURE — 2500000004 HC RX 250 GENERAL PHARMACY W/ HCPCS (ALT 636 FOR OP/ED): Performed by: INTERNAL MEDICINE

## 2023-11-25 PROCEDURE — 99232 SBSQ HOSP IP/OBS MODERATE 35: CPT | Performed by: STUDENT IN AN ORGANIZED HEALTH CARE EDUCATION/TRAINING PROGRAM

## 2023-11-25 PROCEDURE — 2500000001 HC RX 250 WO HCPCS SELF ADMINISTERED DRUGS (ALT 637 FOR MEDICARE OP): Performed by: INTERNAL MEDICINE

## 2023-11-25 PROCEDURE — 2500000004 HC RX 250 GENERAL PHARMACY W/ HCPCS (ALT 636 FOR OP/ED): Performed by: HOSPITALIST

## 2023-11-25 PROCEDURE — 2500000001 HC RX 250 WO HCPCS SELF ADMINISTERED DRUGS (ALT 637 FOR MEDICARE OP): Performed by: HOSPITALIST

## 2023-11-25 PROCEDURE — 1210000001 HC SEMI-PRIVATE ROOM DAILY

## 2023-11-25 PROCEDURE — 2500000001 HC RX 250 WO HCPCS SELF ADMINISTERED DRUGS (ALT 637 FOR MEDICARE OP): Performed by: ANESTHESIOLOGY

## 2023-11-25 PROCEDURE — 2500000001 HC RX 250 WO HCPCS SELF ADMINISTERED DRUGS (ALT 637 FOR MEDICARE OP): Performed by: STUDENT IN AN ORGANIZED HEALTH CARE EDUCATION/TRAINING PROGRAM

## 2023-11-25 PROCEDURE — 96372 THER/PROPH/DIAG INJ SC/IM: CPT | Performed by: INTERNAL MEDICINE

## 2023-11-25 RX ORDER — DOCUSATE SODIUM 100 MG/1
100 CAPSULE, LIQUID FILLED ORAL 2 TIMES DAILY
Status: DISCONTINUED | OUTPATIENT
Start: 2023-11-25 | End: 2023-12-01 | Stop reason: HOSPADM

## 2023-11-25 RX ADMIN — HYDROMORPHONE HYDROCHLORIDE 0.4 MG: 1 INJECTION, SOLUTION INTRAMUSCULAR; INTRAVENOUS; SUBCUTANEOUS at 00:13

## 2023-11-25 RX ADMIN — SUCRALFATE 1 G: 1 TABLET ORAL at 15:51

## 2023-11-25 RX ADMIN — DOCUSATE SODIUM 100 MG: 100 CAPSULE, LIQUID FILLED ORAL at 21:17

## 2023-11-25 RX ADMIN — NYSTATIN 1 APPLICATION: 100000 POWDER TOPICAL at 08:34

## 2023-11-25 RX ADMIN — OXYCODONE HYDROCHLORIDE 10 MG: 5 TABLET ORAL at 15:46

## 2023-11-25 RX ADMIN — BACLOFEN 20 MG: 10 TABLET ORAL at 21:18

## 2023-11-25 RX ADMIN — OXYCODONE HYDROCHLORIDE 10 MG: 5 TABLET ORAL at 10:21

## 2023-11-25 RX ADMIN — ONDANSETRON 4 MG: 2 INJECTION INTRAMUSCULAR; INTRAVENOUS at 22:46

## 2023-11-25 RX ADMIN — SUCRALFATE 1 G: 1 TABLET ORAL at 10:12

## 2023-11-25 RX ADMIN — BACLOFEN 20 MG: 10 TABLET ORAL at 00:10

## 2023-11-25 RX ADMIN — ONDANSETRON 4 MG: 2 INJECTION INTRAMUSCULAR; INTRAVENOUS at 06:18

## 2023-11-25 RX ADMIN — OXYCODONE HYDROCHLORIDE 10 MG: 5 TABLET ORAL at 06:13

## 2023-11-25 RX ADMIN — GABAPENTIN 800 MG: 400 CAPSULE ORAL at 21:17

## 2023-11-25 RX ADMIN — DICLOFENAC SODIUM 1 APPLICATION: 10 GEL TOPICAL at 15:51

## 2023-11-25 RX ADMIN — SUCRALFATE 1 G: 1 TABLET ORAL at 00:12

## 2023-11-25 RX ADMIN — HYDROMORPHONE HYDROCHLORIDE 0.4 MG: 1 INJECTION, SOLUTION INTRAMUSCULAR; INTRAVENOUS; SUBCUTANEOUS at 08:36

## 2023-11-25 RX ADMIN — OXYCODONE HYDROCHLORIDE 10 MG: 5 TABLET ORAL at 01:27

## 2023-11-25 RX ADMIN — DICLOFENAC SODIUM 1 APPLICATION: 10 GEL TOPICAL at 21:23

## 2023-11-25 RX ADMIN — SUCRALFATE 1 G: 1 TABLET ORAL at 21:16

## 2023-11-25 RX ADMIN — OMEPRAZOLE 40 MG: 20 CAPSULE, DELAYED RELEASE ORAL at 06:12

## 2023-11-25 RX ADMIN — DICLOFENAC SODIUM 1 APPLICATION: 10 GEL TOPICAL at 08:34

## 2023-11-25 RX ADMIN — HYDROMORPHONE HYDROCHLORIDE 0.4 MG: 1 INJECTION, SOLUTION INTRAMUSCULAR; INTRAVENOUS; SUBCUTANEOUS at 22:50

## 2023-11-25 RX ADMIN — OMEPRAZOLE 40 MG: 20 CAPSULE, DELAYED RELEASE ORAL at 15:47

## 2023-11-25 RX ADMIN — POLYETHYLENE GLYCOL 3350 17 G: 17 POWDER, FOR SOLUTION ORAL at 10:11

## 2023-11-25 RX ADMIN — LAMOTRIGINE 400 MG: 100 TABLET ORAL at 21:16

## 2023-11-25 RX ADMIN — ENOXAPARIN SODIUM 40 MG: 40 INJECTION SUBCUTANEOUS at 21:17

## 2023-11-25 RX ADMIN — ONDANSETRON 4 MG: 2 INJECTION INTRAMUSCULAR; INTRAVENOUS at 13:33

## 2023-11-25 RX ADMIN — NYSTATIN 1 APPLICATION: 100000 POWDER TOPICAL at 21:23

## 2023-11-25 RX ADMIN — SUCRALFATE 1 G: 1 TABLET ORAL at 06:12

## 2023-11-25 RX ADMIN — LAMOTRIGINE 400 MG: 100 TABLET ORAL at 00:10

## 2023-11-25 RX ADMIN — HYDROMORPHONE HYDROCHLORIDE 0.4 MG: 1 INJECTION, SOLUTION INTRAMUSCULAR; INTRAVENOUS; SUBCUTANEOUS at 13:30

## 2023-11-25 RX ADMIN — HYDROMORPHONE HYDROCHLORIDE 0.4 MG: 1 INJECTION, SOLUTION INTRAMUSCULAR; INTRAVENOUS; SUBCUTANEOUS at 18:51

## 2023-11-25 RX ADMIN — GABAPENTIN 800 MG: 400 CAPSULE ORAL at 06:12

## 2023-11-25 RX ADMIN — OXYCODONE HYDROCHLORIDE 10 MG: 5 TABLET ORAL at 21:18

## 2023-11-25 RX ADMIN — GABAPENTIN 800 MG: 400 CAPSULE ORAL at 00:11

## 2023-11-25 RX ADMIN — BACLOFEN 20 MG: 10 TABLET ORAL at 08:34

## 2023-11-25 RX ADMIN — GABAPENTIN 800 MG: 400 CAPSULE ORAL at 13:27

## 2023-11-25 RX ADMIN — BACLOFEN 10 MG: 10 TABLET ORAL at 15:51

## 2023-11-25 RX ADMIN — HYDROMORPHONE HYDROCHLORIDE 0.4 MG: 1 INJECTION, SOLUTION INTRAMUSCULAR; INTRAVENOUS; SUBCUTANEOUS at 03:14

## 2023-11-25 ASSESSMENT — PAIN SCALES - GENERAL
PAINLEVEL_OUTOF10: 8
PAINLEVEL_OUTOF10: 8
PAINLEVEL_OUTOF10: 9
PAINLEVEL_OUTOF10: 9
PAINLEVEL_OUTOF10: 7
PAINLEVEL_OUTOF10: 9
PAINLEVEL_OUTOF10: 8

## 2023-11-25 ASSESSMENT — COGNITIVE AND FUNCTIONAL STATUS - GENERAL
PERSONAL GROOMING: A LOT
EATING MEALS: A LITTLE
MOVING FROM LYING ON BACK TO SITTING ON SIDE OF FLAT BED WITH BEDRAILS: A LITTLE
DRESSING REGULAR LOWER BODY CLOTHING: A LOT
HELP NEEDED FOR BATHING: A LOT
MOBILITY SCORE: 14
DAILY ACTIVITIY SCORE: 15
TOILETING: A LITTLE
WALKING IN HOSPITAL ROOM: TOTAL
TURNING FROM BACK TO SIDE WHILE IN FLAT BAD: A LOT
STANDING UP FROM CHAIR USING ARMS: A LOT
DRESSING REGULAR UPPER BODY CLOTHING: A LITTLE
MOVING TO AND FROM BED TO CHAIR: A LOT

## 2023-11-25 ASSESSMENT — PAIN DESCRIPTION - DESCRIPTORS: DESCRIPTORS: SHARP;SORE

## 2023-11-25 ASSESSMENT — PAIN - FUNCTIONAL ASSESSMENT: PAIN_FUNCTIONAL_ASSESSMENT: 0-10

## 2023-11-25 NOTE — PROGRESS NOTES
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  - has chronic issues with low back pain 2/2 lumbar radiculopathy and muscle spasms, has known lumbar spondylosis with documented L4/L5 disc protrusion  - home regimen is Percocet 10mg QID, baclofen 20/10/20, gabapentin 600mg tid, and lamotrigine 400mg at bedtime  - follows with pain  and PM+R for steroid injections/nerve blocks  - she is supposed to see [facility] physician for possible nerve ablation as outpt  - baseline wheelchair bound  - CT C/A/P, CT T/L spine neg for acute findings this admission  - no red flag symptoms to warrant rpt MRI at this time  Plan:  - cont home Percocet Q6H PRN, with IV Dilaudid added for breakthrough, PRN Narcan ordered, daily Miralax  - cont with increased gabapentin dose of 800mg TID, cont home Lamictal at bedtime for neuropathic pain  - cont home Baclofen, add Robaxin as well for breakthrough muscle spasms  - topical voltaren gel  - will avoid systemic NSAIDs and steroids due to history of gastric pouch ulceration  - PT/OT  - pain mgmt consulted, per their note, possible interventional pain procedure to be done     Vte ppx lovenox     11/21/23  - Patient still in persistent pain. With little relief with pain meds.   - requesting ativan which was given overnight.  Will hold off.   - Scheduled for RFA of bilateral L3, 4 , 5 branches. Will assess for success  - appreciate pain mgt recs.   - On home pain regimen.      11/22/23  -  Patient is status post bilateral L3, L4 and L5 RFA medial branch.  -  Pain management continuing to monitor on have switch patient from oxycodone/acetaminophen to pure oxycodone 10 mg every 4 hours.  -  Steroids were also added to his regimen due to acute inflammatory effect post RFA  - continue to monitor  - Will send for basic labs tomorrow.  -  Continue pain regimen      11/23/23  -   Patient remains stable.  No acute changes in pain.  -  Patient unsure if there has been any significant improvement at this point in time but reports that she is expecting, improvement over the next day or 2 due to steroids.  -  Will defer rest of care to pain management.     11/24/23  -   Patient remains stable.  Reports that pain particular in the lower back is probably improving slightly.  -  Continuing steroids per pain management regimen.  -  Continue pain medication per pain management  - patient has not had a bowel movement in 3 days.  We will adjust bowel regimen.  -  Rest of care as above    11/25/23  - patient with no acute events overnight.  Patient reports that she has improved slightly in terms of pain she has.   -  Docusate was added to bowel regimen.  -  Continue with Carafate for her  reflux symptoms  -   Rest of care per pain management      Joyce Schwarz MD    SUBJECTIVE       patient seen and assessed during rounds patient reports that she has been having significant pain in in the lower back area in particular but this is improving slowly.  Denies any fevers or chills nausea or vomiting.    OBJECTIVE:     Last Recorded Vitals:  Vitals:    11/24/23 1455 11/24/23 2102 11/25/23 0059 11/25/23 0856   BP: 122/79 138/80 121/80 98/54   Patient Position: Sitting      Pulse: 86 81 70    Resp: 18 16 18 16   Temp: 36.4 °C (97.5 °F)  36.4 °C (97.5 °F) 36.2 °C (97.2 °F)   TempSrc:       SpO2: 99% 96% 96%    Weight:       Height:         Last I/O:  No intake/output data recorded.    Physical Exam    GEN: healthy appearing, appears stated age, NAD  HEENT: NCAT  CV: RRR, no m/r/g, no LE edema  LUNGS: CTAB, no w/r/c  ABD: soft, NT  SKIN: no rashes  NEURO: A+Ox3, baseline BLE 1-2/5 strength, baseline dysarthria  PSYCH: appropriate mood, affect    Inpatient Medications:  baclofen, 10 mg, oral, q24h  baclofen, 20 mg, oral, BID  diclofenac sodium, 4 g, Topical, TID  enoxaparin, 40 mg, subcutaneous,  Daily  gabapentin, 800 mg, oral, q8h ALTON  lamoTRIgine, 400 mg, oral, Nightly  lidocaine, 5 mL, infiltration, Once  nystatin, 1 Application, Topical, BID  omeprazole, 40 mg, oral, BID AC  polyethylene glycol, 17 g, oral, Daily  sucralfate, 1 g, oral, Before meals & nightly    PRN Medications  PRN medications: acetaminophen, albuterol, HYDROmorphone, methocarbamol, naloxone, ondansetron, oxyCODONE  Continuous Medications:     LABS AND IMAGING:     Labs:  Results from last 7 days   Lab Units 11/21/23  0539 11/19/23  0531 11/18/23  1841   WBC AUTO x10*3/uL 5.8 6.5 5.9   RBC AUTO x10*6/uL 3.97* 3.99* 4.75   HEMOGLOBIN g/dL 11.6* 11.6* 14.1   HEMATOCRIT % 35.9* 35.0* 43.8   MCV fL 90 88 92   MCH pg 29.2 29.1 29.7   MCHC g/dL 32.3 33.1 32.2   RDW % 13.4 13.2 13.4   PLATELETS AUTO x10*3/uL 194 204 179     Results from last 7 days   Lab Units 11/19/23  0531 11/18/23  1938 11/18/23  1841   SODIUM mmol/L 141  --  140   POTASSIUM mmol/L 3.9 3.8 5.4*   CHLORIDE mmol/L 106  --  107   CO2 mmol/L 27  --  26   BUN mg/dL 10  --  6   CREATININE mg/dL 0.54  --  0.53   GLUCOSE mg/dL 124*  --  101*   PROTEIN TOTAL g/dL 5.8*  --  6.9   CALCIUM mg/dL 8.6  --  9.1   BILIRUBIN TOTAL mg/dL 0.3  --  0.4   ALK PHOS U/L 42  --  43   AST U/L 16  --  39   ALT U/L 19  --  22     Results from last 7 days   Lab Units 11/18/23  1841   MAGNESIUM mg/dL 1.88         Imaging:  FL less than 1 hour  These images are not reportable by radiology and will not be interpreted   by  Radiologists.

## 2023-11-26 PROCEDURE — 99232 SBSQ HOSP IP/OBS MODERATE 35: CPT | Performed by: STUDENT IN AN ORGANIZED HEALTH CARE EDUCATION/TRAINING PROGRAM

## 2023-11-26 PROCEDURE — 1210000001 HC SEMI-PRIVATE ROOM DAILY

## 2023-11-26 PROCEDURE — 2500000001 HC RX 250 WO HCPCS SELF ADMINISTERED DRUGS (ALT 637 FOR MEDICARE OP): Performed by: STUDENT IN AN ORGANIZED HEALTH CARE EDUCATION/TRAINING PROGRAM

## 2023-11-26 PROCEDURE — 2500000001 HC RX 250 WO HCPCS SELF ADMINISTERED DRUGS (ALT 637 FOR MEDICARE OP): Performed by: NURSE PRACTITIONER

## 2023-11-26 PROCEDURE — 2500000001 HC RX 250 WO HCPCS SELF ADMINISTERED DRUGS (ALT 637 FOR MEDICARE OP): Performed by: INTERNAL MEDICINE

## 2023-11-26 PROCEDURE — 2500000004 HC RX 250 GENERAL PHARMACY W/ HCPCS (ALT 636 FOR OP/ED): Performed by: HOSPITALIST

## 2023-11-26 PROCEDURE — 2500000004 HC RX 250 GENERAL PHARMACY W/ HCPCS (ALT 636 FOR OP/ED): Performed by: STUDENT IN AN ORGANIZED HEALTH CARE EDUCATION/TRAINING PROGRAM

## 2023-11-26 PROCEDURE — 2500000001 HC RX 250 WO HCPCS SELF ADMINISTERED DRUGS (ALT 637 FOR MEDICARE OP): Performed by: ANESTHESIOLOGY

## 2023-11-26 PROCEDURE — 2500000001 HC RX 250 WO HCPCS SELF ADMINISTERED DRUGS (ALT 637 FOR MEDICARE OP): Performed by: HOSPITALIST

## 2023-11-26 PROCEDURE — 96372 THER/PROPH/DIAG INJ SC/IM: CPT | Performed by: INTERNAL MEDICINE

## 2023-11-26 PROCEDURE — 2500000004 HC RX 250 GENERAL PHARMACY W/ HCPCS (ALT 636 FOR OP/ED): Performed by: INTERNAL MEDICINE

## 2023-11-26 RX ORDER — CALCIUM CARBONATE 200(500)MG
500 TABLET,CHEWABLE ORAL 4 TIMES DAILY PRN
Status: DISCONTINUED | OUTPATIENT
Start: 2023-11-26 | End: 2023-12-01 | Stop reason: HOSPADM

## 2023-11-26 RX ORDER — OXYCODONE HYDROCHLORIDE 5 MG/1
10 TABLET ORAL 2 TIMES DAILY PRN
Status: DISCONTINUED | OUTPATIENT
Start: 2023-11-26 | End: 2023-11-28

## 2023-11-26 RX ADMIN — POLYETHYLENE GLYCOL 3350 17 G: 17 POWDER, FOR SOLUTION ORAL at 08:29

## 2023-11-26 RX ADMIN — GABAPENTIN 800 MG: 400 CAPSULE ORAL at 14:08

## 2023-11-26 RX ADMIN — SUCRALFATE 1 G: 1 TABLET ORAL at 20:36

## 2023-11-26 RX ADMIN — METHOCARBAMOL TABLETS 500 MG: 500 TABLET, COATED ORAL at 20:36

## 2023-11-26 RX ADMIN — SUCRALFATE 1 G: 1 TABLET ORAL at 16:08

## 2023-11-26 RX ADMIN — DICLOFENAC SODIUM 1 APPLICATION: 10 GEL TOPICAL at 20:40

## 2023-11-26 RX ADMIN — GABAPENTIN 800 MG: 400 CAPSULE ORAL at 06:29

## 2023-11-26 RX ADMIN — BACLOFEN 10 MG: 10 TABLET ORAL at 14:08

## 2023-11-26 RX ADMIN — OXYCODONE HYDROCHLORIDE 10 MG: 5 TABLET ORAL at 02:01

## 2023-11-26 RX ADMIN — SUCRALFATE 1 G: 1 TABLET ORAL at 06:29

## 2023-11-26 RX ADMIN — OMEPRAZOLE 40 MG: 20 CAPSULE, DELAYED RELEASE ORAL at 06:29

## 2023-11-26 RX ADMIN — OXYCODONE HYDROCHLORIDE 10 MG: 5 TABLET ORAL at 06:29

## 2023-11-26 RX ADMIN — BACLOFEN 20 MG: 10 TABLET ORAL at 20:35

## 2023-11-26 RX ADMIN — DOCUSATE SODIUM 100 MG: 100 CAPSULE, LIQUID FILLED ORAL at 08:28

## 2023-11-26 RX ADMIN — HYDROMORPHONE HYDROCHLORIDE 0.4 MG: 1 INJECTION, SOLUTION INTRAMUSCULAR; INTRAVENOUS; SUBCUTANEOUS at 03:59

## 2023-11-26 RX ADMIN — ONDANSETRON 4 MG: 2 INJECTION INTRAMUSCULAR; INTRAVENOUS at 12:33

## 2023-11-26 RX ADMIN — LAMOTRIGINE 400 MG: 100 TABLET ORAL at 20:35

## 2023-11-26 RX ADMIN — HYDROMORPHONE HYDROCHLORIDE 0.4 MG: 1 INJECTION, SOLUTION INTRAMUSCULAR; INTRAVENOUS; SUBCUTANEOUS at 08:29

## 2023-11-26 RX ADMIN — ONDANSETRON 4 MG: 2 INJECTION INTRAMUSCULAR; INTRAVENOUS at 22:30

## 2023-11-26 RX ADMIN — OXYCODONE HYDROCHLORIDE 10 MG: 5 TABLET ORAL at 20:37

## 2023-11-26 RX ADMIN — OMEPRAZOLE 40 MG: 20 CAPSULE, DELAYED RELEASE ORAL at 16:08

## 2023-11-26 RX ADMIN — HYDROMORPHONE HYDROCHLORIDE 0.4 MG: 1 INJECTION, SOLUTION INTRAMUSCULAR; INTRAVENOUS; SUBCUTANEOUS at 22:26

## 2023-11-26 RX ADMIN — SUCRALFATE 1 G: 1 TABLET ORAL at 11:20

## 2023-11-26 RX ADMIN — NYSTATIN 1 APPLICATION: 100000 POWDER TOPICAL at 08:38

## 2023-11-26 RX ADMIN — DICLOFENAC SODIUM 1 APPLICATION: 10 GEL TOPICAL at 08:38

## 2023-11-26 RX ADMIN — GABAPENTIN 800 MG: 400 CAPSULE ORAL at 21:32

## 2023-11-26 RX ADMIN — OXYCODONE HYDROCHLORIDE 10 MG: 5 TABLET ORAL at 16:17

## 2023-11-26 RX ADMIN — BACLOFEN 20 MG: 10 TABLET ORAL at 08:28

## 2023-11-26 RX ADMIN — OXYCODONE HYDROCHLORIDE 10 MG: 5 TABLET ORAL at 11:20

## 2023-11-26 RX ADMIN — METHOCARBAMOL TABLETS 500 MG: 500 TABLET, COATED ORAL at 11:20

## 2023-11-26 RX ADMIN — HYDROMORPHONE HYDROCHLORIDE 0.4 MG: 1 INJECTION, SOLUTION INTRAMUSCULAR; INTRAVENOUS; SUBCUTANEOUS at 14:09

## 2023-11-26 RX ADMIN — ENOXAPARIN SODIUM 40 MG: 40 INJECTION SUBCUTANEOUS at 20:36

## 2023-11-26 RX ADMIN — ANTACID TABLETS 500 MG: 500 TABLET, CHEWABLE ORAL at 23:23

## 2023-11-26 ASSESSMENT — COGNITIVE AND FUNCTIONAL STATUS - GENERAL
MOVING FROM LYING ON BACK TO SITTING ON SIDE OF FLAT BED WITH BEDRAILS: A LITTLE
DRESSING REGULAR UPPER BODY CLOTHING: A LITTLE
EATING MEALS: A LITTLE
DAILY ACTIVITIY SCORE: 15
PERSONAL GROOMING: A LOT
MOVING TO AND FROM BED TO CHAIR: A LOT
DRESSING REGULAR LOWER BODY CLOTHING: A LOT
MOBILITY SCORE: 14
WALKING IN HOSPITAL ROOM: TOTAL
HELP NEEDED FOR BATHING: A LOT
TOILETING: A LITTLE
STANDING UP FROM CHAIR USING ARMS: A LOT
TURNING FROM BACK TO SIDE WHILE IN FLAT BAD: A LOT

## 2023-11-26 ASSESSMENT — PAIN SCALES - GENERAL
PAINLEVEL_OUTOF10: 9
PAINLEVEL_OUTOF10: 8
PAINLEVEL_OUTOF10: 9
PAINLEVEL_OUTOF10: 9

## 2023-11-26 ASSESSMENT — PAIN - FUNCTIONAL ASSESSMENT: PAIN_FUNCTIONAL_ASSESSMENT: 0-10

## 2023-11-27 PROCEDURE — 2500000001 HC RX 250 WO HCPCS SELF ADMINISTERED DRUGS (ALT 637 FOR MEDICARE OP): Performed by: ANESTHESIOLOGY

## 2023-11-27 PROCEDURE — 1210000001 HC SEMI-PRIVATE ROOM DAILY

## 2023-11-27 PROCEDURE — 96372 THER/PROPH/DIAG INJ SC/IM: CPT | Performed by: INTERNAL MEDICINE

## 2023-11-27 PROCEDURE — 2500000004 HC RX 250 GENERAL PHARMACY W/ HCPCS (ALT 636 FOR OP/ED): Performed by: INTERNAL MEDICINE

## 2023-11-27 PROCEDURE — 97530 THERAPEUTIC ACTIVITIES: CPT | Mod: GP,CQ

## 2023-11-27 PROCEDURE — 2500000001 HC RX 250 WO HCPCS SELF ADMINISTERED DRUGS (ALT 637 FOR MEDICARE OP): Performed by: HOSPITALIST

## 2023-11-27 PROCEDURE — 2500000001 HC RX 250 WO HCPCS SELF ADMINISTERED DRUGS (ALT 637 FOR MEDICARE OP): Performed by: STUDENT IN AN ORGANIZED HEALTH CARE EDUCATION/TRAINING PROGRAM

## 2023-11-27 PROCEDURE — 2500000004 HC RX 250 GENERAL PHARMACY W/ HCPCS (ALT 636 FOR OP/ED): Performed by: HOSPITALIST

## 2023-11-27 PROCEDURE — 2500000001 HC RX 250 WO HCPCS SELF ADMINISTERED DRUGS (ALT 637 FOR MEDICARE OP): Performed by: INTERNAL MEDICINE

## 2023-11-27 PROCEDURE — 2500000004 HC RX 250 GENERAL PHARMACY W/ HCPCS (ALT 636 FOR OP/ED): Performed by: STUDENT IN AN ORGANIZED HEALTH CARE EDUCATION/TRAINING PROGRAM

## 2023-11-27 PROCEDURE — 99233 SBSQ HOSP IP/OBS HIGH 50: CPT | Performed by: STUDENT IN AN ORGANIZED HEALTH CARE EDUCATION/TRAINING PROGRAM

## 2023-11-27 RX ADMIN — POLYETHYLENE GLYCOL 3350 17 G: 17 POWDER, FOR SOLUTION ORAL at 08:35

## 2023-11-27 RX ADMIN — OXYCODONE HYDROCHLORIDE 10 MG: 5 TABLET ORAL at 01:24

## 2023-11-27 RX ADMIN — DICLOFENAC SODIUM 1 APPLICATION: 10 GEL TOPICAL at 21:58

## 2023-11-27 RX ADMIN — SUCRALFATE 1 G: 1 TABLET ORAL at 11:53

## 2023-11-27 RX ADMIN — GABAPENTIN 800 MG: 400 CAPSULE ORAL at 06:28

## 2023-11-27 RX ADMIN — LAMOTRIGINE 400 MG: 100 TABLET ORAL at 21:56

## 2023-11-27 RX ADMIN — BACLOFEN 20 MG: 10 TABLET ORAL at 08:35

## 2023-11-27 RX ADMIN — DICLOFENAC SODIUM 1 APPLICATION: 10 GEL TOPICAL at 15:34

## 2023-11-27 RX ADMIN — DOCUSATE SODIUM 100 MG: 100 CAPSULE, LIQUID FILLED ORAL at 08:35

## 2023-11-27 RX ADMIN — OMEPRAZOLE 40 MG: 20 CAPSULE, DELAYED RELEASE ORAL at 06:28

## 2023-11-27 RX ADMIN — NYSTATIN 1 APPLICATION: 100000 POWDER TOPICAL at 21:58

## 2023-11-27 RX ADMIN — DICLOFENAC SODIUM 1 APPLICATION: 10 GEL TOPICAL at 08:35

## 2023-11-27 RX ADMIN — BACLOFEN 10 MG: 10 TABLET ORAL at 15:26

## 2023-11-27 RX ADMIN — ACETAMINOPHEN 650 MG: 325 TABLET ORAL at 16:10

## 2023-11-27 RX ADMIN — DOCUSATE SODIUM 100 MG: 100 CAPSULE, LIQUID FILLED ORAL at 21:57

## 2023-11-27 RX ADMIN — SUCRALFATE 1 G: 1 TABLET ORAL at 06:28

## 2023-11-27 RX ADMIN — ONDANSETRON 4 MG: 2 INJECTION INTRAMUSCULAR; INTRAVENOUS at 13:13

## 2023-11-27 RX ADMIN — OXYCODONE HYDROCHLORIDE 10 MG: 5 TABLET ORAL at 08:34

## 2023-11-27 RX ADMIN — ENOXAPARIN SODIUM 40 MG: 40 INJECTION SUBCUTANEOUS at 21:58

## 2023-11-27 RX ADMIN — OMEPRAZOLE 40 MG: 20 CAPSULE, DELAYED RELEASE ORAL at 15:27

## 2023-11-27 RX ADMIN — SUCRALFATE 1 G: 1 TABLET ORAL at 15:27

## 2023-11-27 RX ADMIN — OXYCODONE HYDROCHLORIDE 10 MG: 5 TABLET ORAL at 02:56

## 2023-11-27 RX ADMIN — NYSTATIN 1 APPLICATION: 100000 POWDER TOPICAL at 08:35

## 2023-11-27 RX ADMIN — OXYCODONE HYDROCHLORIDE 10 MG: 5 TABLET ORAL at 13:11

## 2023-11-27 RX ADMIN — BACLOFEN 20 MG: 10 TABLET ORAL at 21:57

## 2023-11-27 RX ADMIN — OXYCODONE HYDROCHLORIDE 10 MG: 5 TABLET ORAL at 17:36

## 2023-11-27 RX ADMIN — SUCRALFATE 1 G: 1 TABLET ORAL at 21:56

## 2023-11-27 RX ADMIN — HYDROMORPHONE HYDROCHLORIDE 0.4 MG: 1 INJECTION, SOLUTION INTRAMUSCULAR; INTRAVENOUS; SUBCUTANEOUS at 06:28

## 2023-11-27 RX ADMIN — GABAPENTIN 800 MG: 400 CAPSULE ORAL at 21:57

## 2023-11-27 RX ADMIN — OXYCODONE HYDROCHLORIDE 10 MG: 5 TABLET ORAL at 21:58

## 2023-11-27 RX ADMIN — GABAPENTIN 800 MG: 400 CAPSULE ORAL at 15:26

## 2023-11-27 ASSESSMENT — COGNITIVE AND FUNCTIONAL STATUS - GENERAL
MOVING FROM LYING ON BACK TO SITTING ON SIDE OF FLAT BED WITH BEDRAILS: A LOT
STANDING UP FROM CHAIR USING ARMS: TOTAL
HELP NEEDED FOR BATHING: A LOT
TURNING FROM BACK TO SIDE WHILE IN FLAT BAD: A LOT
TURNING FROM BACK TO SIDE WHILE IN FLAT BAD: A LOT
STANDING UP FROM CHAIR USING ARMS: TOTAL
CLIMB 3 TO 5 STEPS WITH RAILING: TOTAL
DRESSING REGULAR UPPER BODY CLOTHING: A LOT
WALKING IN HOSPITAL ROOM: TOTAL
TOILETING: A LOT
WALKING IN HOSPITAL ROOM: TOTAL
MOVING FROM LYING ON BACK TO SITTING ON SIDE OF FLAT BED WITH BEDRAILS: A LOT
MOVING TO AND FROM BED TO CHAIR: A LOT
PERSONAL GROOMING: A LOT
DRESSING REGULAR LOWER BODY CLOTHING: A LOT
DAILY ACTIVITIY SCORE: 14
MOBILITY SCORE: 9
MOBILITY SCORE: 9
CLIMB 3 TO 5 STEPS WITH RAILING: TOTAL
MOVING TO AND FROM BED TO CHAIR: A LOT

## 2023-11-27 ASSESSMENT — PAIN - FUNCTIONAL ASSESSMENT: PAIN_FUNCTIONAL_ASSESSMENT: 0-10

## 2023-11-27 ASSESSMENT — PAIN DESCRIPTION - DESCRIPTORS: DESCRIPTORS: ACHING

## 2023-11-27 ASSESSMENT — PAIN SCALES - GENERAL
PAINLEVEL_OUTOF10: 8
PAINLEVEL_OUTOF10: 9
PAINLEVEL_OUTOF10: 9

## 2023-11-27 ASSESSMENT — PAIN DESCRIPTION - LOCATION: LOCATION: BACK

## 2023-11-27 NOTE — PROGRESS NOTES
Physical Therapy    Physical Therapy Treatment    Patient Name: Berta East  MRN: [id number]  Today's Date: 11/27/2023  Time Calculation  Start Time: 0948  Stop Time: 1000  Time Calculation (min): 12 min       Assessment/Plan   PT Assessment  PT Assessment Results: Decreased mobility, Decreased strength  Rehab Prognosis: Good  Evaluation/Treatment Tolerance: Patient tolerated treatment well  Medical Staff Made Aware: Yes  Barriers to Participation:  (pt reports too much activity causes her to regress in ability to perform transfers / mobility .)  End of Session Communication: Bedside nurse  Assessment Comment: pt would benefit from continued therapy to improved functional mobilityand safety  End of Session Patient Position:  (on BSC with call light within reach, nursing aware)     Treatment/Interventions: Transfer training, Therapeutic activity, Bed mobility  PT Plan: Skilled PT  PT Frequency: 2 times per week  PT Discharge Recommendations: Low intensity level of continued care    PT Recommended Transfer Status: Assistive device, Assist x2    General Visit Information:   PT  Visit  PT Received On: 11/27/23  General  Reason for Referral: decreased mobility  Past Medical History Relevant to Rehab: Friedreichs ataxia, Rayo-en-y gastric bypass  Family/Caregiver Present: No  Prior to Session Communication: Bedside nurse  Patient Position Received: Bed, 2 rail up  General Comment: pt requesting to use bedside commode , nursing requesting assistancce    General Observations:   General Observation: pt supine with B LE supported on pillows, pt's LE abd with knees bent upon arrival (seated on BSC with call light at end of session, nursing aware, stating pt will require some extra time and they will transfer back to bed)    Subjective     Precautions:  Precautions  Medical Precautions: Fall precautions  Braces Applied: Pt. statest that she has B foot braces/shoes that she has to wear where transferring or up on her feet;  pt. abducts from hips through ankles without braces. (No braces here)  Precautions Comment: shoes donned prior to transfer    Vital Signs:     Objective     Pain:  Pain Assessment  Pain Assessment: 0-10  Pain Score: 9  Pain Type: Chronic pain (states that she is not due for pain medication yet)  Pain Location: Back  Pain Orientation: Lower  Pain Descriptors: Aching  Pain Frequency: Constant/continuous  Pain Interventions: Cold applied (pain increased to 9/10 with activity)    Cognition:  Cognition  Overall Cognitive Status: Within Functional Limits    Postural Control:       Extremity/Trunk Assessments:                Activity Tolerance:  Activity Tolerance  Endurance: Decreased tolerance for upright activites    Treatments:  Therapeutic Activity  Therapeutic Activity 1: pt tolerated sitting EOB for 5 min interva with mod A to maintain sitting balance , pt needs assitance to position LE on floor, declined use of gait belt     Bed Mobility  Bed Mobility: Yes  Bed Mobility 1  Bed Mobility Comments 1: transfers supine --> sit with Mod A ,needs assitance advancing LE's , and for trunk control ,  uses UE to assit scooting to the edge of the bed       Scooting  Level of Assistance 3: Moderate assistance  Ambulation/Gait Training  Ambulation/Gait Training Performed: Yes  Transfers  Transfer: Yes  Transfer 1  Technique 1: Sit to stand, Stand to sit  Trials/Comments 1: pt stating her LE's hurt too much to take steps , stating she needs to do stand pivot transfer and has been performing with nursing, shoes donned prior to transfer (nursig transfered pt from bedto bedside commode via Max A stand pivot transfer with two extra people for safety and to guide pt, pt needed asssitance positoning and supported LE's prior to transfer)  Stairs  Stairs: No       Outcome Measures:  [facility] Basic Mobility  Turning from your back to your side while in a flat bed without using bedrails: A lot  Moving from lying on your back to sitting on the  side of a flat bed without using bedrails: A lot  Moving to and from bed to chair (including a wheelchair): A lot  Standing up from a chair using your arms (e.g. wheelchair or bedside chair): Total  To walk in hospital room: Total  Climbing 3-5 steps with railing: Total  Basic Mobility - Total Score: 9  Education Documentation  Mobility Training, taught by Grecia Jewell PTA at 11/27/2023 12:45 PM.  Learner: Patient  Readiness: Acceptance  Method: Explanation  Response: Verbalizes Understanding, Needs Reinforcement    Mobility Training, taught by Grecia Jewell PTA at 11/20/2023  3:43 PM.  Learner: Patient  Readiness: Acceptance  Method: Explanation  Response: Verbalizes Understanding, Needs Reinforcement    Education Comments  No comments found.        EDUCATION:  Outpatient Education  Individual(s) Educated: Patient  Education Provided: Fall Risk, Home Safety  Patient Response to Education: Patient/Caregiver Verbalized Understanding of Information    Encounter Problems       Encounter Problems (Active)       PT Problem       Pt will demonstrate mod A x 1 with bed mobility to edge of bed.   (Progressing)       Start:  11/19/23    Expected End:  11/25/23            Pt will demonstrate  slideboard transfers with mod A x 1 to WC .   (Not Progressing)       Start:  11/19/23    Expected End:  11/25/23            pt to demo sitting balance of fair to aide in slideboard transfers .  (Progressing)       Start:  11/19/23    Expected End:  11/25/23               Pain - Adult

## 2023-11-27 NOTE — PROGRESS NOTES
Chart reviewed, spoke with attending. Anticipate discharge 1-2 days. Updated [facility].  ALYSSIA Molina

## 2023-11-27 NOTE — PROGRESS NOTES
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  - has chronic issues with low back pain 2/2 lumbar radiculopathy and muscle spasms, has known lumbar spondylosis with documented L4/L5 disc protrusion  - home regimen is Percocet 10mg QID, baclofen 20/10/20, gabapentin 600mg tid, and lamotrigine 400mg at bedtime  - follows with pain  and PM+R for steroid injections/nerve blocks  - she is supposed to see [facility] physician for possible nerve ablation as outpt  - baseline wheelchair bound  - CT C/A/P, CT T/L spine neg for acute findings this admission  - no red flag symptoms to warrant rpt MRI at this time  Plan:  - cont home Percocet Q6H PRN, with IV Dilaudid added for breakthrough, PRN Narcan ordered, daily Miralax  - cont with increased gabapentin dose of 800mg TID, cont home Lamictal at bedtime for neuropathic pain  - cont home Baclofen, add Robaxin as well for breakthrough muscle spasms  - topical voltaren gel  - will avoid systemic NSAIDs and steroids due to history of gastric pouch ulceration  - PT/OT  - pain mgmt consulted, per their note, possible interventional pain procedure to be done     Vte ppx lovenox     11/21/23  - Patient still in persistent pain. With little relief with pain meds.   - requesting ativan which was given overnight.  Will hold off.   - Scheduled for RFA of bilateral L3, 4 , 5 branches. Will assess for success  - appreciate pain mgt recs.   - On home pain regimen.      11/22/23  -  Patient is status post bilateral L3, L4 and L5 RFA medial branch.  -  Pain management continuing to monitor on have switch patient from oxycodone/acetaminophen to pure oxycodone 10 mg every 4 hours.  -  Steroids were also added to his regimen due to acute inflammatory effect post RFA  - continue to monitor  - Will send for basic labs tomorrow.  -  Continue pain regimen    "  11/23/23  -   Patient remains stable.  No acute changes in pain.  -  Patient unsure if there has been any significant improvement at this point in time but reports that she is expecting, improvement over the next day or 2 due to steroids.  -  Will defer rest of care to pain management.     11/24/23  -   Patient remains stable.  Reports that pain particular in the lower back is probably improving slightly.  -  Continuing steroids per pain management regimen.  -  Continue pain medication per pain management  - patient has not had a bowel movement in 3 days.  We will adjust bowel regimen.  -  Rest of care as above     11/25/23  - patient with no acute events overnight.  Patient reports that she has improved slightly in terms of pain she has.   -  Docusate was added to bowel regimen.  -  Continue with Carafate for her  reflux symptoms  -   Rest of care per pain management    11/26/23  -Saw and evaluated patient earlier in the day  and a second time in the afternoon per her request.  - patient with no complaints in the morning but in the evening complaining of a swollen knee which she reports was caught under her  while she was being transferred  between bed and chairand there was knee extension which was painful.  no reports swollen knee with ice pack placed by prior nurse for comfort.  -  Patient is asking for first additional pain medication and does not want to be weaned off of IV pain meds at this point in time.  second patient is asking that her  opioids be changed to more potent formulations when being discharged because per her \"this is what has always been done\"  - explained to patient that we have to wean her off IV pain medication and for her to go home.  decrease frequency of IV Dilaudid and did breakthrough oxycodone 10 mg available.  - Explained to patient that since she is being seen by pain management that I was uncomfortable and unwilling and it was not indicated to change her pain medications as she " was requesting.  -  Earlier in hospitalization patient reports that she was getting Ativan as outpatient.  However there is no Ativan  associated with patient on state restricted medication  reporting system  -  Patient was informed that her knee would be treated conservatively with ice pack as well as topical/Voltaren and wrapped with Ace wrap.   Nursing requesting x-ray of the knee but this is not indicated at this point in time.      This note was prepared using Dragon Medical voice recognition software. As a result, errors may occur. When identified, these errors have been corrected. While every attempt is made to correct errors during dictation, errors may still exist.      Joyce Schwarz MD    SUBJECTIVE       Patient seen and assessed.  Patient had no complaints  in the morning but asked to be seen again in the evening.      OBJECTIVE:     Last Recorded Vitals:  Vitals:    11/26/23 0757 11/26/23 1357 11/26/23 2037 11/27/23 0804   BP: 102/58 108/53 124/60 93/55   BP Location: Right arm      Patient Position: Sitting      Pulse: 80 71 75 68   Resp: 16   16   Temp: 36.1 °C (97 °F) 36.4 °C (97.5 °F) 35.9 °C (96.6 °F) 36 °C (96.8 °F)   TempSrc: Temporal      SpO2: 96% 99% 97% 95%   Weight:       Height:         Last I/O:  No intake/output data recorded.    Physical Exam    GEN: healthy appearing, appears stated age, NAD  HEENT: NCAT  CV: RRR, no m/r/g, no LE edema  LUNGS: CTAB, no w/r/c  ABD: soft, NT  SKIN: no rashes  NEURO: A+Ox3, baseline BLE 1-2/5 strength, baseline dysarthria  PSYCH: appropriate mood, affect  EXT: Right knee with mild edema no erythema and mildly warm.  Some pain with passive motion focused at the joint    Inpatient Medications:  baclofen, 10 mg, oral, q24h  baclofen, 20 mg, oral, BID  diclofenac sodium, 4 g, Topical, TID  docusate sodium, 100 mg, oral, BID  enoxaparin, 40 mg, subcutaneous, Daily  gabapentin, 800 mg, oral, q8h ALTON  lamoTRIgine, 400 mg, oral, Nightly  lidocaine, 5 mL,  "infiltration, Once  nystatin, 1 Application, Topical, BID  omeprazole, 40 mg, oral, BID AC  polyethylene glycol, 17 g, oral, Daily  sucralfate, 1 g, oral, Before meals & nightly    PRN Medications  PRN medications: acetaminophen, albuterol, calcium carbonate, HYDROmorphone, methocarbamol, naloxone, ondansetron, oxyCODONE, oxyCODONE  Continuous Medications:     LABS AND IMAGING:     Labs:  Results from last 7 days   Lab Units 11/21/23  0539   WBC AUTO x10*3/uL 5.8   RBC AUTO x10*6/uL 3.97*   HEMOGLOBIN g/dL 11.6*   HEMATOCRIT % 35.9*   MCV fL 90   MCH pg 29.2   MCHC g/dL 32.3   RDW % 13.4   PLATELETS AUTO x10*3/uL 194           No lab exists for component: \"GFRAA\", \"LABGLOM\", \"LABALBU\"          Imaging:  FL less than 1 hour  These images are not reportable by radiology and will not be interpreted   by  Radiologists.       "

## 2023-11-27 NOTE — PROGRESS NOTES
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  - Patient's family states that she had a 6 level nerve ablation on 11/21  - Imaging reviewed from admission as well as progress notes and pain regimen  - Pain management following  - Continue decrease IV pain medications to twice daily dosing with ending at 2 more doses  - Continue oral regimen, plan is to discharge tomorrow with home oral regimen  - Home gabapentin increased to 800mg QID    OARRS/PDMP reviewed, patient receives gabapentin 600mg TID and percocet 10-325mg QID on a monthly basis (gabapentin recently increased from 400mg TID to 600mg TID 11/6)    VTE Prophylaxis: Enoxaparin subcutaneous    Disposition/Daily update: Patient and her mother who was present on the phone about current pain regimen.  They are aware that we are planning to stop IV Dilaudid tonight with plans to continue only oral regimen for discharge hopefully in the next 24 to 48 hours.  Attempted to speak with patient's primary pain physician who performed a spinal nerve ablation on 11/21, Dr. Israel Sparks, however unable to reach the office goes to voicemail.      ---Of note, this documentation is completed using the Dragon Dictation system (voice recognition software). There may be spelling and/or grammatical errors that were not corrected prior to final submission.--- Had extensive discussion with    Geronimo Aceves MD    SUBJECTIVE     Patient was seen and examined at bedside this morning.  She has continued pain in her lower back.  Patient's mom was on the phone during conversation.  States that the pain regimen is really working.  Is aware that we are decreasing her IV pain meds in anticipation of discharge.    OBJECTIVE:     Last Recorded Vitals:  Vitals:    11/26/23 1357 11/26/23 2037 11/27/23 0804 11/27/23 1518    BP: 108/53 124/60 93/55 107/51   BP Location:       Patient Position:    Sitting   Pulse: 71 75 68 80   Resp:   16 18   Temp: 36.4 °C (97.5 °F) 35.9 °C (96.6 °F) 36 °C (96.8 °F) 36.4 °C (97.5 °F)   TempSrc:       SpO2: 99% 97% 95% 96%   Weight:       Height:         Last I/O:  No intake/output data recorded.    Physical Exam  Vitals reviewed.   Constitutional:       General: She is not in acute distress.     Appearance: Normal appearance. She is not toxic-appearing.   HENT:      Head: Normocephalic and atraumatic.   Eyes:      Extraocular Movements: Extraocular movements intact.      Conjunctiva/sclera: Conjunctivae normal.   Cardiovascular:      Rate and Rhythm: Normal rate and regular rhythm.      Pulses: Normal pulses.      Heart sounds: Normal heart sounds.   Pulmonary:      Effort: No respiratory distress.      Breath sounds: Normal breath sounds.   Abdominal:      General: Bowel sounds are normal.      Palpations: Abdomen is soft.      Tenderness: There is no abdominal tenderness.   Musculoskeletal:      Cervical back: Normal range of motion and neck supple.      Comments: Range of motion minimally in the lower extremities.  Full range of motion upper extremities.   Neurological:      Mental Status: She is alert and oriented to person, place, and time. Mental status is at baseline.      Motor: Weakness present.         Inpatient Medications:  baclofen, 10 mg, oral, q24h  baclofen, 20 mg, oral, BID  diclofenac sodium, 4 g, Topical, TID  docusate sodium, 100 mg, oral, BID  enoxaparin, 40 mg, subcutaneous, Daily  gabapentin, 800 mg, oral, q8h ALTON  lamoTRIgine, 400 mg, oral, Nightly  lidocaine, 5 mL, infiltration, Once  nystatin, 1 Application, Topical, BID  omeprazole, 40 mg, oral, BID AC  polyethylene glycol, 17 g, oral, Daily  sucralfate, 1 g, oral, Before meals & nightly    PRN Medications  PRN medications: acetaminophen, albuterol, calcium carbonate, HYDROmorphone, methocarbamol, naloxone, ondansetron,  "oxyCODONE, oxyCODONE  Continuous Medications:     LABS AND IMAGING:     Labs:  Results from last 7 days   Lab Units 11/21/23  0539   WBC AUTO x10*3/uL 5.8   RBC AUTO x10*6/uL 3.97*   HEMOGLOBIN g/dL 11.6*   HEMATOCRIT % 35.9*   MCV fL 90   MCH pg 29.2   MCHC g/dL 32.3   RDW % 13.4   PLATELETS AUTO x10*3/uL 194           No lab exists for component: \"GFRAA\", \"LABGLOM\", \"LABALBU\"          Imaging:  FL less than 1 hour  These images are not reportable by radiology and will not be interpreted   by  Radiologists.     "

## 2023-11-28 PROCEDURE — 2500000001 HC RX 250 WO HCPCS SELF ADMINISTERED DRUGS (ALT 637 FOR MEDICARE OP): Performed by: ANESTHESIOLOGY

## 2023-11-28 PROCEDURE — 99232 SBSQ HOSP IP/OBS MODERATE 35: CPT | Performed by: INTERNAL MEDICINE

## 2023-11-28 PROCEDURE — 96372 THER/PROPH/DIAG INJ SC/IM: CPT | Performed by: INTERNAL MEDICINE

## 2023-11-28 PROCEDURE — 1210000001 HC SEMI-PRIVATE ROOM DAILY

## 2023-11-28 PROCEDURE — 2500000004 HC RX 250 GENERAL PHARMACY W/ HCPCS (ALT 636 FOR OP/ED): Performed by: INTERNAL MEDICINE

## 2023-11-28 PROCEDURE — 2500000004 HC RX 250 GENERAL PHARMACY W/ HCPCS (ALT 636 FOR OP/ED): Performed by: STUDENT IN AN ORGANIZED HEALTH CARE EDUCATION/TRAINING PROGRAM

## 2023-11-28 PROCEDURE — 2500000004 HC RX 250 GENERAL PHARMACY W/ HCPCS (ALT 636 FOR OP/ED): Performed by: HOSPITALIST

## 2023-11-28 PROCEDURE — 2500000001 HC RX 250 WO HCPCS SELF ADMINISTERED DRUGS (ALT 637 FOR MEDICARE OP): Performed by: STUDENT IN AN ORGANIZED HEALTH CARE EDUCATION/TRAINING PROGRAM

## 2023-11-28 PROCEDURE — 2500000001 HC RX 250 WO HCPCS SELF ADMINISTERED DRUGS (ALT 637 FOR MEDICARE OP): Performed by: INTERNAL MEDICINE

## 2023-11-28 PROCEDURE — 2500000001 HC RX 250 WO HCPCS SELF ADMINISTERED DRUGS (ALT 637 FOR MEDICARE OP): Performed by: HOSPITALIST

## 2023-11-28 RX ORDER — OXYCODONE HYDROCHLORIDE 5 MG/1
10 TABLET ORAL EVERY 6 HOURS PRN
Status: DISCONTINUED | OUTPATIENT
Start: 2023-11-28 | End: 2023-11-29

## 2023-11-28 RX ORDER — OXYCODONE HYDROCHLORIDE 5 MG/1
15 TABLET ORAL EVERY 4 HOURS PRN
Status: DISCONTINUED | OUTPATIENT
Start: 2023-11-28 | End: 2023-11-28

## 2023-11-28 RX ORDER — BACLOFEN 10 MG/1
20 TABLET ORAL EVERY 24 HOURS
Status: DISCONTINUED | OUTPATIENT
Start: 2023-11-28 | End: 2023-12-01 | Stop reason: HOSPADM

## 2023-11-28 RX ORDER — MORPHINE SULFATE 15 MG/1
15 TABLET, FILM COATED, EXTENDED RELEASE ORAL EVERY 12 HOURS SCHEDULED
Status: DISCONTINUED | OUTPATIENT
Start: 2023-11-28 | End: 2023-11-30

## 2023-11-28 RX ADMIN — DICLOFENAC SODIUM 1 APPLICATION: 10 GEL TOPICAL at 22:00

## 2023-11-28 RX ADMIN — BACLOFEN 20 MG: 10 TABLET ORAL at 21:59

## 2023-11-28 RX ADMIN — OXYCODONE HYDROCHLORIDE 10 MG: 5 TABLET ORAL at 23:38

## 2023-11-28 RX ADMIN — BACLOFEN 20 MG: 10 TABLET ORAL at 10:02

## 2023-11-28 RX ADMIN — HYDROMORPHONE HYDROCHLORIDE 0.4 MG: 1 INJECTION, SOLUTION INTRAMUSCULAR; INTRAVENOUS; SUBCUTANEOUS at 17:35

## 2023-11-28 RX ADMIN — OXYCODONE HYDROCHLORIDE 10 MG: 5 TABLET ORAL at 13:51

## 2023-11-28 RX ADMIN — NYSTATIN 1 APPLICATION: 100000 POWDER TOPICAL at 22:01

## 2023-11-28 RX ADMIN — OMEPRAZOLE 40 MG: 20 CAPSULE, DELAYED RELEASE ORAL at 16:23

## 2023-11-28 RX ADMIN — HYDROMORPHONE HYDROCHLORIDE 0.4 MG: 1 INJECTION, SOLUTION INTRAMUSCULAR; INTRAVENOUS; SUBCUTANEOUS at 00:23

## 2023-11-28 RX ADMIN — SUCRALFATE 1 G: 1 TABLET ORAL at 22:00

## 2023-11-28 RX ADMIN — ONDANSETRON 4 MG: 2 INJECTION INTRAMUSCULAR; INTRAVENOUS at 17:43

## 2023-11-28 RX ADMIN — METHOCARBAMOL TABLETS 500 MG: 500 TABLET, COATED ORAL at 23:57

## 2023-11-28 RX ADMIN — OMEPRAZOLE 40 MG: 20 CAPSULE, DELAYED RELEASE ORAL at 10:02

## 2023-11-28 RX ADMIN — GABAPENTIN 800 MG: 400 CAPSULE ORAL at 09:48

## 2023-11-28 RX ADMIN — OXYCODONE HYDROCHLORIDE 10 MG: 5 TABLET ORAL at 09:48

## 2023-11-28 RX ADMIN — NYSTATIN 1 APPLICATION: 100000 POWDER TOPICAL at 09:00

## 2023-11-28 RX ADMIN — ENOXAPARIN SODIUM 40 MG: 40 INJECTION SUBCUTANEOUS at 21:58

## 2023-11-28 RX ADMIN — DOCUSATE SODIUM 100 MG: 100 CAPSULE, LIQUID FILLED ORAL at 21:59

## 2023-11-28 RX ADMIN — POLYETHYLENE GLYCOL 3350 17 G: 17 POWDER, FOR SOLUTION ORAL at 10:02

## 2023-11-28 RX ADMIN — DICLOFENAC SODIUM 1 APPLICATION: 10 GEL TOPICAL at 15:00

## 2023-11-28 RX ADMIN — MORPHINE SULFATE 15 MG: 15 TABLET, EXTENDED RELEASE ORAL at 12:46

## 2023-11-28 RX ADMIN — GABAPENTIN 800 MG: 400 CAPSULE ORAL at 13:50

## 2023-11-28 RX ADMIN — MORPHINE SULFATE 15 MG: 15 TABLET, EXTENDED RELEASE ORAL at 21:58

## 2023-11-28 RX ADMIN — SUCRALFATE 1 G: 1 TABLET ORAL at 10:02

## 2023-11-28 RX ADMIN — LAMOTRIGINE 400 MG: 100 TABLET ORAL at 21:59

## 2023-11-28 RX ADMIN — GABAPENTIN 800 MG: 400 CAPSULE ORAL at 22:00

## 2023-11-28 RX ADMIN — DICLOFENAC SODIUM 1 APPLICATION: 10 GEL TOPICAL at 09:00

## 2023-11-28 RX ADMIN — SUCRALFATE 1 G: 1 TABLET ORAL at 16:23

## 2023-11-28 RX ADMIN — METHOCARBAMOL TABLETS 500 MG: 500 TABLET, COATED ORAL at 11:42

## 2023-11-28 ASSESSMENT — PAIN SCALES - GENERAL
PAINLEVEL_OUTOF10: 9
PAINLEVEL_OUTOF10: 7
PAINLEVEL_OUTOF10: 5 - MODERATE PAIN
PAINLEVEL_OUTOF10: 9
PAINLEVEL_OUTOF10: 8
PAINLEVEL_OUTOF10: 8
PAINLEVEL_OUTOF10: 10 - WORST POSSIBLE PAIN
PAINLEVEL_OUTOF10: 9

## 2023-11-28 ASSESSMENT — PAIN - FUNCTIONAL ASSESSMENT
PAIN_FUNCTIONAL_ASSESSMENT: 0-10

## 2023-11-28 ASSESSMENT — PAIN DESCRIPTION - LOCATION
LOCATION: BACK
LOCATION: BACK

## 2023-11-28 NOTE — PROGRESS NOTES
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 Pang  - s/p short systemic steroid course after RFA as well  Plan:  - spoke with pain mgmt and adjusted pain regimen as below  - start MS Contin 15mg BID, cont Percocet 10mg Q6H PRN for severe pain, dc IV Dilaudid, PRN Narcan ordered, daily bowel regimen  - cont with increased gabapentin dose of 800mg TID, cont home Lamictal at bedtime for neuropathic pain  - increase home Baclofen to 20mg TID, cont Robaxin as well for breakthrough muscle spasms  - cont topical voltaren gel  - will avoid systemic NSAIDs and steroids due to history of gastric pouch ulceration  - PT/OT, dispo will [facility] home with [facility]  - she still feels significant pain with transfers, feels unsafe going home     Vte ppx lovenox    Juan Carlos Sagastume MD    SUBJECTIVE     PITOON. Still having significant pain with transfers. Went a significant amount of time, ~18 hours, without IVP Dilaudid.     OBJECTIVE:     Last Recorded Vitals:  Vitals:    11/27/23 1518 11/27/23 1934 11/28/23 0042 11/28/23 0810   BP: 107/51 114/53 117/58 110/58   Patient Position: Sitting      Pulse: 80 79 67    Resp: 18 16 17 16   Temp: 36.4 °C (97.5 °F) 36.4 °C (97.5 °F) 36.2 °C (97.2 °F) 36.5 °C (97.7 °F)   TempSrc:       SpO2: 96% 95% 96%    Weight:       Height:      "    Last I/O:  I/O last 3 completed shifts:  In: - (0 mL/kg)   Out: 450 (5.4 mL/kg) [Urine:450 (0.1 mL/kg/hr)]  Weight: 83.9 kg     Physical Exam  Vitals reviewed.   Constitutional:       General: She is not in acute distress.     Appearance: Normal appearance. She is not toxic-appearing.   HENT:      Head: Normocephalic and atraumatic.   Eyes:      Extraocular Movements: Extraocular movements intact.      Conjunctiva/sclera: Conjunctivae normal.   Cardiovascular:      Rate and Rhythm: Normal rate and regular rhythm.      Pulses: Normal pulses.      Heart sounds: Normal heart sounds.   Pulmonary:      Effort: No respiratory distress.      Breath sounds: Normal breath sounds.   Abdominal:      General: Bowel sounds are normal.      Palpations: Abdomen is soft.      Tenderness: There is no abdominal tenderness.   Musculoskeletal:      Cervical back: Normal range of motion and neck supple.      Comments: Range of motion minimally in the lower extremities.  Full range of motion upper extremities.   Neurological:      Mental Status: She is alert and oriented to person, place, and time. Mental status is at baseline.      Motor: Weakness present.         Inpatient Medications:  baclofen, 20 mg, oral, BID  baclofen, 20 mg, oral, q24h  diclofenac sodium, 4 g, Topical, TID  docusate sodium, 100 mg, oral, BID  enoxaparin, 40 mg, subcutaneous, Daily  gabapentin, 800 mg, oral, q8h ALTON  lamoTRIgine, 400 mg, oral, Nightly  nystatin, 1 Application, Topical, BID  omeprazole, 40 mg, oral, BID AC  polyethylene glycol, 17 g, oral, Daily  sucralfate, 1 g, oral, Before meals & nightly    PRN Medications  PRN medications: acetaminophen, albuterol, calcium carbonate, HYDROmorphone, methocarbamol, naloxone, ondansetron, oxyCODONE  Continuous Medications:     LABS AND IMAGING:     Labs:              No lab exists for component: \"GFRAA\", \"LABGLOM\", \"LABALBU\"          Imaging:  FL less than 1 hour  These images are not reportable by radiology " and will not be interpreted   by  Radiologists.

## 2023-11-28 NOTE — PROGRESS NOTES
Occupational Therapy                 Therapy Communication Note    Patient Name: Berta East  MRN: [id number]  Today's Date: 11/28/2023     Discipline: Occupational Therapy    Missed Visit Reason: Missed Visit Reason:  (Pt politely declining OT at this time. Pt states that she is in a lot of pain from transfer from [facility] and she wants to conserve her energy in case of discharge home this date. RN at bedside providing pain medications. Will reattempt as able/ pt willing.)    Missed Time: Attempt 1141

## 2023-11-28 NOTE — CONSULTS
"Nutrition Note  Reason for Assessment: Length of stay    Nutrition Assessment        Energy Intake: Fair 50-75 %  Food and Nutrient History: Pt reports she is just picking at her food, mostly eating fruit this admission. States she was eating well PTA with no changes in appetite or intake but has been nauseated since admission which has inhibited her PO intake. States her nausea is due to stomach ulcers. Has had ensure/boost in the past after she had bariatric surgery (not listed in pt's PMH). Reports taking miralax regularly to prevent constipation from pain medication. Agreeable to ensure while admitted to help meet nutrition needs. Denies issues chewing/swallowing.  Vitamin/Herbal Supplement Use: none listed in home med list    PMH, meds, and labs reviewed.  Dietary Orders (From admission, onward)       Start     Ordered    11/28/23 1151  Oral nutritional supplements  Until discontinued        Comments: Can sub regular ensure if high protein is not available   Question Answer Comment   Deliver with All meals    Select supplement: Ensure High Protein        11/28/23 1151    11/19/23 0103  Adult diet Regular  Diet effective now        Question:  Diet type  Answer:  Regular    11/19/23 0102    11/19/23 0057  May Participate in Room Service  Once        Question:  .  Answer:  Yes    11/19/23 0057                  Independent    GI per flowsheet:  Gastrointestinal  Gastrointestinal (WDL): Within Defined Limits  Last BM Date: 11/28/23  Stool Appearance: Formed, Soft  Stool Color: Brown  Last bowel movement documented: 11/28/23  Allergies: Patient has no known allergies.     Anthropometrics:  Height: 175.3 cm (5' 9.02\")  Weight: 83.9 kg (184 lb 15.5 oz)  BMI (Calculated): 27.3  IBW: 65.9 kg  ABW:         Weight History / % Weight Change: Pt denies wt changes, states UBW is 180-185#. Wt Readings from Last 10 Encounters:  11/19/23 83.9 kg (185 lb)  03/13/23 81.8 kg (180 lb 5.4 oz)  08/24/22 81.1 kg (178 lb 12.7 oz)  " "02/22/22 81.1 kg (178 lb 12.7 oz). No evidence of significant wt loss.  Significant Weight Loss: No    Estimated Nutritional Needs:  Calculated Energy Needs Using Equations  Height: 175.3 cm (5' 9.02\")  Temp: 36.5 °C (97.7 °F)    Total Energy Estimated Needs (kCal): 2100 kCal  Total Estimated Energy Need per Day (kCal/kg): 25 kCal/kg  Method for Estimating Needs: ABW    Total Protein Estimated Needs (g): 67 g  Total Protein Estimated Needs (g/kg): 0.8 g/kg  Method for Estimating Needs: ABW    Total Fluid Estimated Needs (mL): 2517 mL  Total Fluid Estimated Needs (mL/kg): 30 mL/kg  Method for Estimating Needs: ABW    Nutrition Focused Physical Findings:   Orbital Fat Pads: Defer (not indicated)         Edema  Edema: none    Skin: Positive (lower back wound per nursing assessment)  Pain Score: 8     Nutrition Diagnosis   Malnutrition Diagnosis  Patient has Malnutrition Diagnosis: No    Patient has Nutrition Diagnosis: Yes  Diagnosis Status (1): New  Nutrition Diagnosis 1: Inadequate protein intake  Related to (1): pt consuming mostly carbohydrate food items  As Evidenced by (1): pt reports of eating mostly fruit and little else at meals                                         Nutrition Interventions/Recommendations    Individualized Nutrition Prescription Provided for : Ensure High Protein TID (160 kcal and 16 g protein per serving) can substitute regular ensure if ensure high protein is not available  Interventions: Meals and snacks, Medical food supplement  Medical Food Supplement: Commercial beverage       Nutrition Monitoring and Evaluation    Food/Nutrient Related History Monitoring  Monitoring and Evaluation Plan: Energy intake, Amount of food  Criteria: Pt meets >75% of estimated energy needs  Criteria: Pt consumes >75% of meals and supplements  Body Composition/Growth/Weight History  Monitoring and Evaluation Plan: Weight  Criteria: Maintains stable weight  Biochemical Data, Medical Tests and " Procedures  Monitoring and Evaluation Plan: Glucose/endocrine profile  Glucose/Endocrine Profile: Glucose, casual  Criteria: BG within desirable range      Education Documentation  No documentation found.    Patient with no diet related questions at this time.                  Time Spent (min): 45 minutes  Last Date of Nutrition Visit: 11/28/23  Nutrition Follow-Up Needed?: 3-5 days

## 2023-11-29 PROCEDURE — 99232 SBSQ HOSP IP/OBS MODERATE 35: CPT | Performed by: INTERNAL MEDICINE

## 2023-11-29 PROCEDURE — 96372 THER/PROPH/DIAG INJ SC/IM: CPT | Performed by: INTERNAL MEDICINE

## 2023-11-29 PROCEDURE — 2500000001 HC RX 250 WO HCPCS SELF ADMINISTERED DRUGS (ALT 637 FOR MEDICARE OP): Performed by: NURSE PRACTITIONER

## 2023-11-29 PROCEDURE — 2500000004 HC RX 250 GENERAL PHARMACY W/ HCPCS (ALT 636 FOR OP/ED): Performed by: HOSPITALIST

## 2023-11-29 PROCEDURE — 2500000004 HC RX 250 GENERAL PHARMACY W/ HCPCS (ALT 636 FOR OP/ED): Performed by: INTERNAL MEDICINE

## 2023-11-29 PROCEDURE — 2500000001 HC RX 250 WO HCPCS SELF ADMINISTERED DRUGS (ALT 637 FOR MEDICARE OP): Performed by: INTERNAL MEDICINE

## 2023-11-29 PROCEDURE — 2500000001 HC RX 250 WO HCPCS SELF ADMINISTERED DRUGS (ALT 637 FOR MEDICARE OP): Performed by: STUDENT IN AN ORGANIZED HEALTH CARE EDUCATION/TRAINING PROGRAM

## 2023-11-29 PROCEDURE — 1210000001 HC SEMI-PRIVATE ROOM DAILY

## 2023-11-29 PROCEDURE — 2500000001 HC RX 250 WO HCPCS SELF ADMINISTERED DRUGS (ALT 637 FOR MEDICARE OP): Performed by: HOSPITALIST

## 2023-11-29 RX ORDER — OXYCODONE HYDROCHLORIDE 5 MG/1
10 TABLET ORAL EVERY 4 HOURS PRN
Status: DISCONTINUED | OUTPATIENT
Start: 2023-11-29 | End: 2023-12-01 | Stop reason: HOSPADM

## 2023-11-29 RX ORDER — DIPHENHYDRAMINE HCL 25 MG
25 TABLET ORAL ONCE
Status: COMPLETED | OUTPATIENT
Start: 2023-11-29 | End: 2023-11-29

## 2023-11-29 RX ADMIN — POLYETHYLENE GLYCOL 3350 17 G: 17 POWDER, FOR SOLUTION ORAL at 09:29

## 2023-11-29 RX ADMIN — MORPHINE SULFATE 15 MG: 15 TABLET, EXTENDED RELEASE ORAL at 09:29

## 2023-11-29 RX ADMIN — NYSTATIN 1 APPLICATION: 100000 POWDER TOPICAL at 20:42

## 2023-11-29 RX ADMIN — SUCRALFATE 1 G: 1 TABLET ORAL at 16:31

## 2023-11-29 RX ADMIN — BACLOFEN 20 MG: 10 TABLET ORAL at 09:28

## 2023-11-29 RX ADMIN — OXYCODONE HYDROCHLORIDE 10 MG: 5 TABLET ORAL at 22:27

## 2023-11-29 RX ADMIN — DIPHENHYDRAMINE HYDROCHLORIDE 25 MG: 25 TABLET ORAL at 02:01

## 2023-11-29 RX ADMIN — BACLOFEN 20 MG: 10 TABLET ORAL at 20:41

## 2023-11-29 RX ADMIN — OXYCODONE HYDROCHLORIDE 10 MG: 5 TABLET ORAL at 12:58

## 2023-11-29 RX ADMIN — ACETAMINOPHEN 650 MG: 325 TABLET ORAL at 14:32

## 2023-11-29 RX ADMIN — GABAPENTIN 800 MG: 400 CAPSULE ORAL at 22:27

## 2023-11-29 RX ADMIN — OMEPRAZOLE 40 MG: 20 CAPSULE, DELAYED RELEASE ORAL at 16:31

## 2023-11-29 RX ADMIN — DICLOFENAC SODIUM 1 APPLICATION: 10 GEL TOPICAL at 20:42

## 2023-11-29 RX ADMIN — OXYCODONE HYDROCHLORIDE 10 MG: 5 TABLET ORAL at 17:06

## 2023-11-29 RX ADMIN — NYSTATIN 1 APPLICATION: 100000 POWDER TOPICAL at 09:32

## 2023-11-29 RX ADMIN — DOCUSATE SODIUM 100 MG: 100 CAPSULE, LIQUID FILLED ORAL at 09:29

## 2023-11-29 RX ADMIN — LAMOTRIGINE 400 MG: 100 TABLET ORAL at 20:41

## 2023-11-29 RX ADMIN — OXYCODONE HYDROCHLORIDE 10 MG: 5 TABLET ORAL at 04:56

## 2023-11-29 RX ADMIN — GABAPENTIN 800 MG: 400 CAPSULE ORAL at 14:32

## 2023-11-29 RX ADMIN — OMEPRAZOLE 40 MG: 20 CAPSULE, DELAYED RELEASE ORAL at 06:18

## 2023-11-29 RX ADMIN — DICLOFENAC SODIUM 1 APPLICATION: 10 GEL TOPICAL at 09:32

## 2023-11-29 RX ADMIN — GABAPENTIN 800 MG: 400 CAPSULE ORAL at 06:19

## 2023-11-29 RX ADMIN — ENOXAPARIN SODIUM 40 MG: 40 INJECTION SUBCUTANEOUS at 20:41

## 2023-11-29 RX ADMIN — SUCRALFATE 1 G: 1 TABLET ORAL at 06:18

## 2023-11-29 RX ADMIN — SUCRALFATE 1 G: 1 TABLET ORAL at 11:33

## 2023-11-29 RX ADMIN — BACLOFEN 20 MG: 10 TABLET ORAL at 14:32

## 2023-11-29 RX ADMIN — DICLOFENAC SODIUM 1 APPLICATION: 10 GEL TOPICAL at 14:33

## 2023-11-29 RX ADMIN — MORPHINE SULFATE 15 MG: 15 TABLET, EXTENDED RELEASE ORAL at 20:41

## 2023-11-29 RX ADMIN — SUCRALFATE 1 G: 1 TABLET ORAL at 20:41

## 2023-11-29 RX ADMIN — DOCUSATE SODIUM 100 MG: 100 CAPSULE, LIQUID FILLED ORAL at 20:41

## 2023-11-29 ASSESSMENT — PAIN SCALES - GENERAL
PAINLEVEL_OUTOF10: 9
PAINLEVEL_OUTOF10: 10 - WORST POSSIBLE PAIN
PAINLEVEL_OUTOF10: 9
PAINLEVEL_OUTOF10: 9
PAINLEVEL_OUTOF10: 8
PAINLEVEL_OUTOF10: 10 - WORST POSSIBLE PAIN
PAINLEVEL_OUTOF10: 8

## 2023-11-29 ASSESSMENT — COGNITIVE AND FUNCTIONAL STATUS - GENERAL
DRESSING REGULAR UPPER BODY CLOTHING: A LOT
STANDING UP FROM CHAIR USING ARMS: TOTAL
DAILY ACTIVITIY SCORE: 14
PERSONAL GROOMING: A LOT
MOBILITY SCORE: 9
TOILETING: A LOT
HELP NEEDED FOR BATHING: A LOT
WALKING IN HOSPITAL ROOM: TOTAL
CLIMB 3 TO 5 STEPS WITH RAILING: TOTAL
TURNING FROM BACK TO SIDE WHILE IN FLAT BAD: A LOT
MOVING TO AND FROM BED TO CHAIR: A LOT
MOVING FROM LYING ON BACK TO SITTING ON SIDE OF FLAT BED WITH BEDRAILS: A LOT
DRESSING REGULAR LOWER BODY CLOTHING: A LOT

## 2023-11-29 ASSESSMENT — PAIN - FUNCTIONAL ASSESSMENT
PAIN_FUNCTIONAL_ASSESSMENT: 0-10
PAIN_FUNCTIONAL_ASSESSMENT: 0-10

## 2023-11-29 ASSESSMENT — PAIN DESCRIPTION - ORIENTATION: ORIENTATION: LOWER

## 2023-11-29 ASSESSMENT — PAIN SCALES - WONG BAKER: WONGBAKER_NUMERICALRESPONSE: HURTS WORST

## 2023-11-29 ASSESSMENT — PAIN DESCRIPTION - LOCATION: LOCATION: BACK

## 2023-11-29 NOTE — PROGRESS NOTES
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 Pang  - s/p short systemic steroid course after RFA as well  Plan:  - spoke with pain mgmt directly, Dr. Pang, and adjusted pain regimen as below  - cont MS Contin 15mg BID, inc Percocet 10mg to Q4H PRN for severe pain, dc IV Dilaudid, PRN Narcan ordered, daily bowel regimen (having Bms)  - cont with increased gabapentin dose of 800mg TID, cont home Lamictal at bedtime for neuropathic pain  - cont increased home Baclofen to 20mg TID, cont Robaxin as well for breakthrough muscle spasms  - cont topical voltaren gel  - will avoid systemic NSAIDs and steroids due to history of gastric pouch ulceration  - PT/OT, dispo will be home with [facility]  - she still feels significant pain with transfers, feels unsafe going home, however feels that with the PRN Percocet increased to Q4H PRN she will be able to manage hopefully     Vte ppx lovenox  Dispo: home with [facility] when ready, hopefully in next 24 hours    Juan Carlos Sagastume MD    SUBJECTIVE     SAYDA. Still having significant pain with transfers. Went a significant amount of time, ~18 hours, without IVP Dilaudid.     OBJECTIVE:     Last Recorded Vitals:  Vitals:    11/28/23 1442 11/28/23 1452 11/28/23 2031 11/29/23 0744   BP: 98/53  111/58 97/55   Patient Position:  "Sitting      Pulse: 85  65 66   Resp: 16  16 15   Temp: 36.5 °C (97.7 °F)  36 °C (96.8 °F) 35.6 °C (96.1 °F)   TempSrc:       SpO2: 96%  99% 97%   Weight:  83.9 kg (184 lb 15.5 oz)     Height:  1.753 m (5' 9.02\")       Last I/O:  I/O last 3 completed shifts:  In: - (0 mL/kg)   Out: 1150 (13.7 mL/kg) [Urine:1150 (0.4 mL/kg/hr)]  Weight: 83.9 kg     Physical Exam  Vitals reviewed.   Constitutional:       General: She is not in acute distress.     Appearance: Normal appearance. She is not toxic-appearing.   HENT:      Head: Normocephalic and atraumatic.   Eyes:      Extraocular Movements: Extraocular movements intact.      Conjunctiva/sclera: Conjunctivae normal.   Cardiovascular:      Rate and Rhythm: Normal rate and regular rhythm.      Pulses: Normal pulses.      Heart sounds: Normal heart sounds.   Pulmonary:      Effort: No respiratory distress.      Breath sounds: Normal breath sounds.   Abdominal:      General: Bowel sounds are normal.      Palpations: Abdomen is soft.      Tenderness: There is no abdominal tenderness.   Musculoskeletal:      Cervical back: Normal range of motion and neck supple.      Comments: Range of motion minimally in the lower extremities.  Full range of motion upper extremities.   Neurological:      Mental Status: She is alert and oriented to person, place, and time. Mental status is at baseline.      Motor: Weakness present.         Inpatient Medications:  baclofen, 20 mg, oral, BID  baclofen, 20 mg, oral, q24h  diclofenac sodium, 4 g, Topical, TID  docusate sodium, 100 mg, oral, BID  enoxaparin, 40 mg, subcutaneous, Daily  gabapentin, 800 mg, oral, q8h ALTON  lamoTRIgine, 400 mg, oral, Nightly  morphine CR, 15 mg, oral, q12h ALTON  nystatin, 1 Application, Topical, BID  omeprazole, 40 mg, oral, BID AC  polyethylene glycol, 17 g, oral, Daily  sucralfate, 1 g, oral, Before meals & nightly    PRN Medications  PRN medications: acetaminophen, albuterol, calcium carbonate, methocarbamol, " "naloxone, ondansetron, oxyCODONE  Continuous Medications:     LABS AND IMAGING:     Labs:              No lab exists for component: \"GFRAA\", \"LABGLOM\", \"LABALBU\"          Imaging:  FL less than 1 hour  These images are not reportable by radiology and will not be interpreted   by  Radiologists.     "

## 2023-11-29 NOTE — CARE PLAN
The patient's goals for the shift include      The clinical goals for the shift include patient will remain safe and will not fall and will start to experience pain control and greater comfort    Over the shift, the patient did not make progress toward the following goals. Barriers to progression include , still rating pain at a 8-10 level. Recommendations to address these barriers include continuing to adjust pain medications.    Problem: Pain - Adult  Goal: Verbalizes/displays adequate comfort level or baseline comfort level  Outcome: Not Progressing     Problem: Pain  Goal: Takes deep breaths with improved pain control throughout the shift  Outcome: Not Progressing  Goal: Turns in bed with improved pain control throughout the shift  Outcome: Not Progressing  Goal: Performs ADL's with improved pain control throughout shift  Outcome: Not Progressing  Goal: Participates in PT with improved pain control throughout the shift  Outcome: Not Progressing  Goal: Free from opioid side effects throughout the shift  Outcome: Not Progressing  Goal: Free from acute confusion related to pain meds throughout the shift  Outcome: Not Progressing

## 2023-11-29 NOTE — PROGRESS NOTES
Occupational Therapy                 Therapy Communication Note    Patient Name: Berta East  MRN: [id number]  Today's Date: 11/29/2023     Discipline: Occupational Therapy    Missed Visit Reason: Missed Visit Reason: Other (Comment) (1130: patient speaking with MD for extended period of time, will reattempt per schedule)

## 2023-11-30 LAB
ANION GAP SERPL CALC-SCNC: 7 MMOL/L (ref 10–20)
BUN SERPL-MCNC: 16 MG/DL (ref 6–23)
CALCIUM SERPL-MCNC: 8.5 MG/DL (ref 8.6–10.3)
CHLORIDE SERPL-SCNC: 106 MMOL/L (ref 98–107)
CO2 SERPL-SCNC: 29 MMOL/L (ref 21–32)
CREAT SERPL-MCNC: 0.47 MG/DL (ref 0.5–1.05)
ERYTHROCYTE [DISTWIDTH] IN BLOOD BY AUTOMATED COUNT: 13.2 % (ref 11.5–14.5)
GFR SERPL CREATININE-BSD FRML MDRD: >90 ML/MIN/1.73M*2
GLUCOSE SERPL-MCNC: 90 MG/DL (ref 74–99)
HCT VFR BLD AUTO: 36.9 % (ref 36–46)
HGB BLD-MCNC: 11.9 G/DL (ref 12–16)
HOLD SPECIMEN: NORMAL
MCH RBC QN AUTO: 29 PG (ref 26–34)
MCHC RBC AUTO-ENTMCNC: 32.2 G/DL (ref 32–36)
MCV RBC AUTO: 90 FL (ref 80–100)
NRBC BLD-RTO: 0 /100 WBCS (ref 0–0)
PLATELET # BLD AUTO: 214 X10*3/UL (ref 150–450)
POTASSIUM SERPL-SCNC: 4 MMOL/L (ref 3.5–5.3)
RBC # BLD AUTO: 4.1 X10*6/UL (ref 4–5.2)
SODIUM SERPL-SCNC: 138 MMOL/L (ref 136–145)
WBC # BLD AUTO: 6.8 X10*3/UL (ref 4.4–11.3)

## 2023-11-30 PROCEDURE — 1210000001 HC SEMI-PRIVATE ROOM DAILY

## 2023-11-30 PROCEDURE — 2500000001 HC RX 250 WO HCPCS SELF ADMINISTERED DRUGS (ALT 637 FOR MEDICARE OP): Performed by: INTERNAL MEDICINE

## 2023-11-30 PROCEDURE — 99232 SBSQ HOSP IP/OBS MODERATE 35: CPT | Performed by: INTERNAL MEDICINE

## 2023-11-30 PROCEDURE — 2500000001 HC RX 250 WO HCPCS SELF ADMINISTERED DRUGS (ALT 637 FOR MEDICARE OP): Performed by: STUDENT IN AN ORGANIZED HEALTH CARE EDUCATION/TRAINING PROGRAM

## 2023-11-30 PROCEDURE — 2500000004 HC RX 250 GENERAL PHARMACY W/ HCPCS (ALT 636 FOR OP/ED): Performed by: INTERNAL MEDICINE

## 2023-11-30 PROCEDURE — 36415 COLL VENOUS BLD VENIPUNCTURE: CPT | Performed by: INTERNAL MEDICINE

## 2023-11-30 PROCEDURE — 85027 COMPLETE CBC AUTOMATED: CPT | Performed by: INTERNAL MEDICINE

## 2023-11-30 PROCEDURE — 2500000004 HC RX 250 GENERAL PHARMACY W/ HCPCS (ALT 636 FOR OP/ED): Performed by: HOSPITALIST

## 2023-11-30 PROCEDURE — 2500000001 HC RX 250 WO HCPCS SELF ADMINISTERED DRUGS (ALT 637 FOR MEDICARE OP): Performed by: HOSPITALIST

## 2023-11-30 PROCEDURE — 80048 BASIC METABOLIC PNL TOTAL CA: CPT | Performed by: INTERNAL MEDICINE

## 2023-11-30 PROCEDURE — 96372 THER/PROPH/DIAG INJ SC/IM: CPT | Performed by: INTERNAL MEDICINE

## 2023-11-30 RX ORDER — HYDROCODONE BITARTRATE AND ACETAMINOPHEN 10; 325 MG/1; MG/1
1 TABLET ORAL ONCE
Status: COMPLETED | OUTPATIENT
Start: 2023-11-30 | End: 2023-11-30

## 2023-11-30 RX ORDER — PROCHLORPERAZINE MALEATE 5 MG
10 TABLET ORAL EVERY 6 HOURS PRN
Status: DISCONTINUED | OUTPATIENT
Start: 2023-11-30 | End: 2023-12-01 | Stop reason: HOSPADM

## 2023-11-30 RX ORDER — PROCHLORPERAZINE 25 MG/1
25 SUPPOSITORY RECTAL EVERY 12 HOURS PRN
Status: DISCONTINUED | OUTPATIENT
Start: 2023-11-30 | End: 2023-12-01 | Stop reason: HOSPADM

## 2023-11-30 RX ORDER — PROCHLORPERAZINE EDISYLATE 5 MG/ML
10 INJECTION INTRAMUSCULAR; INTRAVENOUS EVERY 6 HOURS PRN
Status: DISCONTINUED | OUTPATIENT
Start: 2023-11-30 | End: 2023-12-01 | Stop reason: HOSPADM

## 2023-11-30 RX ORDER — METHOCARBAMOL 500 MG/1
500 TABLET, FILM COATED ORAL EVERY 6 HOURS PRN
Status: DISCONTINUED | OUTPATIENT
Start: 2023-11-30 | End: 2023-12-01 | Stop reason: HOSPADM

## 2023-11-30 RX ORDER — OXYCODONE HCL 10 MG/1
10 TABLET, FILM COATED, EXTENDED RELEASE ORAL EVERY 12 HOURS SCHEDULED
Status: DISCONTINUED | OUTPATIENT
Start: 2023-11-30 | End: 2023-12-01 | Stop reason: HOSPADM

## 2023-11-30 RX ORDER — CYCLOBENZAPRINE HCL 10 MG
5 TABLET ORAL 3 TIMES DAILY PRN
Status: DISCONTINUED | OUTPATIENT
Start: 2023-11-30 | End: 2023-11-30

## 2023-11-30 RX ADMIN — OXYCODONE HYDROCHLORIDE 10 MG: 5 TABLET ORAL at 08:37

## 2023-11-30 RX ADMIN — OXYCODONE HYDROCHLORIDE 10 MG: 5 TABLET ORAL at 23:01

## 2023-11-30 RX ADMIN — HYDROCODONE BITARTRATE AND ACETAMINOPHEN 1 TABLET: 10; 325 TABLET ORAL at 03:41

## 2023-11-30 RX ADMIN — METHOCARBAMOL TABLETS 500 MG: 500 TABLET, COATED ORAL at 03:41

## 2023-11-30 RX ADMIN — DOCUSATE SODIUM 100 MG: 100 CAPSULE, LIQUID FILLED ORAL at 08:37

## 2023-11-30 RX ADMIN — OMEPRAZOLE 40 MG: 20 CAPSULE, DELAYED RELEASE ORAL at 15:55

## 2023-11-30 RX ADMIN — DICLOFENAC SODIUM 1 APPLICATION: 10 GEL TOPICAL at 15:56

## 2023-11-30 RX ADMIN — DICLOFENAC SODIUM 1 APPLICATION: 10 GEL TOPICAL at 08:42

## 2023-11-30 RX ADMIN — BACLOFEN 20 MG: 10 TABLET ORAL at 15:54

## 2023-11-30 RX ADMIN — DICLOFENAC SODIUM 1 APPLICATION: 10 GEL TOPICAL at 20:59

## 2023-11-30 RX ADMIN — NYSTATIN 1 APPLICATION: 100000 POWDER TOPICAL at 20:59

## 2023-11-30 RX ADMIN — SUCRALFATE 1 G: 1 TABLET ORAL at 10:44

## 2023-11-30 RX ADMIN — ENOXAPARIN SODIUM 40 MG: 40 INJECTION SUBCUTANEOUS at 20:52

## 2023-11-30 RX ADMIN — BACLOFEN 20 MG: 10 TABLET ORAL at 20:51

## 2023-11-30 RX ADMIN — OXYCODONE HYDROCHLORIDE 10 MG: 10 TABLET, FILM COATED, EXTENDED RELEASE ORAL at 13:42

## 2023-11-30 RX ADMIN — SUCRALFATE 1 G: 1 TABLET ORAL at 06:24

## 2023-11-30 RX ADMIN — ONDANSETRON 4 MG: 2 INJECTION INTRAMUSCULAR; INTRAVENOUS at 01:06

## 2023-11-30 RX ADMIN — OXYCODONE HYDROCHLORIDE 10 MG: 5 TABLET ORAL at 15:55

## 2023-11-30 RX ADMIN — BACLOFEN 20 MG: 10 TABLET ORAL at 08:37

## 2023-11-30 RX ADMIN — POLYETHYLENE GLYCOL 3350 17 G: 17 POWDER, FOR SOLUTION ORAL at 08:38

## 2023-11-30 RX ADMIN — OXYCODONE HYDROCHLORIDE 10 MG: 10 TABLET, FILM COATED, EXTENDED RELEASE ORAL at 20:51

## 2023-11-30 RX ADMIN — GABAPENTIN 800 MG: 400 CAPSULE ORAL at 20:59

## 2023-11-30 RX ADMIN — GABAPENTIN 800 MG: 400 CAPSULE ORAL at 06:24

## 2023-11-30 RX ADMIN — PROCHLORPERAZINE EDISYLATE 10 MG: 5 INJECTION INTRAMUSCULAR; INTRAVENOUS at 03:45

## 2023-11-30 RX ADMIN — SUCRALFATE 1 G: 1 TABLET ORAL at 20:51

## 2023-11-30 RX ADMIN — DOCUSATE SODIUM 100 MG: 100 CAPSULE, LIQUID FILLED ORAL at 20:52

## 2023-11-30 RX ADMIN — SUCRALFATE 1 G: 1 TABLET ORAL at 15:54

## 2023-11-30 RX ADMIN — OMEPRAZOLE 40 MG: 20 CAPSULE, DELAYED RELEASE ORAL at 06:24

## 2023-11-30 RX ADMIN — NYSTATIN 1 APPLICATION: 100000 POWDER TOPICAL at 08:41

## 2023-11-30 RX ADMIN — GABAPENTIN 800 MG: 400 CAPSULE ORAL at 13:42

## 2023-11-30 RX ADMIN — LAMOTRIGINE 400 MG: 100 TABLET ORAL at 20:52

## 2023-11-30 ASSESSMENT — COGNITIVE AND FUNCTIONAL STATUS - GENERAL
PERSONAL GROOMING: A LITTLE
WALKING IN HOSPITAL ROOM: TOTAL
STANDING UP FROM CHAIR USING ARMS: TOTAL
HELP NEEDED FOR BATHING: A LOT
DAILY ACTIVITIY SCORE: 15
DRESSING REGULAR UPPER BODY CLOTHING: A LOT
DRESSING REGULAR LOWER BODY CLOTHING: A LOT
TURNING FROM BACK TO SIDE WHILE IN FLAT BAD: A LOT
MOVING FROM LYING ON BACK TO SITTING ON SIDE OF FLAT BED WITH BEDRAILS: A LOT
TOILETING: A LOT
MOBILITY SCORE: 9
MOVING TO AND FROM BED TO CHAIR: A LOT
CLIMB 3 TO 5 STEPS WITH RAILING: TOTAL

## 2023-11-30 ASSESSMENT — PAIN SCALES - GENERAL: PAINLEVEL_OUTOF10: 9

## 2023-11-30 ASSESSMENT — PAIN - FUNCTIONAL ASSESSMENT: PAIN_FUNCTIONAL_ASSESSMENT: 0-10

## 2023-11-30 NOTE — PROGRESS NOTES
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 Pang  - s/p short systemic steroid course after RFA as well  Plan:  - spoke with pain mgmt directly, Dr. Pang, and adjusted pain regimen as below  - dc MS Contin due to rash  - start Oxycontin 10mg BID for long acting, cont Percocet 10mg Q4H PRN for severe pain, dc IV Dilaudid, PRN Narcan ordered, daily bowel regimen (having Bms)  - cont with increased gabapentin dose of 800mg TID, cont home Lamictal at bedtime for neuropathic pain  - cont increased home Baclofen to 20mg TID, cont Robaxin as well for breakthrough muscle spasms  - cont topical voltaren gel  - will avoid systemic NSAIDs and steroids due to history of gastric pouch ulceration  - PT/OT, dispo will be home with [facility]  - she still feels significant pain with transfers, feels unsafe going home, however was almost ready with long acting opioid added, however developed rash with morphine, will change to long acting oxycodone as substitute,     Vte ppx lovenox  Dispo: home with [facility] when ready, hopefully in next 24 hours    Juan Carlos Sagastume MD    SUBJECTIVE     Overnight still with significant pain. This morning feeling somewhat better. The long acting morphine was helping, but she says she noticed rash with hives and itching all over after getting  it. Will dc.     OBJECTIVE:     Last Recorded Vitals:  Vitals:    11/29/23 0744 11/29/23 1939 11/30/23 0013 11/30/23 0747   BP: 97/55 112/56 110/70 98/50   Patient Position:  Sitting     Pulse: 66 74 83 58   Resp: 15 19 16    Temp: 35.6 °C (96.1 °F) 35.8 °C (96.4 °F) 35.8 °C (96.4 °F) 35.2 °C (95.4 °F)   TempSrc:       SpO2: 97% 98% 99% 95%   Weight:       Height:         Last I/O:  I/O last 3 completed shifts:  In: 250 (3 mL/kg) [P.O.:250]  Out: - (0 mL/kg)   Weight: 83.9 kg     Physical Exam  Vitals reviewed.   Constitutional:       General: She is not in acute distress.     Appearance: Normal appearance. She is not toxic-appearing.   HENT:      Head: Normocephalic and atraumatic.   Eyes:      Extraocular Movements: Extraocular movements intact.      Conjunctiva/sclera: Conjunctivae normal.   Cardiovascular:      Rate and Rhythm: Normal rate and regular rhythm.      Pulses: Normal pulses.      Heart sounds: Normal heart sounds.   Pulmonary:      Effort: No respiratory distress.      Breath sounds: Normal breath sounds.   Abdominal:      General: Bowel sounds are normal.      Palpations: Abdomen is soft.      Tenderness: There is no abdominal tenderness.   Musculoskeletal:      Cervical back: Normal range of motion and neck supple.      Comments: Range of motion minimally in the lower extremities.  Full range of motion upper extremities.   Neurological:      Mental Status: She is alert and oriented to person, place, and time. Mental status is at baseline.      Motor: Weakness present.         Inpatient Medications:  baclofen, 20 mg, oral, BID  baclofen, 20 mg, oral, q24h  diclofenac sodium, 4 g, Topical, TID  docusate sodium, 100 mg, oral, BID  enoxaparin, 40 mg, subcutaneous, Daily  gabapentin, 800 mg, oral, q8h ALTON  lamoTRIgine, 400 mg, oral, Nightly  nystatin, 1 Application, Topical, BID  omeprazole, 40 mg, oral, BID AC  oxyCODONE ER, 10 mg, oral, q12h ALTON  polyethylene glycol, 17 g, oral, Daily  sucralfate, 1  g, oral, Before meals & nightly    PRN Medications  PRN medications: acetaminophen, albuterol, calcium carbonate, cyclobenzaprine, naloxone, ondansetron, oxyCODONE, prochlorperazine **OR** prochlorperazine **OR** prochlorperazine  Continuous Medications:     LABS AND IMAGING:     Labs:  Results from last 7 days   Lab Units 11/30/23  0845   WBC AUTO x10*3/uL 6.8   RBC AUTO x10*6/uL 4.10   HEMOGLOBIN g/dL 11.9*   HEMATOCRIT % 36.9   MCV fL 90   MCH pg 29.0   MCHC g/dL 32.2   RDW % 13.2   PLATELETS AUTO x10*3/uL 214       Results from last 7 days   Lab Units 11/30/23  0845   SODIUM mmol/L 138   POTASSIUM mmol/L 4.0   CHLORIDE mmol/L 106   CO2 mmol/L 29   BUN mg/dL 16   CREATININE mg/dL 0.47*   GLUCOSE mg/dL 90   CALCIUM mg/dL 8.5*             Imaging:  FL less than 1 hour  These images are not reportable by radiology and will not be interpreted   by  Radiologists.

## 2023-11-30 NOTE — NURSING NOTE
"RN at bedside. Pt stating in 9/10 pain, and also verbalizing that robaxin makes her nauseated. There are no other prns due at this time. RN contacted Dr. Parra regarding something for pain, per previous provider notes, plan is to keep patient on po in anticipation for discharge. Dr. Parra put in an order for Norco. RN went to discuss the [place] and pt stated \"get the f____ out of my room with that\". Dr. Parra notified.   "
Updated Dr Sagastume regarding pt pain new orders received.   
none

## 2023-11-30 NOTE — PROGRESS NOTES
Occupational Therapy                 Therapy Communication Note    Patient Name: Berta East  MRN: [id number]  Today's Date: 11/30/2023     Discipline: Occupational Therapy    Missed Visit Reason: Missed Visit Reason: Patient refused (pt refused stating she is in too much pain, RN present)    Missed Time: Attempt 1415    Comment:

## 2023-12-01 VITALS
SYSTOLIC BLOOD PRESSURE: 95 MMHG | WEIGHT: 184.97 LBS | HEART RATE: 81 BPM | TEMPERATURE: 98.1 F | BODY MASS INDEX: 27.4 KG/M2 | DIASTOLIC BLOOD PRESSURE: 51 MMHG | HEIGHT: 69 IN | RESPIRATION RATE: 12 BRPM | OXYGEN SATURATION: 97 %

## 2023-12-01 PROCEDURE — 99239 HOSP IP/OBS DSCHRG MGMT >30: CPT | Performed by: INTERNAL MEDICINE

## 2023-12-01 PROCEDURE — 2500000004 HC RX 250 GENERAL PHARMACY W/ HCPCS (ALT 636 FOR OP/ED): Performed by: HOSPITALIST

## 2023-12-01 PROCEDURE — 2500000001 HC RX 250 WO HCPCS SELF ADMINISTERED DRUGS (ALT 637 FOR MEDICARE OP): Performed by: HOSPITALIST

## 2023-12-01 PROCEDURE — 2500000001 HC RX 250 WO HCPCS SELF ADMINISTERED DRUGS (ALT 637 FOR MEDICARE OP): Performed by: STUDENT IN AN ORGANIZED HEALTH CARE EDUCATION/TRAINING PROGRAM

## 2023-12-01 PROCEDURE — 2500000004 HC RX 250 GENERAL PHARMACY W/ HCPCS (ALT 636 FOR OP/ED): Performed by: INTERNAL MEDICINE

## 2023-12-01 PROCEDURE — 2500000001 HC RX 250 WO HCPCS SELF ADMINISTERED DRUGS (ALT 637 FOR MEDICARE OP): Performed by: INTERNAL MEDICINE

## 2023-12-01 RX ORDER — OXYCODONE AND ACETAMINOPHEN 10; 325 MG/1; MG/1
1 TABLET ORAL EVERY 4 HOURS PRN
Qty: 5 TABLET | Refills: 0
Start: 2023-12-01

## 2023-12-01 RX ORDER — OXYCODONE HCL 10 MG/1
10 TABLET, FILM COATED, EXTENDED RELEASE ORAL EVERY 12 HOURS SCHEDULED
Qty: 9 TABLET | Refills: 0 | Status: SHIPPED | OUTPATIENT
Start: 2023-12-01

## 2023-12-01 RX ORDER — LAMOTRIGINE 200 MG/1
400 TABLET ORAL NIGHTLY
Start: 2023-12-01

## 2023-12-01 RX ORDER — GABAPENTIN 400 MG/1
800 CAPSULE ORAL EVERY 8 HOURS SCHEDULED
Qty: 180 CAPSULE | Refills: 0 | Status: SHIPPED | OUTPATIENT
Start: 2023-12-01 | End: 2023-12-31

## 2023-12-01 RX ORDER — FAMOTIDINE 40 MG/1
40 TABLET, FILM COATED ORAL NIGHTLY
Start: 2023-12-01

## 2023-12-01 RX ORDER — NALOXONE HYDROCHLORIDE 4 MG/.1ML
4 SPRAY NASAL AS NEEDED
Qty: 2 EACH | Refills: 11 | Status: SHIPPED | OUTPATIENT
Start: 2023-12-01

## 2023-12-01 RX ORDER — DICLOFENAC SODIUM 10 MG/G
4 GEL TOPICAL 3 TIMES DAILY
Start: 2023-12-01

## 2023-12-01 RX ORDER — METHOCARBAMOL 500 MG/1
500 TABLET, FILM COATED ORAL EVERY 6 HOURS PRN
Qty: 30 TABLET | Refills: 0 | Status: SHIPPED | OUTPATIENT
Start: 2023-12-01

## 2023-12-01 RX ORDER — ONDANSETRON 4 MG/1
4 TABLET, FILM COATED ORAL EVERY 6 HOURS PRN
Qty: 30 TABLET | Refills: 0 | Status: SHIPPED | OUTPATIENT
Start: 2023-12-01

## 2023-12-01 RX ADMIN — SUCRALFATE 1 G: 1 TABLET ORAL at 06:25

## 2023-12-01 RX ADMIN — NYSTATIN 1 APPLICATION: 100000 POWDER TOPICAL at 08:31

## 2023-12-01 RX ADMIN — GABAPENTIN 800 MG: 400 CAPSULE ORAL at 06:25

## 2023-12-01 RX ADMIN — DOCUSATE SODIUM 100 MG: 100 CAPSULE, LIQUID FILLED ORAL at 08:30

## 2023-12-01 RX ADMIN — BACLOFEN 20 MG: 10 TABLET ORAL at 08:30

## 2023-12-01 RX ADMIN — METHOCARBAMOL TABLETS 500 MG: 500 TABLET, COATED ORAL at 08:35

## 2023-12-01 RX ADMIN — OXYCODONE HYDROCHLORIDE 10 MG: 5 TABLET ORAL at 03:13

## 2023-12-01 RX ADMIN — OMEPRAZOLE 40 MG: 20 CAPSULE, DELAYED RELEASE ORAL at 06:25

## 2023-12-01 RX ADMIN — POLYETHYLENE GLYCOL 3350 17 G: 17 POWDER, FOR SOLUTION ORAL at 08:31

## 2023-12-01 RX ADMIN — SUCRALFATE 1 G: 1 TABLET ORAL at 11:02

## 2023-12-01 RX ADMIN — OXYCODONE HYDROCHLORIDE 10 MG: 5 TABLET ORAL at 11:02

## 2023-12-01 RX ADMIN — OXYCODONE HYDROCHLORIDE 10 MG: 10 TABLET, FILM COATED, EXTENDED RELEASE ORAL at 08:31

## 2023-12-01 ASSESSMENT — PAIN SCALES - GENERAL: PAINLEVEL_OUTOF10: 8

## 2023-12-01 NOTE — DISCHARGE SUMMARY
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 Pang  - s/p short systemic steroid course after RFA as well this admission  Plan:  - spoke with pain mgmt directly, Dr. Pang, and adjusted pain regimen as below  - dc MS Contin due to rash  - start Oxycontin 10mg BID for long acting (sent short supply, has upcoming appointment with pain mgmt), cont Percocet 10mg Q4H PRN for severe pain (already has this at home)  - cont with increased gabapentin dose of 800mg TID (sent new Rx for this), cont home Lamictal at bedtime for neuropathic pain  - cont increased home Baclofen to 20mg TID, cont Robaxin as well for breakthrough muscle spasms (sent Rx for Robaxin)  - cont topical voltaren gel  - will avoid systemic NSAIDs and steroids due to history of gastric pouch ulceration  - PT/OT, dispo will be home with [facility]  - stable for dc to home today with [facility]  - has upcoming appt with pain management physician, will also get PCP appt     Stable for dc to home with [facility]. Total time spent on discharge services 31 minutes.     DISCHARGE PHYSICAL EXAM     Last Recorded Vitals:  Vitals:     11/30/23 0747 11/30/23 1449 11/30/23 1918 12/01/23 0801   BP: 98/50 120/57 117/62 95/51   Pulse: 58 81 81    Resp:    12   Temp: 35.2 °C (95.4 °F) 35.9 °C (96.6 °F) 36.6 °C (97.9 °F) 36.7 °C (98.1 °F)   TempSrc:       SpO2: 95% 97% 99% 97%   Weight:       Height:           Physical Exam:  Vitals reviewed.   Constitutional:       General: She is not in acute distress.     Appearance: Normal appearance. She is not toxic-appearing.   HENT:      Head: Normocephalic and atraumatic.   Eyes:      Extraocular Movements: Extraocular movements intact.      Conjunctiva/sclera: Conjunctivae normal.   Cardiovascular:      Rate and Rhythm: Normal rate and regular rhythm.      Pulses: Normal pulses.      Heart sounds: Normal heart sounds.   Pulmonary:      Effort: No respiratory distress.      Breath sounds: Normal breath sounds.   Abdominal:      General: Bowel sounds are normal.      Palpations: Abdomen is soft.      Tenderness: There is no abdominal tenderness.   Musculoskeletal:      Cervical back: Normal range of motion and neck supple.      Comments: Range of motion minimally in the lower extremities.  Full range of motion upper extremities.   Neurological:      Mental Status: She is alert and oriented to person, place, and time. Mental status is at baseline.      Motor: Weakness present.       PERTINENT LABS AND IMAGING     Results for orders placed or performed during the hospital encounter of 11/18/23 (from the past 96 hour(s))   Basic metabolic panel   Result Value Ref Range    Glucose 90 74 - 99 mg/dL    Sodium 138 136 - 145 mmol/L    Potassium 4.0 3.5 - 5.3 mmol/L    Chloride 106 98 - 107 mmol/L    Bicarbonate 29 21 - 32 mmol/L    Anion Gap 7 (L) 10 - 20 mmol/L    Urea Nitrogen 16 6 - 23 mg/dL    Creatinine 0.47 (L) 0.50 - 1.05 mg/dL    eGFR >90 >60 mL/min/1.73m*2    Calcium 8.5 (L) 8.6 - 10.3 mg/dL   CBC   Result Value Ref Range    WBC 6.8 4.4 - 11.3 x10*3/uL    nRBC 0.0 0.0 - 0.0 /100 WBCs    RBC 4.10 4.00 - 5.20  x10*6/uL    Hemoglobin 11.9 (L) 12.0 - 16.0 g/dL    Hematocrit 36.9 36.0 - 46.0 %    MCV 90 80 - 100 fL    MCH 29.0 26.0 - 34.0 pg    MCHC 32.2 32.0 - 36.0 g/dL    RDW 13.2 11.5 - 14.5 %    Platelets 214 150 - 450 x10*3/uL   SST TOP   Result Value Ref Range    Extra Tube Hold for add-ons.         FL less than 1 hour   Final Result      CT thoracic spine wo IV contrast   Final Result   No evidence of acute abnormality in the chest, abdomen or pelvis.        No acute thoracic or lumbar spine fracture or malalignment.        Hepatomegaly.        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 There is   a serpiginous tubular tract of hypoattenuation between the proximal   aspects of suture lines of the gastric pouch and excluded stomach.   There is also marked distention of the excluded stomach. A small   locule of air was seen interposed between the excluded stomach and   gastric pouch in this region on the prior exam. 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 Further   evaluation with nonemergent outpatient fluoroscopic upper GI exam is   suggested. A yellow alert was sent.        IUD noted in the uterus.        Incidental 4 mm nodules in the apical lungs, probably   postinflammatory in etiology given their small size and patient age.   Suggest attention on follow-up.        Additional findings as discussed above.        MACRO:   Critical Finding:  See findings. Notification was initiated on   11/18/2023 at 10:38 pm by  Tomas Wesley.  (**-YCF-**) Instructions:        Signed by: Tomas Wesley 11/18/2023 10:40 PM   Dictation workstation:   [id number]      CT lumbar spine wo IV contrast   Final Result   No evidence of acute abnormality in the chest, abdomen or pelvis.        No acute thoracic or lumbar spine fracture or malalignment.        Hepatomegaly.        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 There is   a serpiginous tubular tract of  hypoattenuation between the proximal   aspects of suture lines of the gastric pouch and excluded stomach.   There is also marked distention of the excluded stomach. A small   locule of air was seen interposed between the excluded stomach and   gastric pouch in this region on the prior exam. 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 Further   evaluation with nonemergent outpatient fluoroscopic upper GI exam is   suggested. A yellow alert was sent.        IUD noted in the uterus.        Incidental 4 mm nodules in the apical lungs, probably   postinflammatory in etiology given their small size and patient age.   Suggest attention on follow-up.        Additional findings as discussed above.        MACRO:   Critical Finding:  See findings. Notification was initiated on   11/18/2023 at 10:38 pm by  Tomas Wesley.  (**-YCF-**) Instructions:        Signed by: Tomas Wesley 11/18/2023 10:40 PM   Dictation workstation:   [id number]      CT chest abdomen pelvis w IV contrast   Final Result   No evidence of acute abnormality in the chest, abdomen or pelvis.        No acute thoracic or lumbar spine fracture or malalignment.        Hepatomegaly.        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 There is   a serpiginous tubular tract of hypoattenuation between the proximal   aspects of suture lines of the gastric pouch and excluded stomach.   There is also marked distention of the excluded stomach. A small   locule of air was seen interposed between the excluded stomach and   gastric pouch in this region on the prior exam. 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 Further   evaluation with nonemergent outpatient fluoroscopic upper GI exam is   suggested. A yellow alert was sent.        IUD noted in the uterus.        Incidental 4 mm nodules in the apical lungs, probably   postinflammatory in etiology given their small size and patient age.    Suggest attention on follow-up.        Additional findings as discussed above.        MACRO:   Critical Finding:  See findings. Notification was initiated on   11/18/2023 at 10:38 pm by  Tomas Wesley.  (**-YCF-**) Instructions:        Signed by: Tomas Wesley 11/18/2023 10:40 PM   Dictation workstation:   [id number]          No echocardiogram results found for the past 14 days    DISCHARGE MEDICATIONS        Your medication list        START taking these medications        Instructions Last Dose Given Next Dose Due   methocarbamol 500 mg tablet  Commonly known as: Robaxin      Take 1 tablet (500 mg) by mouth every 6 hours if needed for muscle spasms.       ondansetron 4 mg tablet  Commonly known as: Zofran      Take 1 tablet (4 mg) by mouth every 6 hours if needed for nausea or vomiting.       oxyCODONE ER 10 mg 12 hr tablet  Commonly known as: OxyCONTIN      Take 1 tablet (10 mg) by mouth every 12 hours. Do not crush, chew, or split.              CHANGE how you take these medications        Instructions Last Dose Given Next Dose Due   gabapentin 400 mg capsule  Commonly known as: Neurontin      Take 2 capsules (800 mg) by mouth every 8 hours.              CONTINUE taking these medications        Instructions Last Dose Given Next Dose Due   baclofen 20 mg tablet  Commonly known as: Lioresal           baclofen 10 mg tablet  Commonly known as: Lioresal           diclofenac sodium 1 % gel gel  Commonly known as: Voltaren      Apply 1 Application topically 3 times a day.       famotidine 40 mg tablet  Commonly known as: Pepcid      Take 1 tablet (40 mg) by mouth once daily at bedtime.       lamoTRIgine 200 mg tablet  Commonly known as: LaMICtal      Take 2 tablets (400 mg) by mouth once daily at bedtime.       omeprazole 40 mg DR capsule  Commonly known as: PriLOSEC           oxyCODONE-acetaminophen  mg tablet  Commonly known as: Percocet      Take 1 tablet by mouth every 4 hours if needed for severe pain (7 -  10).       sucralfate 1 gram tablet  Commonly known as: Carafate                     Where to Get Your Medications        These medications were sent to [id number] - [facility], [place] - [address]  [address] [facility] [address]      Phone: [phone]   gabapentin 400 mg capsule  methocarbamol 500 mg tablet  ondansetron 4 mg tablet  oxyCODONE ER 10 mg 12 hr tablet       Information about where to get these medications is not yet available    Ask your nurse or doctor about these medications  diclofenac sodium 1 % gel gel  famotidine 40 mg tablet  lamoTRIgine 200 mg tablet  oxyCODONE-acetaminophen  mg tablet         OUTPATIENT FOLLOW-UP     No future appointments.

## 2023-12-01 NOTE — PROGRESS NOTES
"Nutrition Follow-up Note  Nutrition Note       Nutrition Assessment        Energy Intake: Good > 75 %  Food and Nutrient History: Pt reports she is still eating a lot of fruit but including other things in her meals as well. Has been drinking at least 2 ensure per day but has a hard time getting in 3 per day. Will reduce ensure order to BID but pt may request a third ensure if she feels she needs it. Still with nausea on and off. Still taking stool softeners to prevent constipation.  Vitamin/Herbal Supplement Use: none listed in home med list    PMH, meds, and labs reviewed.  Dietary Orders (From admission, onward)       Start     Ordered    12/01/23 1200  Oral nutritional supplements  Until discontinued        Comments: Can sub regular ensure if high protein is not available. Chocolate flavor.   Question Answer Comment   Deliver with Breakfast    Deliver with Dinner    Select supplement: Ensure High Protein        12/01/23 1200    11/19/23 0103  Adult diet Regular  Diet effective now        Question:  Diet type  Answer:  Regular    11/19/23 0102 11/19/23 0057  May Participate in Room Service  Once        Question:  .  Answer:  Yes    11/19/23 0057                  Independent    GI per flowsheet:  Gastrointestinal  Gastrointestinal (WDL): Within Defined Limits  Bowel Sounds: All quadrants  Bowel Sounds (All Quadrants): Active  Last bowel movement documented: 11/28/23  Allergies: Morphine     Anthropometrics:  Height: 175.3 cm (5' 9.02\")  Weight: 83.9 kg (184 lb 15.5 oz)  BMI (Calculated): 27.3  IBW: 65.9 kg  ABW:       Weight History / % Weight Change: No new wt to assess  Significant Weight Loss: No    Estimated Nutritional Needs:  Calculated Energy Needs Using Equations  Height: 175.3 cm (5' 9.02\")  Temp: 36.7 °C (98.1 °F)    Total Energy Estimated Needs (kCal): 2100 kCal  Total Estimated Energy Need per Day (kCal/kg): 25 kCal/kg  Method for Estimating Needs: ABW    Total Protein Estimated Needs (g): 67 " g  Total Protein Estimated Needs (g/kg): 0.8 g/kg  Method for Estimating Needs: ABW    Total Fluid Estimated Needs (mL): 2517 mL  Total Fluid Estimated Needs (mL/kg): 30 mL/kg  Method for Estimating Needs: ABW    Nutrition Focused Physical Findings:   Orbital Fat Pads: Defer (not indicated)         Edema  Edema: none    Skin: Positive (lower back wound per nursing assessment)  Pain Score: 8     Nutrition Diagnosis   Malnutrition Diagnosis  Patient has Malnutrition Diagnosis: No    Patient has Nutrition Diagnosis: Yes  Diagnosis Status (1): Resolved  Nutrition Diagnosis 1: Inadequate protein intake  Related to (1): pt consuming mostly carbohydrate food items  As Evidenced by (1): pt reports of eating mostly fruit and little else at meals  Additional Assessment Information (1): Pt now consuming ensure and able to meet protein needs                                      Nutrition Interventions/Recommendations    Individualized Nutrition Prescription Provided for : Ensure High Protein BID (160 kcal and 16 g protein per serving).  Interventions: Meals and snacks, Medical food supplement  Medical Food Supplement: Commercial beverage       Nutrition Monitoring and Evaluation    Food/Nutrient Related History Monitoring  Monitoring and Evaluation Plan: Energy intake, Amount of food  Criteria: Pt meets >75% of estimated energy needs  Criteria: Pt consumes >75% of meals and supplements  Body Composition/Growth/Weight History  Monitoring and Evaluation Plan: Weight  Criteria: Maintains stable weight  Biochemical Data, Medical Tests and Procedures  Monitoring and Evaluation Plan: Glucose/endocrine profile  Glucose/Endocrine Profile: Glucose, casual  Criteria: BG within desirable range      Education Documentation  No documentation found.    Patient with no diet related questions at this time.              Time Spent (min): 30 minutes  Last Date of Nutrition Visit: 12/01/23  Nutrition Follow-Up Needed?: 5-7 days

## 2024-02-07 ENCOUNTER — OFFICE VISIT (OUTPATIENT)
Dept: ORTHOPEDIC SURGERY | Facility: CLINIC | Age: 24
End: 2024-02-07
Payer: MEDICARE

## 2024-02-07 ENCOUNTER — CLINICAL SUPPORT (OUTPATIENT)
Dept: ORTHOPEDIC SURGERY | Facility: CLINIC | Age: 24
End: 2024-02-07
Payer: MEDICARE

## 2024-02-07 DIAGNOSIS — M25.551 HIP PAIN, ACUTE, RIGHT: Primary | ICD-10-CM

## 2024-02-07 DIAGNOSIS — M25.551 HIP PAIN, ACUTE, RIGHT: ICD-10-CM

## 2024-02-07 PROCEDURE — 1036F TOBACCO NON-USER: CPT | Performed by: STUDENT IN AN ORGANIZED HEALTH CARE EDUCATION/TRAINING PROGRAM

## 2024-02-07 PROCEDURE — 73502 X-RAY EXAM HIP UNI 2-3 VIEWS: CPT | Mod: RIGHT SIDE | Performed by: RADIOLOGY

## 2024-02-07 PROCEDURE — 99213 OFFICE O/P EST LOW 20 MIN: CPT | Performed by: STUDENT IN AN ORGANIZED HEALTH CARE EDUCATION/TRAINING PROGRAM

## 2024-02-07 NOTE — PROGRESS NOTES
Seen for new Right hip pain.  Pt of Dr Manas Russell who was here today supervising all of encounter.     IMPRESSION:  New problem - acute exacerbation of Right hip[/groin pain probably aggravation/extension of hip labral tear.   Friedreich's ataxia with progressive weakness, truncal instability and wheelchair dependence  Scheuermann's kyphosis   Lumbar spondylosis with documented grade 1 L5 anterolisthesis, and lumbar disc protrusion at L4 and L5  Axial pain at cervicothoracic junction is probably mechanical  Prior injections with Dr. Johnson were not in HIE but diagnosed under spondylosis so probably MBB  Annular tear noted at L4-L5 disc on Lumbar MRI from June 8, 2021   now s/p b/l Medial branch RFA at [facility] (while inpatient) ?L2-3-4-5 ~Nov 2023    RECOMMENDATIONS:  - new right hip xrays today  personally interpreted look unremarkable, except mild cam deformity on right.    - diagnostic US done with Dr Russell at time of injection shows moderate anterior labral degeneration, small hip joint effusion, no iliopsoas bursitis but tender over joint.  No hyperemia noted thru all of anterior hip.   - Procedure:  Ultrasound guided Right hip sublabral injection  The risks, benefits and anticipated outcomes of the procedure, the risks and benefits of the alternatives to the procedure, and the roles and tasks of the personnel to be involved, were discussed with the patient, and the patient consents to the procedure and agrees to proceed.  UNIVERSAL PROTOCOL / SAFETY CHECKLIST  Sign in Communication: Completed  Time Out:  Team Confirms the Correct Patient, Correct Procedure, Correct Site and Site Marking, Correct Position (if applicable), Prep and Dry Time (if applicable).   Affirmation of Time Out: YES  Sign Out Discussion: Completed if applicable  The procedure was carried out under sterile prep  with sterile gel.  A  27  ga 1.25  inch needle was used for local anesthesia with 3 cc of Lidocaine.  Then a 20 ga 3.5 inch  needle was advanced with ultrasound guidance to the anterior hip joint deep to the labrum at the superior femoral head.  Following negative aspiration,  a mixture of 2 cc of lidocaine and 1 cc of Depomedrol (40mg/ml) was injected .  Ultrasound interpretation was performed prior to the procedure to identify the target and any adjacent neurovascular structures.  Subsequently, interpretation was performed during real-time needle guidance confirming placement.  Post-intervention interpretation was also performed confirming appropriate injectate flow and hemostasis. The patient tolerated the procedure without complication and was instructed in post-procedure precautions.    - Still ? Needs to followup with  wheelchair seating clinic at [facility],  ?if she got relief built-in for her thoracic spinous process, -to schedule repeat BL L5 TFESI if LBP gets worse again.  with 4mg IV versed and depo 40 mg each side  Discussed eventually repeat RFA but try to wait at least 1 year from last Nov .   Also maybe consider Sprint PNS implant or orthobioligics?  Maybe more lumbar bracing?  -Continue taking oxycodone 10mg QID. Per PCP/Pain.   Continue diclofenac gel prn. But I sent a new prescription for diclofenac drops can be applied to any region, may be helpful for the mid thoracic, continue lidocaine patch  - follow up ~6wk        Ready to learn, no apparent learning barriers. Education provided on treatment plan according to patient's preferred learning style. Patient verbalizes understanding.    Katarzyna Thayer DO (MSK/Spine fellow)            CC: Patient complains of low back pain    Here with her mother who helps with history .   Dr Russell did MBB for L5-S1 facets 6/12/23 with good partial relief 8/10 down to 2/10 that day but had a lot of versed then...  Then Bilateral L5 TFESI 7/10/24 with good relief for ~2-3mo but LPB even worse so severe she went to ED in [place] by squad a few times in Oct/Nov and  eventually had bilateral L3,4 ? RFA by pain mgmt there while in hosptial.  CT chest abd pelvis and Lumbar cofirmed moderate facet arthropathy b/l L4-5 and L5-S1.      ? Onoging thoracic pain, started around the same time in March, she did have a fall during transfer about 2 weeks before the epidural injection, with right Overall Rates pain 9/10. Still taking percocet 10mg QID as per PCP? pain management. Started lamotrigine for neuropathic pain but now is back to just taking gabapentin and baclofen, topical diclofenac. Has tried nitroglycerin for pain.      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 She also has history of obesity's status post bariatric surgery in 2019 with 180 pounds of weight loss. She complains of many years of low back pain attributed to her weight and wheelchair use, has a history of Shermans kyphosis and intermittent thoracic pain     Patient reports no fevers, chills, sweats, night pain, cancer history no bowel or bladder disturbance .  She does endorse mood problems, sleep disturbance muscle cramping and stomach problems.      Pertinent Physical Exam (see remainder below):  MSK: Sitting posture is only mildly slouching, new weelchair fits better , leg bolsters are better closer to lateral thighs. Still  mild kyphosis, with a prominent thoracic spinous process around to the 10, focally tender and mild skin induration/discoloration in this region. Some difficulty with trunk control, and stands with assist from her mom that increases her pain. POS tenderness bilateral paraspinals and midline both in the lower lumbar spine, and the lower cervical/upper thoracic, but minimal midline tenderness. Range of motion is not fully testable because of her weakness, but cervical moves well,   Neuro: Trace weakness in bilateral finger extensors, 4/5 and hand intrinsics, otherwise normal strength in upper  extremities, Quads 3/5 on Right (pain limited) and 3/5 on left clearly weaker on Left , ADF 2/5, PF is still 4+/5 with manual testing except toe flexors 4/5. Straight leg raise negative sitting   Rt Hip Exam: Mildly reduced ROM, mildly diffusely tender except focally more over anterior joint, Lacey HILARIO, tests pos with typical pain,  Gait Not testable, POS impingement signs both seated and supine (exam done on xray table)      ____________________________________________________________________     SUPPORTING DOCUMENTATION (remaining history, exam, other findings):     Work-up reviewed - this has included MRi Lsp at [facility] 2020- and MRI Lsp at             June 8th 2021     L3 -- 4: Diffuse disc bulge. Patent central canal. Patent bilateral   neural foramina.      L4 -- 5: Diffuse disc bulge. Facet osteoarthritis. Patent central canal. Patent bilateral neural foramina.      posterior annular tear  L5 -- S1: Diffuse disc bulge, significant facet osteoarthritis, grade 1anterior listhesis of L5 over S1 vertebral body. Patent central canal.        Severe bilateral foramina narrowing.                                                         -thoracic and right hip x-rays May '23 show wedging of T10 and T11 vertebrae, with prominent spinous process/gap between adjacent, hip x-rays look normal but do show lumbar facet arthropathy L5-S1  MRI Tspine - chronic wedging T10/11 and multilevel DDD c/w scheuermans   CT Lsp 11/18/23 - Mod deg facet changes Lower lumbar   Had BL L5 TFESI on 8/24/2022 -: had 60% relief for 4-5 months but repeat bilateral L5 transforaminal March 13 seem to help last, especially the right side     Treatment has included spine injections Dr Martinez and me 10/25/21 helped GREATLY - almost no pain x 1 mo and lasted about 1.5 months then gradually returned.  And per above  Dr Russell did MBB for L5-S1 facets 6/12/23 with good partial relief 8/10 down to 2/10 that day but had a lot of versed then...   Then Bilateral L5 TFESI 7/10/24 with good relief for ~2-3mo but LPB even worse so severe she went to ED in [place] by squad a few times in Oct/Nov and eventually had bilateral L3,4 ? RFA by pain mgmt there while in hosptial  (fluoro images look like MBB b/ L2,3,4?5~11/20/23  See above for Assessment and Plan    L Inj/Asp: R hip joint on 2/8/2024 2:14 PM  Indications: pain  Details: 20 G needle, ultrasound-guided anterolateral approach  Medications: 40 mg methylPREDNISolone acetate 40 mg/mL; 3 mL lidocaine PF 10 mg/mL (1 %)  Aspirate: 0 mL  Outcome: tolerated well, no immediate complications  Procedure, treatment alternatives, risks and benefits explained, specific risks discussed. Consent was given by the patient. Patient was prepped and draped in the usual sterile fashion.

## 2024-02-08 PROCEDURE — 20611 DRAIN/INJ JOINT/BURSA W/US: CPT | Performed by: STUDENT IN AN ORGANIZED HEALTH CARE EDUCATION/TRAINING PROGRAM

## 2024-02-08 RX ORDER — LIDOCAINE HYDROCHLORIDE 10 MG/ML
3 INJECTION, SOLUTION EPIDURAL; INFILTRATION; INTRACAUDAL; PERINEURAL
Status: COMPLETED | OUTPATIENT
Start: 2024-02-08 | End: 2024-02-08

## 2024-02-08 RX ORDER — METHYLPREDNISOLONE ACETATE 40 MG/ML
40 INJECTION, SUSPENSION INTRA-ARTICULAR; INTRALESIONAL; INTRAMUSCULAR; SOFT TISSUE
Status: COMPLETED | OUTPATIENT
Start: 2024-02-08 | End: 2024-02-08

## 2024-02-08 RX ADMIN — LIDOCAINE HYDROCHLORIDE 3 ML: 10 INJECTION, SOLUTION EPIDURAL; INFILTRATION; INTRACAUDAL; PERINEURAL at 14:14

## 2024-02-08 RX ADMIN — METHYLPREDNISOLONE ACETATE 40 MG: 40 INJECTION, SUSPENSION INTRA-ARTICULAR; INTRALESIONAL; INTRAMUSCULAR; SOFT TISSUE at 14:14

## 2024-03-20 ENCOUNTER — OFFICE VISIT (OUTPATIENT)
Dept: ORTHOPEDIC SURGERY | Facility: CLINIC | Age: 24
End: 2024-03-20
Payer: MEDICARE

## 2024-03-20 DIAGNOSIS — M25.551 HIP PAIN, ACUTE, RIGHT: Primary | ICD-10-CM

## 2024-03-20 PROCEDURE — 99213 OFFICE O/P EST LOW 20 MIN: CPT | Performed by: STUDENT IN AN ORGANIZED HEALTH CARE EDUCATION/TRAINING PROGRAM

## 2024-03-20 PROCEDURE — 1036F TOBACCO NON-USER: CPT | Performed by: STUDENT IN AN ORGANIZED HEALTH CARE EDUCATION/TRAINING PROGRAM

## 2024-03-20 NOTE — PROGRESS NOTES
IMPRESSION:  New problem - acute exacerbation of Right hip/groin pain probably aggravation/extension of hip labral tear.   Friedreich's ataxia with progressive weakness, truncal instability and wheelchair dependence-  Now significantly improved   Scheuermann's kyphosis   Lumbar spondylosis with documented grade 1 L5 anterolisthesis, and lumbar disc protrusion at L4 and L5  Axial pain at cervicothoracic junction is probably mechanical  Prior injections with Dr. Johnson were not in HIE but diagnosed under spondylosis so probably MBB  Annular tear noted at L4-L5 disc on Lumbar MRI from June 8, 2021   now s/p b/l Medial branch RFA at [place] (while inpatient) ?L2-3-4-5 ~Nov 2023    Update: injection improved her symptoms by 70-80% able to assist in transfers and participate in therapies.     RECOMMENDATIONS:  - s/p sublabral interarticular hip injection with good relief if pain lingers or returns will repeat injection.   - Still ? Needs to followup with  wheelchair seating clinic at [facility],  ?if she got relief built-in for her thoracic spinous process, -to schedule repeat BL L5 TFESI if LBP gets worse again.  with 4mg IV versed and depo 40 mg each side  Discussed eventually repeat RFA but try to wait at least 1 year from last Nov .   Also maybe consider Sprint PNS implant or orthobioligics?  Maybe more lumbar bracing?  -Continue taking oxycodone 10mg QID. Per PCP/Pain.   Continue diclofenac gel prn. But I sent a new prescription for diclofenac drops can be applied to any region, may be helpful for the mid thoracic, continue lidocaine patch  - follow up ~6wk        Ready to learn, no apparent learning barriers. Education provided on treatment plan according to patient's preferred learning style. Patient verbalizes understanding.    Katarzyna Thayer DO (MSK/Spine fellow)            CC: Patient complains of low back pain    Here with her mother who helps with history .   Dr Russell did MBB for L5-S1 facets  6/12/23 with good partial relief 8/10 down to 2/10 that day but had a lot of versed then...  Then Bilateral L5 TFESI 7/10/24 with good relief for ~2-3mo but LPB even worse so severe she went to ED in [place] by squad a few times in Oct/Nov and eventually had bilateral L3,4 ? RFA by pain mgmt there while in hosptial.  CT chest abd pelvis and Lumbar cofirmed moderate facet arthropathy b/l L4-5 and L5-S1.      ? Onoging thoracic pain, started around the same time in March, she did have a fall during transfer about 2 weeks before the epidural injection, with right Overall Rates pain 9/10. Still taking percocet 10mg QID as per PCP? pain management. Started lamotrigine for neuropathic pain but now is back to just taking gabapentin and baclofen, topical diclofenac. Has tried nitroglycerin for pain.      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 She also has history of obesity's status post bariatric surgery in 2019 with 180 pounds of weight loss. She complains of many years of low back pain attributed to her weight and wheelchair use, has a history of Shermans kyphosis and intermittent thoracic pain     Patient reports no fevers, chills, sweats, night pain, cancer history no bowel or bladder disturbance .  She does endorse mood problems, sleep disturbance muscle cramping and stomach problems.      Pertinent Physical Exam (see remainder below from previous visit):  MSK: Sitting posture is only mildly slouching, new weelchair fits better , leg bolsters are better closer to lateral thighs. Still  mild kyphosis, with a prominent thoracic spinous process around to the 10, focally tender and mild skin induration/discoloration in this region. Some difficulty with trunk control, and stands with assist from her mom that increases her pain. POS tenderness bilateral paraspinals and midline both in the lower lumbar spine, and the  lower cervical/upper thoracic, but minimal midline tenderness. Range of motion is not fully testable because of her weakness, but cervical moves well,   Neuro: Trace weakness in bilateral finger extensors, 4/5 and hand intrinsics, otherwise normal strength in upper extremities, Quads 3/5 on Right (pain limited) and 3/5 on left clearly weaker on Left , ADF 2/5, PF is still 4+/5 with manual testing except toe flexors 4/5. Straight leg raise negative sitting   Rt Hip Exam: Mildly reduced ROM, mildly diffusely tender except focally more over anterior joint, YANELISLacey, tests pos with typical pain,  Gait Not testable, POS impingement signs both seated and supine (exam done on xray table)      ____________________________________________________________________     SUPPORTING DOCUMENTATION (remaining history, exam, other findings):     Work-up reviewed - this has included MRi Lsp at [facility] 2020- and MRI Lsp at             June 8th 2021     L3 -- 4: Diffuse disc bulge. Patent central canal. Patent bilateral   neural foramina.      L4 -- 5: Diffuse disc bulge. Facet osteoarthritis. Patent central canal. Patent bilateral neural foramina.      posterior annular tear  L5 -- S1: Diffuse disc bulge, significant facet osteoarthritis, grade 1anterior listhesis of L5 over S1 vertebral body. Patent central canal.        Severe bilateral foramina narrowing.                                                         -thoracic and right hip x-rays May '23 show wedging of T10 and T11 vertebrae, with prominent spinous process/gap between adjacent, hip x-rays look normal but do show lumbar facet arthropathy L5-S1  MRI Tspine - chronic wedging T10/11 and multilevel DDD c/w scheuermans   CT Lsp 11/18/23 - Mod deg facet changes Lower lumbar   Had BL L5 TFESI on 8/24/2022 -: had 60% relief for 4-5 months but repeat bilateral L5 transforaminal March 13 seem to help last, especially the right side     Treatment has included spine injections  Dr Martinez and me 10/25/21 helped GREATLY - almost no pain x 1 mo and lasted about 1.5 months then gradually returned.  And per above  Dr Russell did MBB for L5-S1 facets 6/12/23 with good partial relief 8/10 down to 2/10 that day but had a lot of versed then...  Then Bilateral L5 TFESI 7/10/24 with good relief for ~2-3mo but LPB even worse so severe she went to ED in [place] by squad a few times in Oct/Nov and eventually had bilateral L3,4 ? RFA by pain mgmt there while in hosptial  (fluoro images look like MBB b/ L2,3,4?5~11/20/23  See above for Assessment and Plan

## 2024-06-26 ENCOUNTER — OFFICE VISIT (OUTPATIENT)
Dept: ORTHOPEDIC SURGERY | Facility: CLINIC | Age: 24
End: 2024-06-26
Payer: MEDICARE

## 2024-06-26 ENCOUNTER — HOSPITAL ENCOUNTER (OUTPATIENT)
Dept: RADIOLOGY | Facility: EXTERNAL LOCATION | Age: 24
Discharge: HOME | End: 2024-06-26

## 2024-06-26 DIAGNOSIS — M25.551 HIP PAIN, ACUTE, RIGHT: Primary | ICD-10-CM

## 2024-06-26 PROCEDURE — 20611 DRAIN/INJ JOINT/BURSA W/US: CPT | Performed by: PHYSICAL MEDICINE & REHABILITATION

## 2024-06-26 PROCEDURE — 99213 OFFICE O/P EST LOW 20 MIN: CPT | Performed by: STUDENT IN AN ORGANIZED HEALTH CARE EDUCATION/TRAINING PROGRAM

## 2024-06-26 RX ORDER — METHYLPREDNISOLONE ACETATE 40 MG/ML
40 INJECTION, SUSPENSION INTRA-ARTICULAR; INTRALESIONAL; INTRAMUSCULAR; SOFT TISSUE
Status: COMPLETED | OUTPATIENT
Start: 2024-06-26 | End: 2024-06-26

## 2024-06-26 RX ORDER — LIDOCAINE HYDROCHLORIDE 10 MG/ML
4 INJECTION, SOLUTION EPIDURAL; INFILTRATION; INTRACAUDAL; PERINEURAL
Status: COMPLETED | OUTPATIENT
Start: 2024-06-26 | End: 2024-06-26

## 2024-06-26 NOTE — PROGRESS NOTES
IMPRESSION:  New problem - acute exacerbation of Right hip/groin pain probably aggravation/extension of hip labral tear.   Friedreich's ataxia with progressive weakness, truncal instability and wheelchair dependence-  Now significantly improved   Scheuermann's kyphosis   Lumbar spondylosis with documented grade 1 L5 anterolisthesis, and lumbar disc protrusion at L4 and L5  Axial pain at cervicothoracic junction is probably mechanical  Prior injections with Dr. Johnson were not in HIE but diagnosed under spondylosis so probably MBB  Annular tear noted at L4-L5 disc on Lumbar MRI from June 8, 2021   now s/p b/l Medial branch RFA at [place] (while inpatient) ?L2-3-4-5 ~Nov 2023    Update: injection in Feb improved her symptoms by 70-80% able to assist in transfers and participate in therapies.     RECOMMENDATIONS:  L Inj/Asp: R hip joint on 6/26/2024 4:01 PM  Indications: pain  Details: 20 G needle, ultrasound-guided anterior approach  Medications: 40 mg methylPREDNISolone acetate 40 mg/mL; 4 mL lidocaine PF 10 mg/mL (1 %)  Outcome: tolerated well, no immediate complications    Femoral head/neck junction approach after skin anesthesia with lido and 25ga needle.   Procedure, treatment alternatives, risks and benefits explained, specific risks discussed. Consent was given by the patient. Patient was prepped and draped in the usual sterile fashion.       - discussed possible repeat MBB then RFA (or ?just straight to RFA with DR Capellan or Jeovany - she prefers not to do with pain mgmt who did it as inpatient Nov '23.   Could also repeat L5 TFESIs ?bilateral.  with 4mg IV versed and depo 40 mg each side  Discussed eventually repeat RFA but try to wait at least 1 year from last Nov .   Also maybe consider Sprint PNS implant or orthobioligics?  Maybe more lumbar bracing?  -Continue taking oxycodone 10mg QID. Per PCP/Pain.   Continue diclofenac gel prn. \- follow up ~3mo with Dr Russell        Ready to learn, no apparent  learning barriers. Education provided on treatment plan according to patient's preferred learning style. Patient verbalizes understanding.    Katarzyna Thayer DO (MSK/Spine fellow)      Supervisory note:  Seen with Dr Katarzyna Thayer  fellow.  I saw and evaluated the patient. I personally obtained the key and critical portions of the history and physical exam. I reviewed the fellow's documentation and discussed the patient with the fellow. I agree with the fellow's medical decision making as documented in the note.     I directly supervised the injection and did the exam and history and plan above.       [Addendum 10/2/2024 [employer] message that her LBP and sciatica is really flared up again.  Maybe RFA from Nov 23 wearing off but with sciatica we'll try B/l L5 TFESIs again.  ]    Manas Russell M.D, FAAPMR, R-MSK  Chief, Division of PM&R  Board Certified in PM&R, Sports Medicine and Musculoskeletal Ultrasound       ----------------------------------------------------------------     CC: Patient complains of low back pain    Here with her mother who helps with history .      Previous right hip injection February 2024 helped very significantly, over 3 months of relief, was gradually started to wear off with intermittent pain but about 1 month ago she had an awkward transfer done by herself and her leg was twisted temporarily which seemed to significantly exacerbate right groin pain.  This has persisted, deep severe achy sometimes sharp, worse with any motion, sometimes even with rest.  Has upcoming car trip to [place] for vacation and concerned about pain control during this.    Low back pain is also starting to return, mostly axial, no radiation.  Feels similar to what happened in November 2023 that eventually responded to radiofrequency ablation  No fevers, chills sweats or recent dental work.        CT chest abd pelvis and Lumbar cofirmed moderate facet arthropathy b/l L4-5 and L5-S1.      ? Onoging thoracic  pain, started around the same time in March, she did have a fall during transfer about 2 weeks before the epidural injection, with right Overall Rates pain 9/10. Still taking percocet 10mg QID as per PCP? pain management. Started lamotrigine for neuropathic pain but now is back to just taking gabapentin and baclofen, topical diclofenac. Has tried nitroglycerin for pain.      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 She also has history of obesity's status post bariatric surgery in 2019 with 180 pounds of weight loss. She complains of many years of low back pain attributed to her weight and wheelchair use, has a history of Shermans kyphosis and intermittent thoracic pain     Patient reports no fevers, chills, sweats, night pain, cancer history no bowel or bladder disturbance .  She does endorse mood problems, sleep disturbance muscle cramping and stomach problems.      Pertinent Physical MSK: Sitting posture is only mildly slouching, Still  mild kyphosis, with a prominent thoracic spinous process T10, focally tender and mild skin induration/discoloration in this region.   [Neurofrom previous : Trace weakness in bilateral finger extensors, 4/5 and hand intrinsics, otherwise normal strength in upper extremities, Quads 3/5 on Right (pain limited) and 3/5 on left clearly weaker on Left , ADF 2/5, PF is still 4+/5 with manual testing except toe flexors 4/5. Straight leg raise negative sitting ]  Rt Hip Exam: Mildly reduced ROM, mildly diffusely tender except focally more over anterior joint, YANELIS, [place], tests pos with typical pain,  Gait Not testable, POS impingement signs      ____________________________________________________________________     SUPPORTING DOCUMENTATION (remaining history, exam, other findings):     Work-up reviewed - this has included MRi Lsp at [facility] 2020- and MRI Lsp at             [place]  8th 2021     L3 -- 4: Diffuse disc bulge. Patent central canal. Patent bilateral   neural foramina.      L4 -- 5: Diffuse disc bulge. Facet osteoarthritis. Patent central canal. Patent bilateral neural foramina.      posterior annular tear  L5 -- S1: Diffuse disc bulge, significant facet osteoarthritis, grade 1anterior listhesis of L5 over S1 vertebral body. Patent central canal.        Severe bilateral foramina narrowing.                                                         -thoracic and right hip x-rays May '23 show wedging of T10 and T11 vertebrae, with prominent spinous process/gap between adjacent, hip x-rays look normal but do show lumbar facet arthropathy L5-S1  MRI Tspine - chronic wedging T10/11 and multilevel DDD c/w scheuermans   CT Lsp 11/18/23 - Mod deg facet changes Lower lumbar   Had BL L5 TFESI on 8/24/2022 -: had 60% relief for 4-5 months but repeat bilateral L5 transforaminal March 13 seem to help last, especially the right side     Treatment has included spine injections Dr Martinez and me 10/25/21 helped GREATLY - almost no pain x 1 mo and lasted about 1.5 months then gradually returned.  And per above  Dr Russell did MBB for L5-S1 facets 6/12/23 with good partial relief 8/10 down to 2/10 that day but had a lot of versed then...  Then Bilateral L5 TFESI 7/10/23 with good relief for ~2-3mo but LPB even worse so severe she went to ED in [place] by squad a few times in Oct/Nov and eventually had bilateral L3,4 ? RFA by pain mgmt there while in hosptial  (fluoro images look like MBB b/ L2,3,4?5~11/20/23  See above for Assessment and Plan

## 2024-09-19 ENCOUNTER — APPOINTMENT (OUTPATIENT)
Dept: ORTHOPEDIC SURGERY | Facility: CLINIC | Age: 24
End: 2024-09-19
Payer: MEDICARE

## 2024-11-04 ENCOUNTER — HOSPITAL ENCOUNTER (OUTPATIENT)
Dept: OPERATING ROOM | Facility: CLINIC | Age: 24
Discharge: HOME | End: 2024-11-04
Payer: MEDICARE

## 2024-11-04 VITALS
HEART RATE: 82 BPM | WEIGHT: 190 LBS | TEMPERATURE: 96.8 F | RESPIRATION RATE: 16 BRPM | DIASTOLIC BLOOD PRESSURE: 72 MMHG | HEIGHT: 68 IN | BODY MASS INDEX: 28.79 KG/M2 | OXYGEN SATURATION: 97 % | SYSTOLIC BLOOD PRESSURE: 109 MMHG

## 2024-11-04 DIAGNOSIS — M51.16 LUMBAR DISC HERNIATION WITH RADICULOPATHY: ICD-10-CM

## 2024-11-04 LAB — PREGNANCY TEST URINE, POC: NORMAL

## 2024-11-04 PROCEDURE — 3600000006 HC OR TIME - EACH INCREMENTAL 1 MINUTE - PROCEDURE LEVEL ONE

## 2024-11-04 PROCEDURE — 3600000001 HC OR TIME - INITIAL BASE CHARGE - PROCEDURE LEVEL ONE

## 2024-11-04 PROCEDURE — 2500000004 HC RX 250 GENERAL PHARMACY W/ HCPCS (ALT 636 FOR OP/ED): Mod: SE | Performed by: PHYSICAL MEDICINE & REHABILITATION

## 2024-11-04 PROCEDURE — 7100000010 HC PHASE TWO TIME - EACH INCREMENTAL 1 MINUTE

## 2024-11-04 PROCEDURE — 2550000001 HC RX 255 CONTRASTS: Mod: SE | Performed by: PHYSICAL MEDICINE & REHABILITATION

## 2024-11-04 PROCEDURE — 7100000009 HC PHASE TWO TIME - INITIAL BASE CHARGE

## 2024-11-04 RX ORDER — LIDOCAINE HYDROCHLORIDE 10 MG/ML
INJECTION, SOLUTION EPIDURAL; INFILTRATION; INTRACAUDAL; PERINEURAL AS NEEDED
Status: COMPLETED | OUTPATIENT
Start: 2024-11-04 | End: 2024-11-04

## 2024-11-04 RX ORDER — METHYLPREDNISOLONE ACETATE 40 MG/ML
INJECTION, SUSPENSION INTRA-ARTICULAR; INTRALESIONAL; INTRAMUSCULAR; SOFT TISSUE AS NEEDED
Status: COMPLETED | OUTPATIENT
Start: 2024-11-04 | End: 2024-11-04

## 2024-11-04 RX ORDER — MIDAZOLAM HYDROCHLORIDE 1 MG/ML
INJECTION, SOLUTION INTRAMUSCULAR; INTRAVENOUS AS NEEDED
Status: COMPLETED | OUTPATIENT
Start: 2024-11-04 | End: 2024-11-04

## 2024-11-04 RX ORDER — INDOMETHACIN 25 MG/1
CAPSULE ORAL AS NEEDED
Status: COMPLETED | OUTPATIENT
Start: 2024-11-04 | End: 2024-11-04

## 2024-11-04 ASSESSMENT — ENCOUNTER SYMPTOMS
ENDOCRINE NEGATIVE: 1
ALLERGIC/IMMUNOLOGIC NEGATIVE: 1
CARDIOVASCULAR NEGATIVE: 1
CONSTITUTIONAL NEGATIVE: 1
BACK PAIN: 1
EYES NEGATIVE: 1
NEUROLOGICAL NEGATIVE: 1
HEMATOLOGIC/LYMPHATIC NEGATIVE: 1
GASTROINTESTINAL NEGATIVE: 1
PSYCHIATRIC NEGATIVE: 1
RESPIRATORY NEGATIVE: 1

## 2024-11-04 ASSESSMENT — PAIN - FUNCTIONAL ASSESSMENT: PAIN_FUNCTIONAL_ASSESSMENT: 0-10

## 2024-11-04 ASSESSMENT — PAIN SCALES - GENERAL: PAINLEVEL_OUTOF10: 6

## 2024-11-04 NOTE — H&P
History Of Present Illness  Berta East is a 24 y.o. female presenting with lumbar disc herniation with radiculopathy. Unchanged pain since last office visit  .     No fevers, chills sweats or recent dental work     Past Medical History  She has no past medical history on file.      There is no immunization history on file for this patient.  Surgical History  She has a past surgical history that includes Cholecystectomy; Tonsillectomy; Adenoidectomy; and Gastric bypass.     Social History  She reports that she has never smoked. She has never used smokeless tobacco. She reports that she does not drink alcohol and does not use drugs.    Family History  Her family history is not on file.     Allergies  Morphine      Review of Systems   Constitutional: Negative.    HENT: Negative.     Eyes: Negative.    Respiratory: Negative.     Cardiovascular: Negative.    Gastrointestinal: Negative.    Endocrine: Negative.    Genitourinary: Negative.    Musculoskeletal:  Positive for back pain.   Allergic/Immunologic: Negative.    Neurological: Negative.    Hematological: Negative.    Psychiatric/Behavioral: Negative.          Physical Exam:    Anesthesia Evaluation      Airway   Mallampati: III  Dental     Pulmonary   Cardiovascular     Neuro/Psych   GI/Hepatic/Renal     Endo/Other               Alert and Oriented enough for consent    Pertinent Physical MSK: Sitting posture is only mildly slouching, Still  mild kyphosis, with a prominent thoracic spinous process T10, focally tender and mild skin induration/discoloration in this region.   [Neurofrom previous : Trace weakness in bilateral finger extensors, 4/5 and hand intrinsics, otherwise normal strength in upper extremities, Quads 3/5 on Right (pain limited) and 3/5 on left clearly weaker on Left , ADF 2/5, PF is still 4+/5 with manual testing except toe flexors 4/5. Straight leg raise negative sitting ]  Rt Hip Exam: Mildly reduced ROM, mildly diffusely tender except  "focally more over anterior joint, YANELISLacey, tests pos with typical pain,  Gait Not testable, POS impingement signs       Vitals  Temp:  [36.1 °C (97 °F)] 36.1 °C (97 °F)  Heart Rate:  [56] 56  Resp:  [16] 16  BP: (127)/(69) 127/69    Relevant Results  No results found for: \"HGBA1C\"  No results found for: \"GLU\"  Lab Results   Component Value Date    GLUCOSE 90 11/30/2023    CALCIUM 8.5 (L) 11/30/2023     11/30/2023    K 4.0 11/30/2023    CO2 29 11/30/2023     11/30/2023    BUN 16 11/30/2023    CREATININE 0.47 (L) 11/30/2023     Lab Results   Component Value Date    WBC 6.8 11/30/2023    HGB 11.9 (L) 11/30/2023    HCT 36.9 11/30/2023    MCV 90 11/30/2023     11/30/2023         Assessment/Plan   Assessment & Plan  Lumbar disc herniation with radiculopathy      IMPRESSION:  New problem - acute exacerbation of Right hip/groin pain probably aggravation/extension of hip labral tear.   Friedreich's ataxia with progressive weakness, truncal instability and wheelchair dependence-  Now significantly improved   Scheuermann's kyphosis   Lumbar spondylosis with documented grade 1 L5 anterolisthesis, and lumbar disc protrusion at L4 and L5  Axial pain at cervicothoracic junction is probably mechanical  Prior injections with Dr. Johnson were not in HIE but diagnosed under spondylosis so probably MBB  Annular tear noted at L4-L5 disc on Lumbar MRI from June 8, 2021   now s/p b/l Medial branch RFA at [facility] (while inpatient) ?L2-3-4-5 ~Nov 2023     Update: injection in Feb improved her symptoms by 70-80% able to assist in transfers and participate in therapies.          Plan    - discussed possible repeat MBB then RFA (or ?just straight to RFA with DR Capellan or Jeovany - she prefers not to do with pain mgmt who did it as inpatient Nov '23.   Could also repeat L5 TFESIs ?bilateral.  with 4mg IV versed and depo 40 mg each side  Discussed eventually repeat RFA but try to wait at least 1 year from last Nov .   " Also maybe consider Sprint PNS implant or orthobioligics?  Maybe more lumbar bracing?  -Continue taking oxycodone 10mg QID. Per PCP/Pain.   Continue diclofenac gel prn. \- follow up ~3mo with Dr Drew Ferrera MD

## 2024-11-04 NOTE — DISCHARGE INSTRUCTIONS
"Post Procedure Instructions     1. You MUST have a  to take you home and be available to assist you at home if needed, unless specifically arranged before procedure.     2. Get up from your seated or lying position slowly and carefully. It is not unusual to have some \"numbness\" in your back or legs following a lumbar injection. After a cervical injection it is not abnormal to have arm or neck numbness. The symptoms (if they occur) may last a few hours, but will wear off. Have someone assist you as you walk if numbness in your legs occurs.     3. Complete sensory block is the intent of an injection to nerves. Occasionally in trying to achieve sensory blockade you also receive a motor blockade (weak arm or leg)     4. If you receive sedation, do not drive a motorized vehicle for 24 hours.     5. Avoid ALL alcoholic beverages the day of your procedure.     6. Discuss sleep and pain medications with your prescribing physician if you received sedation.     7. It is safe to resume medications once home.     8. Tomorrow you may resume regular activities to the level your pain will tolerate. The exception is no lifting over 20 pounds for 36 hours. You will likely experience a temporary exacerbation of pre-injection pain when anesthetic medication wears off.     9. Remove your band aid tomorrow.     10. Do not take a tub bath or submerge in water for 48 hours. You may shower.     11. Apply ice to your injection site if it is swollen or hurts after the injection. Only apply 20 minutes on, then off for 20 minutes. Take the ice off as you sleep.     12. Call scheduling office at [phone] to verify a follow-up appointment 4-6 weeks after your procedure / injection with your pain diary  if you were given one - Make an appointment with Dr Katarzyna Thayer or Dr Russell.     13. Call your physician immediately or go to the emergency room for any of the following symptoms:    Severe pain at your injection site (tightness or " "aching is normal)    Pain, redness, pus, or leaking fluid at the injection site    Prolonged or increasing numbness 14 hours after a block    A temperature over 101.5 degrees F and / or chills    Excessive bruising, bleeding, or swelling at the injection site    Loss of bowel or bladder control or \"saddle anesthesia\"    Inability to speak, facial droop, and / or limb paralysis     Note: if you have any questions please contact our office at [phone], if the office is closed please call the after hours phone number at [phone] and ask for the pain resident on call    Manas Russell M.D, FAAPMR, R-MSK  Chief, Division of PM&R  Board Certified in PM&R, Sports Medicine and Musculoskeletal Ultrasound    To schedule FOLLOW-UP appointments, please use the call center at [phone].   To schedule future PROCEDURES or for questions for the doctor please call [phone].      "

## 2025-05-22 ENCOUNTER — PATIENT MESSAGE (OUTPATIENT)
Dept: ORTHOPEDIC SURGERY | Facility: CLINIC | Age: 25
End: 2025-05-22
Payer: MEDICARE

## 2025-05-22 DIAGNOSIS — M51.16 LUMBAR DISC HERNIATION WITH RADICULOPATHY: ICD-10-CM

## 2025-05-22 DIAGNOSIS — G11.11: Primary | ICD-10-CM

## 2025-05-22 DIAGNOSIS — M25.551 HIP PAIN, ACUTE, RIGHT: ICD-10-CM

## 2025-05-29 ENCOUNTER — APPOINTMENT (OUTPATIENT)
Dept: ORTHOPEDIC SURGERY | Facility: CLINIC | Age: 25
End: 2025-05-29
Payer: MEDICARE

## 2025-05-29 DIAGNOSIS — M47.816 FACET ARTHROPATHY, LUMBAR: ICD-10-CM

## 2025-05-29 DIAGNOSIS — M25.551 HIP PAIN, ACUTE, RIGHT: Primary | ICD-10-CM

## 2025-05-29 DIAGNOSIS — M24.151 DEGENERATIVE TEAR OF ACETABULAR LABRUM OF RIGHT HIP: ICD-10-CM

## 2025-05-29 DIAGNOSIS — M51.16 LUMBAR DISC HERNIATION WITH RADICULOPATHY: ICD-10-CM

## 2025-05-29 PROCEDURE — 99214 OFFICE O/P EST MOD 30 MIN: CPT | Performed by: PHYSICAL MEDICINE & REHABILITATION

## 2025-05-29 SDOH — SOCIAL STABILITY: SOCIAL NETWORK: SOCIAL ACTIVITY:: 7

## 2025-05-29 NOTE — PROGRESS NOTES
IMPRESSION:  -New problem - acute exacerbation of Right hip/groin pain probably aggravation/extension of hip labral tear.   -Friedreich's ataxia with progressive weakness, truncal instability and wheelchair dependence-  Now significantly improved   -Scheuermann's kyphosis   -Lumbar spondylosis with documented grade 1 L5 anterolisthesis, and lumbar disc protrusion at L4 and L5  -Axial pain at cervicothoracic junction is probably mechanical    Annular tear noted at L4-L5 disc on Lumbar MRI from June 8, 2021    Prior injections with Dr. Johnson were not in HIE but diagnosed under spondylosis so probably MBB    now s/p b/l Medial branch RFA at [facility] (while inpatient) ?L2-3-4-5 ~Nov 2023    Injection   Hip Injection in Feb 2024 improved her symptoms by 70-80% able to assist in transfers and participate in therapies. Hip injection June 2024 with improvement.    Bilateral L5 transforaminal epidural steroid injections 11/4/24 with 80% improvement    RECOMMENDATIONS:  - Ordering R hip injection with Dr Thayer in [place] with Fluoroscopy  - Ordering MBB x2 bilaterally L4-5 and L5-S1 joints, nerves L3, 4,5    - MBB then RFA with DR Capellan or Jeovany - she prefers not to do with pain mgmt who did it as inpatient Nov '23.   Could also repeat L5 TFESIs ?bilateral.  with 4mg IV versed and depo 40 mg each side  Discussed eventually repeat RFA but try to wait at least 1 year from last Nov .   Also maybe consider Sprint PNS implant or orthobioligics?  Maybe more lumbar bracing?  -Continue taking oxycodone 10mg QID. Per PCP/Pain.   Continue diclofenac gel prn. \- follow up ~3mo with Dr Russell        Ready to learn, no apparent learning barriers. Education provided on treatment plan according to patient's preferred learning style. Patient verbalizes understanding.    Kelly Ferrera MD  Fellow [facility], PM&R  Patient seen with Dr Russell    Supervisory note:  Seen with Dr Kelly Ferrera M.D.    fellow.  I saw and evaluated the  patient. I personally obtained the key and critical portions of the history and physical exam. I reviewed the fellow's documentation and discussed the patient with the fellow. I agree with the fellow's medical decision making as documented in the note.       Controlled fall from her wheelchair and transfer probably resulted in forceful hip impingement, and extension of her known labral tear.  CT scan report from [facility] reviewed is reassuring, and fortunately she is improving with steroids.  If the pain worsens again Dr. Mary Thayer, D.O. or or I could do intra-articular hip injection.     She also thinks her lumbar facet pain is gradually worsening and it has been about 2 years since her successful RFA that was done while inpatient at [facility].  I think she needs to have at least 1 set of diagnostic medial branch blocks bilateral L3-4-5 because of her complexity and young age , but if any doubt I would recommend a second set of medial branch blocks and then we will consider radiofrequency ablation if positive.  She also did get relief with bilateral L5 transforaminal epidural injections in November 2024, so we could revert to those if medial branch blocks are equivocal   She is also happy to have Dr. Thayer do her injections even at [facility]    Meanwhile we will restart pool therapy hopefully  [place]    Mother assists with history significantly and agrees with plan      Manas Russell M.D, FAAPMR, R-MSK  Chief, Division of PM&R  Board Certified in PM&R, Sports Medicine and Musculoskeletal Ultrasound       ----------------------------------------------------------------     CC: Patient complains of low back pain    Here with her mother who helps with history .    Update 5/29/25:  Three weeks ago she was transferring and had a controlled fall. Her legs were buckled. Around after that her hip pain significantly worsened. Patient was unable to be comfortable at home. She could not sleep.   Patient was  admitted to [facility] 5/22-25.   Patient got an IV of steroids for 3 days followed by PO steroids which ended yesterday 5/28/25.    Patient had her hip injection in June 2024.   Patient had bilateral L5 transforaminal epidural steroid injection.     Patient is describing jolts and jerking from the right hip down her leg. Patient experienced pain down the right leg and thigh into the knee.     Patient continues to take the same prescribed medications from Dr Sparks of pain management.     Patient says her pain currently is a 3/10, but it is a dull throbbing pain.   With movement the pain goes up to 8/10.  Patient states that when she has the shooting sensation it is even higher 9/10.  The pain increases with transfers.      CT imaging of hip right side from [facility] images not available but impression reports no fractures or lesions.    Patient has done well doing therapy with children's [facility] which helps her  [place], but she has aged out of it. She has also had success with aquatic therapy.       Recall,  Previous right hip injection February 2024 helped very significantly, over 3 months of relief, was gradually started to wear off with intermittent pain but about 1 month ago she had an awkward transfer done by herself and her leg was twisted temporarily which seemed to significantly exacerbate right groin pain.  This has persisted, deep severe achy sometimes sharp, worse with any motion, sometimes even with rest.  Has upcoming car trip to [place] for vacation and concerned about pain control during this.    Low back pain is also starting to return, mostly axial, no radiation.  Feels similar to what happened in November 2023 that eventually responded to radiofrequency ablation  No fevers, chills sweats or recent dental work.        CT chest abd pelvis and Lumbar cofirmed moderate facet arthropathy b/l L4-5 and L5-S1.      ? Onoging thoracic pain, started around the same time in March, she did have a fall  during transfer about 2 weeks before the epidural injection, with right Overall Rates pain 9/10. Still taking percocet 10mg QID as per PCP? pain management. Started lamotrigine for neuropathic pain but now is back to just taking gabapentin and baclofen, topical diclofenac. Has tried nitroglycerin for pain.      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 She also has history of obesity's status post bariatric surgery in 2019 with 180 pounds of weight loss. She complains of many years of low back pain attributed to her weight and wheelchair use, has a history of Shermans kyphosis and intermittent thoracic pain     Patient reports no fevers, chills, sweats, night pain, cancer history no bowel or bladder disturbance .  She does endorse mood problems, sleep disturbance muscle cramping and stomach problems.      Pertinent Physical MSK: Sitting posture is only mildly slouching- unchangfed  [Neurofrom previous : Trace weakness in bilateral finger extensors, 4/5 and hand intrinsics, otherwise normal strength in upper extremities, Quads 3/5 on Right (pain limited) and 3/5 on left clearly weaker on Left , ADF 2/5, PF is still 4+/5 with manual testing except toe flexors 4/5. Straight leg raise negative sitting ]    Right Hip Exam: Severely reduced ROM,  Tender to lateral sided palpation of hip, Stinchfield with pain on R side, tests positive with typical pain. Trochanter tender  Gait unable to test, in wheelchair; positive impingement signs  Strength limited by guarding in RLE whereas it used to be the significantly stronger leg.     ____________________________________________________________________     SUPPORTING DOCUMENTATION (remaining history, exam, other findings):     Work-up reviewed - this has included MRi Lsp at [facility] 2020- and MRI Lsp at             June 8th 2021     L3 -- 4: Diffuse disc bulge. Patent central  canal. Patent bilateral   neural foramina.      L4 -- 5: Diffuse disc bulge. Facet osteoarthritis. Patent central canal. Patent bilateral neural foramina.      posterior annular tear  L5 -- S1: Diffuse disc bulge, significant facet osteoarthritis, grade 1anterior listhesis of L5 over S1 vertebral body. Patent central canal.        Severe bilateral foramina narrowing.                                                         -thoracic and right hip x-rays May '23 show wedging of T10 and T11 vertebrae, with prominent spinous process/gap between adjacent, hip x-rays look normal but do show lumbar facet arthropathy L5-S1  MRI Tspine - chronic wedging T10/11 and multilevel DDD c/w scheuermans   CT Lsp 11/18/23 - Mod deg facet changes Lower lumbar   Had BL L5 TFESI on 8/24/2022 -: had 60% relief for 4-5 months but repeat bilateral L5 transforaminal March 13 seem to help last, especially the right side     Treatment has included spine injections Dr Martinez and me 10/25/21 helped GREATLY - almost no pain x 1 mo and lasted about 1.5 months then gradually returned.  And per above  Dr Russell did MBB for L5-S1 facets 6/12/23 with good partial relief 8/10 down to 2/10 that day but had a lot of versed then...  Then Bilateral L5 TFESI 7/10/23 with good relief for ~2-3mo but LPB even worse so severe she went to ED in [place] by squad a few times in Oct/Nov and eventually had bilateral L3,4 ? RFA by pain mgmt there while in hosptial  (fluoro images look like MBB b/ L2,3,4?5~11/20/23  See above for Assessment and Plan

## 2025-05-30 PROBLEM — M47.816 FACET ARTHROPATHY, LUMBAR: Status: ACTIVE | Noted: 2025-05-30

## 2025-05-30 PROBLEM — M24.151 DEGENERATIVE TEAR OF ACETABULAR LABRUM OF RIGHT HIP: Status: ACTIVE | Noted: 2025-05-30

## 2025-05-30 PROBLEM — M51.16 LUMBAR DISC HERNIATION WITH RADICULOPATHY: Status: ACTIVE | Noted: 2025-05-30

## 2025-05-30 PROBLEM — M25.551 HIP PAIN, ACUTE, RIGHT: Status: ACTIVE | Noted: 2025-05-30

## 2025-08-14 ENCOUNTER — HOSPITAL ENCOUNTER (OUTPATIENT)
Dept: OPERATING ROOM | Facility: CLINIC | Age: 25
Discharge: HOME | End: 2025-08-14
Payer: MEDICARE

## 2025-08-14 VITALS
TEMPERATURE: 96.8 F | HEART RATE: 66 BPM | SYSTOLIC BLOOD PRESSURE: 104 MMHG | HEIGHT: 69 IN | WEIGHT: 205.47 LBS | DIASTOLIC BLOOD PRESSURE: 57 MMHG | BODY MASS INDEX: 30.43 KG/M2 | OXYGEN SATURATION: 97 % | RESPIRATION RATE: 16 BRPM

## 2025-08-14 DIAGNOSIS — M25.551 HIP PAIN, ACUTE, RIGHT: ICD-10-CM

## 2025-08-14 PROCEDURE — 3600000006 HC OR TIME - EACH INCREMENTAL 1 MINUTE - PROCEDURE LEVEL ONE

## 2025-08-14 PROCEDURE — 2550000001 HC RX 255 CONTRASTS: Performed by: STUDENT IN AN ORGANIZED HEALTH CARE EDUCATION/TRAINING PROGRAM

## 2025-08-14 PROCEDURE — 3600000001 HC OR TIME - INITIAL BASE CHARGE - PROCEDURE LEVEL ONE

## 2025-08-14 PROCEDURE — 2500000004 HC RX 250 GENERAL PHARMACY W/ HCPCS (ALT 636 FOR OP/ED): Performed by: STUDENT IN AN ORGANIZED HEALTH CARE EDUCATION/TRAINING PROGRAM

## 2025-08-14 PROCEDURE — 7100000009 HC PHASE TWO TIME - INITIAL BASE CHARGE

## 2025-08-14 PROCEDURE — 20610 DRAIN/INJ JOINT/BURSA W/O US: CPT | Performed by: STUDENT IN AN ORGANIZED HEALTH CARE EDUCATION/TRAINING PROGRAM

## 2025-08-14 PROCEDURE — 7100000010 HC PHASE TWO TIME - EACH INCREMENTAL 1 MINUTE

## 2025-08-14 RX ORDER — SODIUM BICARBONATE 42 MG/ML
INJECTION, SOLUTION INTRAVENOUS AS NEEDED
Status: COMPLETED | OUTPATIENT
Start: 2025-08-14 | End: 2025-08-14

## 2025-08-14 RX ORDER — LIDOCAINE HYDROCHLORIDE 10 MG/ML
INJECTION, SOLUTION INFILTRATION; PERINEURAL AS NEEDED
Status: COMPLETED | OUTPATIENT
Start: 2025-08-14 | End: 2025-08-14

## 2025-08-14 RX ADMIN — IOHEXOL 10 ML: 240 INJECTION, SOLUTION INTRATHECAL; INTRAVASCULAR; INTRAVENOUS; ORAL at 15:29

## 2025-08-14 RX ADMIN — LIDOCAINE HYDROCHLORIDE 10 ML: 10 INJECTION, SOLUTION INFILTRATION; PERINEURAL at 15:30

## 2025-08-14 RX ADMIN — SODIUM BICARBONATE 0.5 MEQ: 42 SOLUTION INTRAVENOUS at 15:30

## 2025-08-14 ASSESSMENT — PAIN SCALES - GENERAL
PAINLEVEL_OUTOF10: 6
PAINLEVEL_OUTOF10: 2

## 2025-08-14 ASSESSMENT — PAIN DESCRIPTION - DESCRIPTORS
DESCRIPTORS: ACHING;THROBBING
DESCRIPTORS: ACHING;THROBBING

## 2025-08-14 ASSESSMENT — PAIN - FUNCTIONAL ASSESSMENT
PAIN_FUNCTIONAL_ASSESSMENT: 0-10
PAIN_FUNCTIONAL_ASSESSMENT: 0-10

## 2025-08-14 ASSESSMENT — ACTIVITIES OF DAILY LIVING (ADL): EFFECT OF PAIN ON DAILY ACTIVITIES: SHARP WITH MOVEMENT

## (undated) DEVICE — GLOVE, SURGICAL, PROTEXIS NEOPRENE, 8.0, PF, LF

## (undated) DEVICE — NEEDLE, SAFETY, 25 GA X 1.5 IN

## (undated) DEVICE — BANDAGE, ADHESIVE, FLEXIBLE FABRIC, 1 X 3

## (undated) DEVICE — SYRINGE, 5 CC, LUER LOCK

## (undated) DEVICE — LABEL KIT, MEDICATION, OR EMC, STERILE

## (undated) DEVICE — Device

## (undated) DEVICE — TOWELS 4-PK

## (undated) DEVICE — APPLICATOR, CHLORAPREP, W/ORANGE TINT, 26ML

## (undated) DEVICE — NEEDLE, SAFETY, 18 G X 1.5 IN

## (undated) DEVICE — STRAP, VELCRO, BODY, 4 X 60IN, NS

## (undated) DEVICE — SYRINGE, 10 CC, LUER LOCK

## (undated) DEVICE — DRESSING, GAUZE, SPONGE, 12 PLY, 4 X 4 IN, PLASTIC POUCH, STRL 10PK